# Patient Record
Sex: FEMALE | Race: WHITE | NOT HISPANIC OR LATINO | Employment: OTHER | URBAN - METROPOLITAN AREA
[De-identification: names, ages, dates, MRNs, and addresses within clinical notes are randomized per-mention and may not be internally consistent; named-entity substitution may affect disease eponyms.]

---

## 2017-02-22 ENCOUNTER — TRANSFERRED RECORDS (OUTPATIENT)
Dept: MULTI SPECIALTY CLINIC | Facility: CLINIC | Age: 57
End: 2017-02-22

## 2019-12-28 ENCOUNTER — OFFICE VISIT - HEALTHEAST (OUTPATIENT)
Dept: FAMILY MEDICINE | Facility: CLINIC | Age: 59
End: 2019-12-28

## 2019-12-28 DIAGNOSIS — J03.90 TONSILLITIS: ICD-10-CM

## 2019-12-28 LAB — DEPRECATED S PYO AG THROAT QL EIA: NORMAL

## 2019-12-28 RX ORDER — ALBUTEROL SULFATE 90 UG/1
2 AEROSOL, METERED RESPIRATORY (INHALATION)
Status: SHIPPED | COMMUNITY
Start: 2017-12-17 | End: 2022-10-06

## 2019-12-29 LAB — GROUP A STREP BY PCR: NORMAL

## 2021-05-25 ENCOUNTER — RECORDS - HEALTHEAST (OUTPATIENT)
Dept: ADMINISTRATIVE | Facility: CLINIC | Age: 61
End: 2021-05-25

## 2021-05-28 ENCOUNTER — RECORDS - HEALTHEAST (OUTPATIENT)
Dept: ADMINISTRATIVE | Facility: CLINIC | Age: 61
End: 2021-05-28

## 2021-06-04 VITALS
SYSTOLIC BLOOD PRESSURE: 146 MMHG | HEART RATE: 85 BPM | OXYGEN SATURATION: 96 % | WEIGHT: 293 LBS | RESPIRATION RATE: 16 BRPM | DIASTOLIC BLOOD PRESSURE: 82 MMHG | TEMPERATURE: 97.8 F

## 2021-06-04 NOTE — PATIENT INSTRUCTIONS - HE
Although your rapid strep test is negative, we will still treat you for strep throat based on your symptoms. We will treat with a course of antibiotics. Please complete the full course of antibiotics. You can take your medication with food and with a probiotic such as Culturelle to prevent stomach irritation. You will be contagious for 24 hours following initiation of the medication.    You may use Tylenol or Motrin for pain and fevers.    May drink warm tea, gargle saline solution, or use throat lozenges to sooth throat pain.    Change toothbrush after 48 hours of starting the antibiotic to prevent reinfection.    Watch for resolution of symptoms in the next few days. If you continue to have high fevers, begin to have difficulty swallowing or breathing, notice worsening neck pain, or difficulty moving neck, please return to clinic or present to the emergency room immediately. Otherwise, follow up with your primary care provider as needed.

## 2021-06-05 NOTE — PROGRESS NOTES
Assessment & Plan:       1. Tonsillitis  Rapid Strep A Screen-Throat swab    Group A Strep, RNA Direct Detection, Throat    penicillin VK (PEN VK) 500 MG tablet     Patient Instructions   Although your rapid strep test is negative, we will still treat you for strep throat based on your symptoms. We will treat with a course of antibiotics. Please complete the full course of antibiotics. You can take your medication with food and with a probiotic such as Culturelle to prevent stomach irritation. You will be contagious for 24 hours following initiation of the medication.    You may use Tylenol or Motrin for pain and fevers.    May drink warm tea, gargle saline solution, or use throat lozenges to sooth throat pain.    Change toothbrush after 48 hours of starting the antibiotic to prevent reinfection.    Watch for resolution of symptoms in the next few days. If you continue to have high fevers, begin to have difficulty swallowing or breathing, notice worsening neck pain, or difficulty moving neck, please return to clinic or present to the emergency room immediately. Otherwise, follow up with your primary care provider as needed.          Subjective:       Patricia Mckeon is a 59 y.o. female here for evaluation of throat pain. Onset of symptoms was 3 days ago with no improvement since then. Associated symptoms include painful swallowing and subjective fevers. She denies cough, rhinorrhea, emesis, and diarrhea.     The following portions of the patient's history were reviewed and updated as appropriate: allergies, current medications and problem list.    Review of Systems  Pertinent items are noted in HPI.     Allergies  Allergies   Allergen Reactions     Oxycodone-Acetaminophen Itching       No family history on file.    Social History     Socioeconomic History     Marital status:      Spouse name: None     Number of children: None     Years of education: None     Highest education level: None   Occupational  History     None   Social Needs     Financial resource strain: None     Food insecurity:     Worry: None     Inability: None     Transportation needs:     Medical: None     Non-medical: None   Tobacco Use     Smoking status: Never Smoker     Smokeless tobacco: Never Used   Substance and Sexual Activity     Alcohol use: None     Drug use: None     Sexual activity: None   Lifestyle     Physical activity:     Days per week: None     Minutes per session: None     Stress: None   Relationships     Social connections:     Talks on phone: None     Gets together: None     Attends Sabianism service: None     Active member of club or organization: None     Attends meetings of clubs or organizations: None     Relationship status: None     Intimate partner violence:     Fear of current or ex partner: None     Emotionally abused: None     Physically abused: None     Forced sexual activity: None   Other Topics Concern     None   Social History Narrative     None         Objective:       /82   Pulse 85   Temp 97.8  F (36.6  C) (Oral)   Resp 16   Wt (!) 296 lb 4.8 oz (134.4 kg)   SpO2 96%   Breastfeeding No   General appearance: alert, appears stated age, cooperative, no distress and non-toxic  Head: Normocephalic, without obvious abnormality, atraumatic  Ears: normal TM's and external ear canals both ears  Nose: no discharge  Throat: posterior oropharynx is erythematous, mild tonsil swelling bilaterally, no exudate; MMM, lips and tongue normal  Neck: mild anterior cervical adenopathy and supple, symmetrical, trachea midline  Lungs: clear to auscultation bilaterally  Heart: regular rate and rhythm, S1, S2 normal, no murmur, click, rub or gallop     Lab Results    Recent Results (from the past 672 hour(s))   Rapid Strep A Screen-Throat swab    Collection Time: 12/28/19  2:03 PM   Result Value Ref Range    Rapid Strep A Antigen No Group A Strep detected, presumptive negative No Group A Strep detected, presumptive negative    Group A Strep, RNA Direct Detection, Throat    Collection Time: 12/28/19  2:03 PM   Result Value Ref Range    Group A Strep by PCR No Group A Strep rRNA detected No Group A Strep rRNA detected     I personally reviewed these results and discussed findings with the patient.

## 2022-09-14 ENCOUNTER — TRANSFERRED RECORDS (OUTPATIENT)
Dept: HEALTH INFORMATION MANAGEMENT | Facility: CLINIC | Age: 62
End: 2022-09-14

## 2022-09-22 ENCOUNTER — TRANSFERRED RECORDS (OUTPATIENT)
Dept: HEALTH INFORMATION MANAGEMENT | Facility: CLINIC | Age: 62
End: 2022-09-22

## 2022-10-06 ENCOUNTER — OFFICE VISIT (OUTPATIENT)
Dept: SURGERY | Facility: CLINIC | Age: 62
End: 2022-10-06

## 2022-10-06 ENCOUNTER — TELEPHONE (OUTPATIENT)
Dept: SURGERY | Facility: CLINIC | Age: 62
End: 2022-10-06

## 2022-10-06 ENCOUNTER — LAB (OUTPATIENT)
Dept: LAB | Facility: CLINIC | Age: 62
End: 2022-10-06
Payer: COMMERCIAL

## 2022-10-06 VITALS
BODY MASS INDEX: 44.59 KG/M2 | DIASTOLIC BLOOD PRESSURE: 72 MMHG | SYSTOLIC BLOOD PRESSURE: 116 MMHG | HEIGHT: 64 IN | WEIGHT: 261.2 LBS

## 2022-10-06 DIAGNOSIS — E66.01 MORBID OBESITY (H): ICD-10-CM

## 2022-10-06 DIAGNOSIS — E78.5 DYSLIPIDEMIA: ICD-10-CM

## 2022-10-06 DIAGNOSIS — E66.01 MORBID OBESITY WITH BMI OF 40.0-44.9, ADULT (H): Primary | ICD-10-CM

## 2022-10-06 DIAGNOSIS — M16.11 PRIMARY OSTEOARTHRITIS OF RIGHT HIP: ICD-10-CM

## 2022-10-06 PROBLEM — K21.9 GASTROESOPHAGEAL REFLUX DISEASE WITHOUT ESOPHAGITIS: Status: ACTIVE | Noted: 2022-10-06

## 2022-10-06 LAB
ALBUMIN SERPL BCG-MCNC: 4.7 G/DL (ref 3.5–5.2)
ALP SERPL-CCNC: 171 U/L (ref 35–104)
ALT SERPL W P-5'-P-CCNC: 19 U/L (ref 10–35)
ANION GAP SERPL CALCULATED.3IONS-SCNC: 11 MMOL/L (ref 7–15)
AST SERPL W P-5'-P-CCNC: 25 U/L (ref 10–35)
BILIRUB SERPL-MCNC: 0.6 MG/DL
BUN SERPL-MCNC: 13.6 MG/DL (ref 8–23)
CALCIUM SERPL-MCNC: 9.7 MG/DL (ref 8.8–10.2)
CHLORIDE SERPL-SCNC: 100 MMOL/L (ref 98–107)
CREAT SERPL-MCNC: 0.69 MG/DL (ref 0.51–0.95)
DEPRECATED HCO3 PLAS-SCNC: 27 MMOL/L (ref 22–29)
ERYTHROCYTE [DISTWIDTH] IN BLOOD BY AUTOMATED COUNT: 12.3 % (ref 10–15)
FERRITIN SERPL-MCNC: 234 NG/ML (ref 11–328)
GFR SERPL CREATININE-BSD FRML MDRD: >90 ML/MIN/1.73M2
GLUCOSE SERPL-MCNC: 94 MG/DL (ref 70–99)
HBA1C MFR BLD: 5.1 % (ref 0–5.6)
HCT VFR BLD AUTO: 46.2 % (ref 35–47)
HGB BLD-MCNC: 15.3 G/DL (ref 11.7–15.7)
MCH RBC QN AUTO: 29.5 PG (ref 26.5–33)
MCHC RBC AUTO-ENTMCNC: 33.1 G/DL (ref 31.5–36.5)
MCV RBC AUTO: 89 FL (ref 78–100)
PLATELET # BLD AUTO: 320 10E3/UL (ref 150–450)
POTASSIUM SERPL-SCNC: 3.9 MMOL/L (ref 3.4–5.3)
PROT SERPL-MCNC: 8 G/DL (ref 6.4–8.3)
PTH-INTACT SERPL-MCNC: 42 PG/ML (ref 15–65)
RBC # BLD AUTO: 5.18 10E6/UL (ref 3.8–5.2)
SODIUM SERPL-SCNC: 138 MMOL/L (ref 136–145)
TSH SERPL DL<=0.005 MIU/L-ACNC: 2.16 UIU/ML (ref 0.3–4.2)
VIT B12 SERPL-MCNC: 264 PG/ML (ref 232–1245)
WBC # BLD AUTO: 6.5 10E3/UL (ref 4–11)

## 2022-10-06 PROCEDURE — 84443 ASSAY THYROID STIM HORMONE: CPT

## 2022-10-06 PROCEDURE — 82728 ASSAY OF FERRITIN: CPT

## 2022-10-06 PROCEDURE — 99204 OFFICE O/P NEW MOD 45 MIN: CPT | Performed by: FAMILY MEDICINE

## 2022-10-06 PROCEDURE — 83036 HEMOGLOBIN GLYCOSYLATED A1C: CPT

## 2022-10-06 PROCEDURE — 80053 COMPREHEN METABOLIC PANEL: CPT

## 2022-10-06 PROCEDURE — 36415 COLL VENOUS BLD VENIPUNCTURE: CPT

## 2022-10-06 PROCEDURE — 83970 ASSAY OF PARATHORMONE: CPT

## 2022-10-06 PROCEDURE — 85027 COMPLETE CBC AUTOMATED: CPT

## 2022-10-06 PROCEDURE — 82306 VITAMIN D 25 HYDROXY: CPT

## 2022-10-06 PROCEDURE — 82607 VITAMIN B-12: CPT

## 2022-10-06 RX ORDER — MULTIVIT WITH MINERALS/LUTEIN
1000 TABLET ORAL DAILY
COMMUNITY

## 2022-10-06 RX ORDER — PEN NEEDLE, DIABETIC 30 GX5/16"
1 NEEDLE, DISPOSABLE MISCELLANEOUS DAILY
Qty: 100 EACH | Refills: 3 | Status: SHIPPED | OUTPATIENT
Start: 2022-10-06 | End: 2023-01-06

## 2022-10-06 RX ORDER — PHENOL 1.4 %
600 AEROSOL, SPRAY (ML) MUCOUS MEMBRANE DAILY
Status: ON HOLD | COMMUNITY
End: 2024-08-28

## 2022-10-06 RX ORDER — PHENTERMINE HYDROCHLORIDE 37.5 MG/1
18.75-37.5 TABLET ORAL
Qty: 90 TABLET | Refills: 0 | Status: SHIPPED | OUTPATIENT
Start: 2022-10-06 | End: 2023-01-04

## 2022-10-06 NOTE — PROGRESS NOTES
"    New Medical Weight Management Consult    PATIENT:  Patricia Mckeon  MRN:         [id number]  :         1960  DARINEL:         10/6/2022    Dear [facility] Providers ,    I had the pleasure of seeing your patient, Patricia Mckeon. Full intake/assessment was done to determine barriers to weight loss success and develop a treatment plan. Patricia Mckeon is a 61 year old female interested in treatment of medical problems associated with excess weight. She has a height of 5' 4\", a weight of 261 lbs 3.2 oz, and the calculated Body mass index is 44.83 kg/m .  She was asked to lose to BMI 40 to have her right hip replaced. She has recently lost 60+ pounds with Keto and WW.  She will be traveling to [place] for her son's [employer] skilled nursing in February. It is unlikely that she will lose to her goal, have hip replacement surgery and be ready to travel by this time. Will try to lose as much weight as possible pre-travel then have right hip replaced after returning from [place].      ASSESSMENT/PLAN:  BMI goal 40 for right hip replacememt  Limited Activity d/t OA right kip, bilateral knees  Genetic predisposition  Success with Fen Phen in the past  Interested in GLP-1 RA She does not think her insurance covers them   Truliciity Suggest looking in to PAP and manufacture coupons    Phentermine  Dietitian  Consider 24 week program  GLP-1 RA          She has the following co-morbidities:       10/4/2022   I have the following health issues associated with obesity: Fatty Liver, Osteoarthritis (joint disease)   I have the following symptoms associated with obesity: Knee Pain, Hip Pain       Patient Goals 10/4/2022   I am interested in having a healthier weight to diminish current health problems: Yes   I am interested in having a healthier weight in order to prevent future health problems: Yes   I am interested in having a healthier weight in order to have a future surgery: Yes   If yes, please indicate which surgery? " "Hip and knee replacement       Referring Provider 10/4/2022   Please name the provider who referred you to Medical Weight Management.  If you do not know, please answer: \"I Don't Know\". Hetal Uribe MD       Weight History 10/4/2022   How concerned are you about your weight? Very Concerned   Would you describe your weight gain as gradual? Yes   I became overweight: As a Child   The following factors have contributed to my weight gain:  Eating Wrong Types of Food, Eating Too Much, Lack of Exercise, Genetic (Runs in the Family)   I have tried the following methods to lose weight: Weight Watchers, Atkins-type Diet (Low Carb/High Protein), Charla Fidencio, Slim Fast or Other Liquid Diets, Medications   Please list the medications you have tried.  GOLO   My lowest weight since age 16 was: 175   My highest weight since age 16 was: 335   The most weight I have ever lost was: (lbs) 100   I have the following family history of obesity/being overweight:  Many of my relatives are overweight   Has anyone in your family had weight loss surgery? No   How has your weight changed over the last year?  Lost       Diet Recall Review with Patient 10/4/2022   Do you typically eat breakfast? Yes   If you do eat breakfast, what types of food do you eat? On weekends we may go out and I have a waffle, hash browns, sausage. During the week it is ff cottage cheese and no sugar added fruit and yogurt   Do you typically eat lunch? Yes   If you do eat lunch, what types of food do you typically eat?  A weight watcher meal, healthy choice meal or a salad   Do you typically eat supper? Yes   If you do eat supper, what types of food do you typically eat? Some kind of meat, veggie, salad or ff cottage cheese   Do you typically eat snacks? Yes   If you do snack, what types of food do you typically eat? Try to eat fruit or  maybe a protein bar   Do you like vegetables?  Yes   Do you drink water? Yes   How many glasses of juice do you drink in a " typical day? 0   How many of glasses of milk do you drink in a typical day? 0   If you do drink milk, what type? N/A   How many 8oz glasses of sugar containing drinks such as Isma-Aid/sweet tea do you drink in a day? 0   How many cans/bottles of sugar pop/soda/tea/sports drinks do you drink in a day? 0   How many cans/bottles of diet pop/soda/tea or sports drink do you drink in a day? 2   How often do you have a drink of alcohol? 2-4 Times a Month   If you do drink, how many drinks might you have in a day? 3-4       Eating Habits 10/4/2022   Generally, my meals include foods like these: bread, pasta, rice, potatoes, corn, crackers, sweet dessert, pop, or juice. Less Than Weekly   Generally, my meals include foods like these: fried meats, brats, burgers, french fries, pizza, cheese, chips, or ice cream. Less Than Weekly   Eat fast food (like [employer], [employer], [employer]). Less Than Weekly   Eat at a buffet or sit-down restaurant. Less Than Weekly   Eat most of my meals in front of the TV or computer. Never   Often skip meals, eat at random times, have no regular eating times. Never   Rarely sit down for a meal but snack or graze throughout.  Never   Eat extra snacks between meals. Never   Eat most of my food at the end of the day. A Few Times a Week   Eat in the middle of the night or wake up at night to eat. Never   Eat extra snacks to prevent or correct low blood sugar. Never   Eat to prevent acid reflux or stomach pain. Never   Worry about not having enough food to eat. Never   Have you been to the food shelf at least a few times this year? No   I eat when I am depressed. Never   I eat when I am stressed. A Few Times a Week   I eat when I am bored. A Few Times a Week   I eat when I am anxious. A Few Times a Week   I eat when I am happy or as a reward. Once a Week   I feel hungry all the time even if I just have eaten. Never   Feeling full is important to me. Almost Everyday   I finish all the food on my plate  even if I am already full. Almost Everyday   I can't resist eating delicious food or walk past the good food/smell. Less Than Weekly   I eat/snack without noticing that I am eating. Almost Everyday   I eat when I am preparing the meal. A Few Times a Week   I eat more than usual when I see others eating. Never   I have trouble not eating sweets, ice cream, cookies, or chips if they are around the house. Everyday   I think about food all day. Everyday   What foods, if any, do you crave? Sweets/Candy/Chocolate   Please list any other foods you crave? Just sweets       Amount of Food 10/4/2022   I make myself vomit what I have eaten or use laxatives to get rid of food. Never   I eat a large amount of food, like a loaf of bread, a box of cookies, a pint/quart of ice cream, all at once. Never   I eat a large amount of food even when I am not hungry. Never   I eat rapidly. Everyday   I eat alone because I feel embarrassed and do not want others to see how much I have eaten. Never   I eat until I am uncomfortably full. Monthly   I feel bad, disgusted, or guilty after I overeat. Almost Everyday   I make myself vomit what I have eaten or use laxatives to get rid of food. Never       Activity/Exercise History 10/4/2022   How much of a typical 12 hour day do you spend sitting? Less Than Half the Day   How much of a typical 12 hour day do you spend lying down? Less Than Half the Day   How much of a typical day do you spend walking/standing? Less Than Half the Day   How many hours (not including work) do you spend on the TV/Video Games/Computer/Tablet/Phone? 6 Hours or More   How many times a week are you active for the purpose of exercise? Never   What keeps you from being more active? Pain   How many total minutes do you spend doing some activity for the purpose of exercising when you exercise? Less Than 15 Minutes       PAST MEDICAL HISTORY:  Past Medical History:   Diagnosis Date     Dyslipidemia      Gastroesophageal reflux  disease without esophagitis      Osteoarthritis     knees and hips needing replacement       Work/Social History Reviewed With Patient 10/4/2022   My employment status is: Full-Time   My job is:  provider   How much of your job is spent on the computer or phone? Less Than 50%   How many hours do you spend commuting to work daily?  0   What is your marital status? /In a Relationship   If in a relationship, is your significant other overweight? No   Do you have children? Yes   If you have children, are they overweight? Yes   Who do you live with?  My    Are they supportive of your health goals? Yes   Who does the food shopping?  Me       Mental Health History Reviewed With Patient 10/4/2022   Have you ever been physically or sexually abused? No   If yes, do you feel that the abuse is affecting your weight? N/A   If yes, would you like to talk to a counselor about the abuse? N/A   How often in the past 2 weeks have you felt little interest or pleasure in doing things? Not at all   Over the past 2 weeks how often have you felt down, depressed, or hopeless? Not at all       Sleep History Reviewed With Patient 10/4/2022   How many hours do you sleep at night? 7   Do you think that you snore loudly or has anybody ever heard you snore loudly (louder than talking or so loud it can be heard behind a shut door)? Yes   Has anyone seen or heard you stop breathing during your sleep? No   Do you often feel tired, fatigued, or sleepy during the day? No   Do you have a TV/Computer in your bedroom? Yes       MEDICATIONS:   Current Outpatient Medications   Medication Sig Dispense Refill     Aspirin 81 MG CAPS        calcium carbonate (OS-RAJANI) 600 MG tablet Take by mouth 2 times daily (with meals)       cane Liz [CANE LIZ] Wide Base Quad Cane for home use. For 12 weeks.       cholecalciferol (VITAMIN D3) 125 mcg (5000 units) capsule Take by mouth daily       dulaglutide (TRULICITY) 0.75 MG/0.5ML pen Inject 0.75  "mg Subcutaneous every 7 days for 28 days 2 mL 0     dulaglutide (TRULICITY) 1.5 MG/0.5ML pen Inject 1.5 mg Subcutaneous every 7 days 6 mL 3     Garlic 1000 MG CAPS        Omega-3 Fatty Acids (FISH OIL) 1360 MG CAPS        omeprazole (PRILOSEC) 20 MG capsule [OMEPRAZOLE (PRILOSEC) 20 MG CAPSULE] Take 20 mg by mouth.       phentermine (ADIPEX-P) 37.5 MG tablet Take 0.5-1 tablets (18.75-37.5 mg) by mouth every morning (before breakfast) for 90 days 90 tablet 0     UNABLE TO FIND MEDICATION NAME: Move Free Triple Action - joints       vitamin C (ASCORBIC ACID) 1000 MG TABS Take 1,000 mg by mouth daily         ALLERGIES:   Allergies   Allergen Reactions     Oxycodone-Acetaminophen Itching       PHYSICAL EXAM:  /72   Ht 1.626 m (5' 4\")   Wt 118.5 kg (261 lb 3.2 oz)   BMI 44.83 kg/m    Cane assisted ambulation  Pleasant and in no distress  Mallampati 1+ Neck 16\"  Heart regular  Lungs clear  Abdomen large pannus, 53\" circumference  Extremities tr BLE  Alert and oriented  Euthymic    FOLLOW-UP:   As above.    TIME: 45 min spent on evaluation, management, counseling, education, & motivational interviewing and documentation  Sincerely,    Hetal Uribe MD      "

## 2022-10-06 NOTE — PATIENT INSTRUCTIONS
[facility] Basics    Remember to: check out the 24 week program  [email or web address]/weight-loss-24-week     -Eat 3 meals a day (not 2, not 5) Chew your food well/SLOW down  -Eat your protein first  -Be a water drinker/Minize liquid calories (no regular pop, no juice) skim or 1% milk OK  -Sleep 7-8 hours each night. Address sleep if problematic  -Stress management is important. Address if problematic  -Move-8000 steps daily Muscle: maintain your muscle mass (strength training 2X/wk)  -Wheat, not white (bread, pasta, crackers, rickie, bagels, tortillas, rice)  -Limit restaurant, cafeteria, take out, drive through to 2 times per week or less  -Minimize caffeine, alcohol, and night-time snacking  -Consider keeping a food diary (i.e. My Fitness Pal, Lose It, or other food tracker)  -Follow up with the dietitian      **Some lean proteins: chicken, turkey, tuna, salmon, crab, fish, shrimp, scallops, lobster, lean cuts of beef and pork, luncheon meats, veggie burgers, beans (black, lima, garbanzo, griggs, kidney, refried), chile, cottage cheese, string cheese, other cheese, eggs, tofu, peanut butter, nuts, vegan crumbles, greek yogurt       MEDICATIONS FOR WEIGHT LOSS    PHENTERMINE (Adipex): approved in 1959 for appetite suppression.  It has stimulant effects and cannot be used with Ritalin, Concerta, or other stimulants.  It is not addictive although it's chemically related to amphetamines.  Amphetamines are addictive. The most common side effects are dry mouth, increased energy and concentration, increased pulse, and constipation.  You should not take phentermine if you have glaucoma, hyperthyroidism, or uncontrolled/untreated hypertension.  $24-$30 for 90 tablets    PHENDIMETRAZINE (Bontril): Appetite suppressant/sympathomimetic.  Controlled substance.  Side effects and contraindications similar to phentermine.  $45-$60 for 3 month supply    TOPIRAMATE (Topamax): Anti-seizure medication, also used  to prevent migraines.  Side effects include paresthesia, glaucoma, altered concentration, attention difficulties, memory and speech problems, metabolic acidosis, depression, increase in body temperature and decrease sweating, kidney stones, and weight loss.  Do not take Topamax while taking Depakote as this can cause high ammonia levels.  You must have reliable birth control as Topamax can cause birth defects.  Discontinue slowly to avoid seizure.  Insurance usually covers Topiramate.    QSYMIA (Phentermine + Topamax):  See above information about phentermine and Topamax.  Most common side effects are paresthesia, dizziness, distortion of taste, insomnia, constipation, and dry mouth.  $150-$220 per month    DIETHYLPROPION (Tenuate): Sympathomimetic amine.  Appetite suppressant.  Doses 25 mg before meals or 75 mg per day.  Most common side effects are hypertension, palpitations, EKG changes, and increased seizures in epileptics.  There can be a possible adverse reaction with alcohol.  $70-$90 per 3 months    XENICAL(Orlistat) (Silvio OTC): Approved in 1999.  A fat-blocker.  It reduces absorption of fat by approximately 30%.  It has beneficial effects on lipid levels.  Side effects include diarrhea, abdominal cramping, fecal incontinence, oily spotting, and flatus with discharge.  Side effects are minimized if the patient limits their dietary fat to no more than 30% of their diet.  Patients must take a multivitamin daily to avoid vitamin D, E, A, and K deficiency.  $120 per month    CONTRAVE (Naltrexone/Bupropion): Approved in 2014.  It is a combination pill including an opioid receptor blocker and a long-standing antidepressant.  Most common side effects include nausea, constipation, headache, vomiting, dizziness, trouble sleeping, dry mouth, and diarrhea.  With all antidepressants watch for mood changes and suicide ideation.  Bupropion has been known to lower the seizure threshold in those prone to seizures.  It  should not be used in a patient with a recent history of bulimia. It has been associated with liver damage from taking higher than recommended doses.  Do not use countrave if you have taken opioid medications or opioid street drugs in the past 7-10 days, if you are currently on opioids, methadone, or if you are pregnant.  Do not use contrave if you have recently stopped using alcohol or benzodiazepines.  Taper off contrave slowly.  Dosing: titrate up to 2 tablets twice daily of the Naltrexone 8 mg/ Bupropion 90 mg tablets.  $200 for 90 tablets    SAXENDA (Liraglutide): A daily injectable (3mg daily) medication used for type 2 diabetes (Victoza). Glucagon-like peptide-1 (GLP-1) agonist. Contraindications include personal or family history of medullary thyroid cancer or MEN type 2. Acute pancreatitis has been observed in patients taking liraglutide. Liraglutide causes C-cell tumors in rats and mice. It is unknown whether liraglutide causes tumors in humans. Start at 0.6mg, increasing the dose weekly up to 3mg.     TRULICITY (Dulaglutide): A weekly injectable (4.5mg weekly) medication used for type 2 diabetes. Glucagon-like peptide-1(GLP-1) agonist. Contraindications include personal or family history of medullary thyroid cancer or MEN type 2. Acute pancreatitis has been observed in patients taking liraglutide. Dulaglutide causes C-cell tumors in rats and mice. It is unknown whether liraglutide causes tumors in humans. Start at 0.75 mg, 1.5 mg, 3.0 mg, to 4.5 mg increasing the dose monthly.      VYVANSE (Lisdexamfetamine dimesylate): a CNS stimulant used to treat ADHD. Indicated for the treatment of moderate to severe Binge Eating Disorder in Adults. Contraindicated in patients with known heart disease, structural abnormalities of the heart, serious heart arrhythmias or unexplained syncope. CNS stimulants such as vyvanse may cause manic or psychotic symptoms in patients with BPAD or pre-existing psychosis. Use with  caution in patients with Raynaud's phenomenon. Most common side effects include dry mouth, insomnia, decreased appetite, increased heart rate, jittery feeling, constipation and anxiety.     WEGOVY (Semaglutide): A weekly injectable (2.4mg weekly) medication used for type 2 diabetes (Ozempic). Glucagon-like peptide-1 (GLP-1) agonist. Contraindications include personal or family history of medullary thyroid cancer or MEN type 2. Acute pancreatitis has been observed in patients taking Semaglutide. Semaglutide causes C-cell tumors in rats and mice. It is unknown whether Semaglutide causes tumors in humans. Start at 0.25mg, increasing to 0.5, 1.0, 1.7 then 2.4 monthly.     MOUNJARO (Tirzepatide): A weekly injectable medication indicated for type 2 diabetes. Glucagon-like-peptide-1 (GLP-1) agonist and Glucose-Dependent Insulinotropic Polypeptide (GIP) agonist. Contraindications include personal or family history of medullary thyroid cancer or MEN type 2. Acute pancreatitis has been observed in patients taking Tirzepatide. Tirzepatide causes C-cell tumors in rats and mice. It is unknown whether Tirzepatide causes tumors in humans. Watch for neck mass, difficulty swallowing, persistent hoarseness, and epigastric abdominal pain. Most common side effects include nausea, diarrhea, vomiting, constipation, dyspepsia, and abdominal pain. Start at 2.5mg, increasing to 5.0, 7.5, 10, 12.5 then 15mg monthly.        [email or web address]     Semaglutide (Wegovy) (Ozempic)-SHORTAGE  Liraglutide (Saxenda) (Victoza)--DAILY  Exenatide (Bydureon)  Dulaglutide (Trulicity)    Tirzepatide (Mounjaro)

## 2022-10-06 NOTE — LETTER
"    10/6/2022         RE: Patricia Mckeon  [address] [id number]        Dear Colleague,    Thank you for referring your patient, Patricia Mckeon, to the [facility]ATRICS [facility] [place]. Please see a copy of my visit note below.        New Medical Weight Management Consult    PATIENT:  Patricia Mckeon  MRN:         [id number]  :         1960  DARINEL:         10/6/2022    Dear [facility] Providers ,    I had the pleasure of seeing your patient, Patricia Mckeon. Full intake/assessment was done to determine barriers to weight loss success and develop a treatment plan. Patricia Mckeon is a 61 year old female interested in treatment of medical problems associated with excess weight. She has a height of 5' 4\", a weight of 261 lbs 3.2 oz, and the calculated Body mass index is 44.83 kg/m .  She was asked to lose to BMI 40 to have her right hip replaced. She has recently lost 60+ pounds with Keto and WW.  She will be traveling to [place] for her son's [employer] nursing [facility] in February. It is unlikely that she will lose to her goal, have hip replacement surgery and be ready to travel by this time. Will try to lose as much weight as possible pre-travel then have right hip replaced after returning from [place].      ASSESSMENT/PLAN:  BMI goal 40 for right hip replacememt  Limited Activity d/t OA right kip, bilateral knees  Genetic predisposition  Success with Fen Phen in the past  Interested in GLP-1 RA She does not think her insurance covers them   Tremilyiiashvin Suggest looking in to PAP and manufacture coupons    Phentermine  Dietitian  Consider 24 week program  GLP-1 RA          She has the following co-morbidities:       10/4/2022   I have the following health issues associated with obesity: Fatty Liver, Osteoarthritis (joint disease)   I have the following symptoms associated with obesity: Knee Pain, Hip Pain       Patient Goals 10/4/2022   I am interested in " "having a healthier weight to diminish current health problems: Yes   I am interested in having a healthier weight in order to prevent future health problems: Yes   I am interested in having a healthier weight in order to have a future surgery: Yes   If yes, please indicate which surgery? Hip and knee replacement       Referring Provider 10/4/2022   Please name the provider who referred you to Medical Weight Management.  If you do not know, please answer: \"I Don't Know\". Hetal Uribe MD       Weight History 10/4/2022   How concerned are you about your weight? Very Concerned   Would you describe your weight gain as gradual? Yes   I became overweight: As a Child   The following factors have contributed to my weight gain:  Eating Wrong Types of Food, Eating Too Much, Lack of Exercise, Genetic (Runs in the Family)   I have tried the following methods to lose weight: Weight Watchers, Atkins-type Diet (Low Carb/High Protein), Charla Fidencio, Slim Fast or Other Liquid Diets, Medications   Please list the medications you have tried.  GOLO   My lowest weight since age 16 was: 175   My highest weight since age 16 was: 335   The most weight I have ever lost was: (lbs) 100   I have the following family history of obesity/being overweight:  Many of my relatives are overweight   Has anyone in your family had weight loss surgery? No   How has your weight changed over the last year?  Lost       Diet Recall Review with Patient 10/4/2022   Do you typically eat breakfast? Yes   If you do eat breakfast, what types of food do you eat? On weekends we may go out and I have a waffle, hash browns, sausage. During the week it is ff cottage cheese and no sugar added fruit and yogurt   Do you typically eat lunch? Yes   If you do eat lunch, what types of food do you typically eat?  A weight watcher meal, healthy choice meal or a salad   Do you typically eat supper? Yes   If you do eat supper, what types of food do you typically eat? " Some kind of meat, veggie, salad or ff cottage cheese   Do you typically eat snacks? Yes   If you do snack, what types of food do you typically eat? Try to eat fruit or  maybe a protein bar   Do you like vegetables?  Yes   Do you drink water? Yes   How many glasses of juice do you drink in a typical day? 0   How many of glasses of milk do you drink in a typical day? 0   If you do drink milk, what type? N/A   How many 8oz glasses of sugar containing drinks such as Isma-Aid/sweet tea do you drink in a day? 0   How many cans/bottles of sugar pop/soda/tea/sports drinks do you drink in a day? 0   How many cans/bottles of diet pop/soda/tea or sports drink do you drink in a day? 2   How often do you have a drink of alcohol? 2-4 Times a Month   If you do drink, how many drinks might you have in a day? 3-4       Eating Habits 10/4/2022   Generally, my meals include foods like these: bread, pasta, rice, potatoes, corn, crackers, sweet dessert, pop, or juice. Less Than Weekly   Generally, my meals include foods like these: fried meats, brats, burgers, french fries, pizza, cheese, chips, or ice cream. Less Than Weekly   Eat fast food (like [employer], [employer], [employer]aco Bell). Less Than Weekly   Eat at a buffet or sit-down restaurant. Less Than Weekly   Eat most of my meals in front of the TV or computer. Never   Often skip meals, eat at random times, have no regular eating times. Never   Rarely sit down for a meal but snack or graze throughout.  Never   Eat extra snacks between meals. Never   Eat most of my food at the end of the day. A Few Times a Week   Eat in the middle of the night or wake up at night to eat. Never   Eat extra snacks to prevent or correct low blood sugar. Never   Eat to prevent acid reflux or stomach pain. Never   Worry about not having enough food to eat. Never   Have you been to the food shelf at least a few times this year? No   I eat when I am depressed. Never   I eat when I am stressed. A Few Times a  Week   I eat when I am bored. A Few Times a Week   I eat when I am anxious. A Few Times a Week   I eat when I am happy or as a reward. Once a Week   I feel hungry all the time even if I just have eaten. Never   Feeling full is important to me. Almost Everyday   I finish all the food on my plate even if I am already full. Almost Everyday   I can't resist eating delicious food or walk past the good food/smell. Less Than Weekly   I eat/snack without noticing that I am eating. Almost Everyday   I eat when I am preparing the meal. A Few Times a Week   I eat more than usual when I see others eating. Never   I have trouble not eating sweets, ice cream, cookies, or chips if they are around the house. Everyday   I think about food all day. Everyday   What foods, if any, do you crave? Sweets/Candy/Chocolate   Please list any other foods you crave? Just sweets       Amount of Food 10/4/2022   I make myself vomit what I have eaten or use laxatives to get rid of food. Never   I eat a large amount of food, like a loaf of bread, a box of cookies, a pint/quart of ice cream, all at once. Never   I eat a large amount of food even when I am not hungry. Never   I eat rapidly. Everyday   I eat alone because I feel embarrassed and do not want others to see how much I have eaten. Never   I eat until I am uncomfortably full. Monthly   I feel bad, disgusted, or guilty after I overeat. Almost Everyday   I make myself vomit what I have eaten or use laxatives to get rid of food. Never       Activity/Exercise History 10/4/2022   How much of a typical 12 hour day do you spend sitting? Less Than Half the Day   How much of a typical 12 hour day do you spend lying down? Less Than Half the Day   How much of a typical day do you spend walking/standing? Less Than Half the Day   How many hours (not including work) do you spend on the TV/Video Games/Computer/Tablet/Phone? 6 Hours or More   How many times a week are you active for the purpose of  exercise? Never   What keeps you from being more active? Pain   How many total minutes do you spend doing some activity for the purpose of exercising when you exercise? Less Than 15 Minutes       PAST MEDICAL HISTORY:  Past Medical History:   Diagnosis Date     Dyslipidemia      Gastroesophageal reflux disease without esophagitis      Osteoarthritis     knees and hips needing replacement       Work/Social History Reviewed With Patient 10/4/2022   My employment status is: Full-Time   My job is:  provider   How much of your job is spent on the computer or phone? Less Than 50%   How many hours do you spend commuting to work daily?  0   What is your marital status? /In a Relationship   If in a relationship, is your significant other overweight? No   Do you have children? Yes   If you have children, are they overweight? Yes   Who do you live with?  My    Are they supportive of your health goals? Yes   Who does the food shopping?  Me       Mental Health History Reviewed With Patient 10/4/2022   Have you ever been physically or sexually abused? No   If yes, do you feel that the abuse is affecting your weight? N/A   If yes, would you like to talk to a counselor about the abuse? N/A   How often in the past 2 weeks have you felt little interest or pleasure in doing things? Not at all   Over the past 2 weeks how often have you felt down, depressed, or hopeless? Not at all       Sleep History Reviewed With Patient 10/4/2022   How many hours do you sleep at night? 7   Do you think that you snore loudly or has anybody ever heard you snore loudly (louder than talking or so loud it can be heard behind a shut door)? Yes   Has anyone seen or heard you stop breathing during your sleep? No   Do you often feel tired, fatigued, or sleepy during the day? No   Do you have a TV/Computer in your bedroom? Yes       MEDICATIONS:   Current Outpatient Medications   Medication Sig Dispense Refill     Aspirin 81 MG CAPS         "calcium carbonate (OS-RAJANI) 600 MG tablet Take by mouth 2 times daily (with meals)       cane Liz [CANE LIZ] Wide Base Quad Cane for home use. For 12 weeks.       cholecalciferol (VITAMIN D3) 125 mcg (5000 units) capsule Take by mouth daily       dulaglutide (TRULICITY) 0.75 MG/0.5ML pen Inject 0.75 mg Subcutaneous every 7 days for 28 days 2 mL 0     dulaglutide (TRULICITY) 1.5 MG/0.5ML pen Inject 1.5 mg Subcutaneous every 7 days 6 mL 3     Garlic 1000 MG CAPS        Omega-3 Fatty Acids (FISH OIL) 1360 MG CAPS        omeprazole (PRILOSEC) 20 MG capsule [OMEPRAZOLE (PRILOSEC) 20 MG CAPSULE] Take 20 mg by mouth.       phentermine (ADIPEX-P) 37.5 MG tablet Take 0.5-1 tablets (18.75-37.5 mg) by mouth every morning (before breakfast) for 90 days 90 tablet 0     UNABLE TO FIND MEDICATION NAME: Move Free Triple Action - joints       vitamin C (ASCORBIC ACID) 1000 MG TABS Take 1,000 mg by mouth daily         ALLERGIES:   Allergies   Allergen Reactions     Oxycodone-Acetaminophen Itching       PHYSICAL EXAM:  /72   Ht 1.626 m (5' 4\")   Wt 118.5 kg (261 lb 3.2 oz)   BMI 44.83 kg/m    Cane assisted ambulation  Pleasant and in no distress  Mallampati 1+ Neck 16\"  Heart regular  Lungs clear  Abdomen large pannus, 53\" circumference  Extremities tr BLE  Alert and oriented  Euthymic    FOLLOW-UP:   As above.    TIME: 45 min spent on evaluation, management, counseling, education, & motivational interviewing and documentation  Sincerely,    Hetal Uribe MD          Again, thank you for allowing me to participate in the care of your patient.        Sincerely,        Hetal Uribe MD    "

## 2022-10-07 ENCOUNTER — TELEPHONE (OUTPATIENT)
Dept: SURGERY | Facility: CLINIC | Age: 62
End: 2022-10-07

## 2022-10-07 LAB — DEPRECATED CALCIDIOL+CALCIFEROL SERPL-MC: 48 UG/L (ref 20–75)

## 2022-10-13 DIAGNOSIS — E53.8 LOW SERUM VITAMIN B12: Primary | ICD-10-CM

## 2022-10-14 ENCOUNTER — TELEPHONE (OUTPATIENT)
Dept: SURGERY | Facility: CLINIC | Age: 62
End: 2022-10-14

## 2022-10-14 NOTE — TELEPHONE ENCOUNTER
Prior Authorization Retail Medication Request    Medication/Dose: Saxenda 18mg/3ml Inj    Key: [id number]    Pharmacy Information (if different than what is on RX)  Name:  [facility]  Phone:  [phone]

## 2022-10-14 NOTE — TELEPHONE ENCOUNTER
Central Prior Authorization Team   Phone: [phone]    PA Initiation    Medication: Saxenda 18mg/3ml Inj  Insurance Company: [employer] - Phone [phone] Fax [phone]  Pharmacy Filling the Rx: [id number] [[place]][address]  Filling Pharmacy Phone: [phone]  Filling Pharmacy Fax:    Start Date: 10/14/2022  Manually faxed request

## 2022-11-04 ENCOUNTER — VIRTUAL VISIT (OUTPATIENT)
Dept: SURGERY | Facility: CLINIC | Age: 62
End: 2022-11-04
Payer: COMMERCIAL

## 2022-11-04 DIAGNOSIS — E66.01 CLASS 3 SEVERE OBESITY DUE TO EXCESS CALORIES WITH SERIOUS COMORBIDITY AND BODY MASS INDEX (BMI) OF 40.0 TO 44.9 IN ADULT (H): ICD-10-CM

## 2022-11-04 DIAGNOSIS — Z71.3 NUTRITIONAL COUNSELING: ICD-10-CM

## 2022-11-04 DIAGNOSIS — E66.813 CLASS 3 SEVERE OBESITY DUE TO EXCESS CALORIES WITH SERIOUS COMORBIDITY AND BODY MASS INDEX (BMI) OF 40.0 TO 44.9 IN ADULT (H): ICD-10-CM

## 2022-11-04 DIAGNOSIS — E78.5 DYSLIPIDEMIA: Primary | ICD-10-CM

## 2022-11-04 PROCEDURE — 97802 MEDICAL NUTRITION INDIV IN: CPT | Mod: 95 | Performed by: DIETITIAN, REGISTERED

## 2022-11-04 NOTE — PROGRESS NOTES
Patricia Mckeon is a 62 year old who is being evaluated via a billable video visit.      How would you like to obtain your AVS? MyChart  If the video visit is dropped, the invitation should be resent by: Send to e-mail at: [email or web address]  Will anyone else be joining your video visit? No          Medical Weight Loss Initial Diet Evaluation  Assessment:  Patricia is presenting today for a new weight management nutrition consultation. Pt has had an initial appointment with Dr. Uribe.  Weight loss medication: Phentermine.       Anthropometrics:    Pt's weight is 256 lbs   Initial weight: 261.2 lbs  Weight change: 5.2 lbs (down)   BMI: There is no height or weight on file to calculate BMI.   Ideal body weight: 54.7 kg (120 lb 9.5 oz)  Adjusted ideal body weight: 80.2 kg (176 lb 13.4 oz)    Estimated RMR ([place]-St Jeor equation):  1701 kcals x 1.3 (light active) = 2211 kcals (for weight maintenance)    Recommended Protein Intake: 60-80 grams of protein/day    Medical History:  Patient Active Problem List   Diagnosis     Gastroesophageal reflux disease without esophagitis     Dyslipidemia     Morbid obesity (H)      Diabetes: No  HbA1c:  No results found for: HGBA1C    Nutrition History:   Food allergies/intolerances/cultural or religous food customs: No     Vitamins/Mineral Supplementation: Vitamin D3, Omega 3, Calcium, Vitamin C, Garlic, Vitamin B12    Dietary Recall:(using bigtincan corbin, 24 points/day)  Breakfast: cottage cheese, berries and peaches, fit and light yogurt   Lunch: 45 calorie bread, turkey, guacamole with fruit and salad   Dinner: taco salad with lean ground meat or ground turkey  Typical Snacks: cauliflower strips or WW chips     Eating out: 1 time/week or less    Beverages: water-average around 64 oz + (trying to be more mindful of, starting this week), Sparkling Water, coffee     Exercise: no set regimen-has a , so stays active with the kids    Nutrition Diagnosis (PES statement):    Overweight/Obesity (NC 3.3) related to overeating and poor lifestyle habits as evidenced by patient report of inability to lose weight and BMI of 44 kg/m2.     Nutrition Intervention  1. Food and/or Nutrient Delivery   a. Placed emphasis on importance of developing a healthy meal routine, aiming for 3 meals a day and no snacks.    2. Nutrition Education   a. Discussed with patient how to build a meal: the importance of including a lean/low fat protein at each meal, include a source of vegetables at a minimum of lunch and dinner and limiting carbohydrate intake to <25% per meal.  b. Educated on sources of lean protein, portion sizes, the amount of grams found in each source. Recommend patient to aim for 20-30g protein at each meal.  c. Educated on how to read a food label: keeping total fat <10g and sugar <10g per serving.  d. Discussed the importance of adequate hydration, with emphasis on drinking 64oz of water or zero calorie beverages per day.  3. Nutrition Counseling   a. Encouraged importance of developing routine exercise for health benefits and weight loss.  Goals established by patient:   1. Continues to focus on protein first at each meal, aiming for 60-80 grams of protein/day.   2. Continue to use WW system to help with portion control.   3. Work on establishing an exercise routine as tolerated.   Handouts provided:  Exercise Video Options    Assessment/Plan:    Pt will follow up in 2 month(s) with bariatrician and 1 month(s) with dietitian.       Video-Visit Details    Type of service:  Video Visit    Video Start Time (time video started): 9:14 am    Video End Time (time video stopped): 9:29 am    Originating Location (pt. Location): Home        Distant Location (provider location):  Off-site    Mode of Communication:  Video Conference via [facility]    Physician has received verbal consent for a Video Visit from the patient? Yes      Pati Amador, JUSTIN

## 2022-11-04 NOTE — LETTER
11/4/2022         RE: Patricia Mckeon  [address] [address]        Dear Colleague,    Thank you for referring your patient, Patricia Mckeon, to the [facility] BARIATRICS CARE [place]. Please see a copy of my visit note below.    Patricia Mckeon is a 62 year old who is being evaluated via a billable video visit.      How would you like to obtain your AVS? MyChart  If the video visit is dropped, the invitation should be resent by: Send to e-mail at: [email or web address]  Will anyone else be joining your video visit? No          Medical Weight Loss Initial Diet Evaluation  Assessment:  Patricia is presenting today for a new weight management nutrition consultation. Pt has had an initial appointment with Dr. Uribe.  Weight loss medication: Phentermine.       Anthropometrics:    Pt's weight is 256 lbs   Initial weight: 261.2 lbs  Weight change: 5.2 lbs (down)   BMI: There is no height or weight on file to calculate BMI.   Ideal body weight: 54.7 kg (120 lb 9.5 oz)  Adjusted ideal body weight: 80.2 kg (176 lb 13.4 oz)    Estimated RMR ([place]-St Jeor equation):  1701 kcals x 1.3 (light active) = 2211 kcals (for weight maintenance)    Recommended Protein Intake: 60-80 grams of protein/day    Medical History:  Patient Active Problem List   Diagnosis     Gastroesophageal reflux disease without esophagitis     Dyslipidemia     Morbid obesity (H)      Diabetes: No  HbA1c:  No results found for: HGBA1C    Nutrition History:   Food allergies/intolerances/cultural or religous food customs: No     Vitamins/Mineral Supplementation: Vitamin D3, Omega 3, Calcium, Vitamin C, Garlic, Vitamin B12    Dietary Recall:(using Zmanda corbin, 24 points/day)  Breakfast: cottage cheese, berries and peaches, fit and light yogurt   Lunch: 45 calorie bread, turkey, guacamole with fruit and salad   Dinner: taco salad with lean ground meat or ground turkey  Typical Snacks: cauliflower strips or WW chips      Eating out: 1 time/week or less    Beverages: water-average around 64 oz + (trying to be more mindful of, starting this week), Sparkling Water, coffee     Exercise: no set regimen-has a , so stays active with the kids    Nutrition Diagnosis (PES statement):   Overweight/Obesity (NC 3.3) related to overeating and poor lifestyle habits as evidenced by patient report of inability to lose weight and BMI of 44 kg/m2.     Nutrition Intervention  1. Food and/or Nutrient Delivery   a. Placed emphasis on importance of developing a healthy meal routine, aiming for 3 meals a day and no snacks.    2. Nutrition Education   a. Discussed with patient how to build a meal: the importance of including a lean/low fat protein at each meal, include a source of vegetables at a minimum of lunch and dinner and limiting carbohydrate intake to <25% per meal.  b. Educated on sources of lean protein, portion sizes, the amount of grams found in each source. Recommend patient to aim for 20-30g protein at each meal.  c. Educated on how to read a food label: keeping total fat <10g and sugar <10g per serving.  d. Discussed the importance of adequate hydration, with emphasis on drinking 64oz of water or zero calorie beverages per day.  3. Nutrition Counseling   a. Encouraged importance of developing routine exercise for health benefits and weight loss.  Goals established by patient:   1. Continues to focus on protein first at each meal, aiming for 60-80 grams of protein/day.   2. Continue to use WW system to help with portion control.   3. Work on establishing an exercise routine as tolerated.   Handouts provided:  Exercise Video Options    Assessment/Plan:    Pt will follow up in 2 month(s) with bariatrician and 1 month(s) with dietitian.       Video-Visit Details    Type of service:  Video Visit    Video Start Time (time video started): 9:14 am    Video End Time (time video stopped): 9:29 am    Originating Location (pt. Location):  Home        Distant Location (provider location):  Off-site    Mode of Communication:  Video Conference via [facility]    Physician has received verbal consent for a Video Visit from the patient? Yes      Pati Amador RD          Again, thank you for allowing me to participate in the care of your patient.        Sincerely,        Pati Amador RD

## 2022-12-09 ENCOUNTER — VIRTUAL VISIT (OUTPATIENT)
Dept: SURGERY | Facility: CLINIC | Age: 62
End: 2022-12-09
Payer: COMMERCIAL

## 2022-12-09 DIAGNOSIS — E66.813 CLASS 3 SEVERE OBESITY DUE TO EXCESS CALORIES WITH SERIOUS COMORBIDITY AND BODY MASS INDEX (BMI) OF 40.0 TO 44.9 IN ADULT (H): Primary | ICD-10-CM

## 2022-12-09 DIAGNOSIS — Z71.3 NUTRITIONAL COUNSELING: ICD-10-CM

## 2022-12-09 DIAGNOSIS — E78.5 DYSLIPIDEMIA: ICD-10-CM

## 2022-12-09 DIAGNOSIS — E66.01 CLASS 3 SEVERE OBESITY DUE TO EXCESS CALORIES WITH SERIOUS COMORBIDITY AND BODY MASS INDEX (BMI) OF 40.0 TO 44.9 IN ADULT (H): Primary | ICD-10-CM

## 2022-12-09 PROCEDURE — 97803 MED NUTRITION INDIV SUBSEQ: CPT | Mod: 95 | Performed by: DIETITIAN, REGISTERED

## 2022-12-09 NOTE — PROGRESS NOTES
Patricia Mckeon is a 62 year old who is being evaluated via a billable video visit.      How would you like to obtain your AVS? MyChart  If the video visit is dropped, the invitation should be resent by: Send to e-mail at: [email or web address]  Will anyone else be joining your video visit? No        Medical  Weight Loss Follow-Up Diet Evaluation  Assessment:  Patricia is presenting today for a follow up weight management nutrition consultation. Pt has had an initial appointment with Dr. Uribe.  Weight loss medication: Phentermine.   Pt's weight is 250 lbs   Initial weight: 261.2 lbs  Weight change: 11.2 lbs (down)    No flowsheet data found.  BMI: There is no height or weight on file to calculate BMI.  Ideal body weight: 54.7 kg (120 lb 9.5 oz)  Adjusted ideal body weight: 80.2 kg (176 lb 13.4 oz)    Estimated RMR ([place]-St Jeor equation):   1674 kcals x 1.3 (light active) = 2176 kcals (for weight maintenance)     Recommended Protein Intake: 60-80 grams of protein/day  Patient Active Problem List:  Patient Active Problem List   Diagnosis     Gastroesophageal reflux disease without esophagitis     Dyslipidemia     Morbid obesity (H)     Diabetes: No    Progress on goals from last visit: Has been working on protein first at meals, noting that it is challenging during the holidays, but is trying to work her way through it.  Continues to utilize the eBIZ.mobility point system for portion control.  Patient reports that she feels that things are going well at this point and does not have any specific questions at this time.     Dietary Recall:  Breakfast: cottage cheese and fruit (blueberries, raspberries), low fat yogurt   Lunch:salad with hard boiled eggs or ham, apple or orange or banana  Dinner:tacos with ground turkey, low carb tortilla, sour cream or pork chops or fish or shrimp, broccoli or green beans  Typical snacks: at night-fudge-sicle or WW bar   Beverages: water, Sparkling Ice  Exercise: runs a -stays busy with  the kids, small amount weights     Nutrition Diagnosis:    Overweight/Obesity (NC 3.3) related to overeating and poor lifestyle habits as evidenced by patient's subjective statements (inability to lose weight) and BMI of 42.98 kg/m2.     Intervention:  1. Food and/or nutrient delivery: balanced meals, adequate protein   2. Nutrition education: none  3. Nutrition counseling: provided support and encouragement     Monitoring/Evaluation:    Goals:  1. Focus on protein first at each meal, aiming for 60-80 grams of protein/day.   2. Work on establishing an exercise regimen as able/tolerated.     Patient to follow up in 1 month(s) with bariatrician and prn with JUSTIN      Video-Visit Details    Type of service:  Video Visit    Video Start Time (time video started): 12:51 pm    Video End Time (time video stopped): 1:02 pm    Originating Location (pt. Location): Home        Distant Location (provider location):  Off-site    Mode of Communication:  Video Conference via [facility]    Physician has received verbal consent for a Video Visit from the patient? Yes      Pati Amador, JUSTIN

## 2022-12-09 NOTE — LETTER
12/9/2022         RE: Patricia Mckeon  [address] [id number]        Dear Colleague,    Thank you for referring your patient, Patricia Mckeon, to the [facility] SURGERY [facility] AND BARIATRICS CARE [place]. Please see a copy of my visit note below.    Patricia Mckeon is a 62 year old who is being evaluated via a billable video visit.      How would you like to obtain your AVS? MyChart  If the video visit is dropped, the invitation should be resent by: Send to e-mail at: [email or web address]  Will anyone else be joining your video visit? No        Medical  Weight Loss Follow-Up Diet Evaluation  Assessment:  Patricia is presenting today for a follow up weight management nutrition consultation. Pt has had an initial appointment with Dr. Uribe.  Weight loss medication: Phentermine.   Pt's weight is 250 lbs   Initial weight: 261.2 lbs  Weight change: 11.2 lbs (down)    No flowsheet data found.  BMI: There is no height or weight on file to calculate BMI.  Ideal body weight: 54.7 kg (120 lb 9.5 oz)  Adjusted ideal body weight: 80.2 kg (176 lb 13.4 oz)    Estimated RMR ([place]-St Jeor equation):   1674 kcals x 1.3 (light active) = 2176 kcals (for weight maintenance)     Recommended Protein Intake: 60-80 grams of protein/day  Patient Active Problem List:  Patient Active Problem List   Diagnosis     Gastroesophageal reflux disease without esophagitis     Dyslipidemia     Morbid obesity (H)     Diabetes: No    Progress on goals from last visit: Has been working on protein first at meals, noting that it is challenging during the holidays, but is trying to work her way through it.  Continues to utilize the [employer] point system for portion control.  Patient reports that she feels that things are going well at this point and does not have any specific questions at this time.     Dietary Recall:  Breakfast: cottage cheese and fruit (blueberries, raspberries), low fat yogurt   Lunch:salad with hard boiled  eggs or ham, apple or orange or banana  Dinner:tacos with ground turkey, low carb tortilla, sour cream or pork chops or fish or shrimp, broccoli or green beans  Typical snacks: at night-fudge-sicle or WW bar   Beverages: water, Sparkling Ice  Exercise: runs a -stays busy with the kids, small amount weights     Nutrition Diagnosis:    Overweight/Obesity (NC 3.3) related to overeating and poor lifestyle habits as evidenced by patient's subjective statements (inability to lose weight) and BMI of 42.98 kg/m2.     Intervention:  1. Food and/or nutrient delivery: balanced meals, adequate protein   2. Nutrition education: none  3. Nutrition counseling: provided support and encouragement     Monitoring/Evaluation:    Goals:  1. Focus on protein first at each meal, aiming for 60-80 grams of protein/day.   2. Work on establishing an exercise regimen as able/tolerated.     Patient to follow up in 1 month(s) with bariatrician and prn with JUSTIN      Video-Visit Details    Type of service:  Video Visit    Video Start Time (time video started): 12:51 pm    Video End Time (time video stopped): 1:02 pm    Originating Location (pt. Location): Home        Distant Location (provider location):  Off-site    Mode of Communication:  Video Conference via [facility]    Physician has received verbal consent for a Video Visit from the patient? Yes      Pati Amador RD            Again, thank you for allowing me to participate in the care of your patient.        Sincerely,        Pati Amador RD

## 2022-12-26 ENCOUNTER — HEALTH MAINTENANCE LETTER (OUTPATIENT)
Age: 62
End: 2022-12-26

## 2023-01-06 ENCOUNTER — VIRTUAL VISIT (OUTPATIENT)
Dept: SURGERY | Facility: CLINIC | Age: 63
End: 2023-01-06
Payer: COMMERCIAL

## 2023-01-06 VITALS — WEIGHT: 250 LBS | BODY MASS INDEX: 42.68 KG/M2 | HEIGHT: 64 IN

## 2023-01-06 DIAGNOSIS — E66.01 MORBID OBESITY WITH BMI OF 40.0-44.9, ADULT (H): Primary | ICD-10-CM

## 2023-01-06 PROCEDURE — 99214 OFFICE O/P EST MOD 30 MIN: CPT | Mod: 95 | Performed by: FAMILY MEDICINE

## 2023-01-06 RX ORDER — PHENTERMINE HYDROCHLORIDE 37.5 MG/1
18.75-37.5 TABLET ORAL
Qty: 90 TABLET | Refills: 1 | Status: SHIPPED | OUTPATIENT
Start: 2023-01-06 | End: 2023-04-06

## 2023-01-06 RX ORDER — TOPIRAMATE 25 MG/1
25 TABLET, FILM COATED ORAL 2 TIMES DAILY
Qty: 180 TABLET | Refills: 3 | Status: SHIPPED | OUTPATIENT
Start: 2023-01-06 | End: 2024-01-29

## 2023-01-06 NOTE — LETTER
1/6/2023         RE: Patricia Mckeon  [address] [address]        Dear Colleague,    Thank you for referring your patient, Patricia Mckeon, to the [facility] SURGERY CLINIC AND BARIATRICS CARE [place]. Please see a copy of my visit note below.    Patricia Mckeon is 62 year old  female who presents for a billable video visit today.    How would you like to obtain your AVS? MyChart  If dropped from the video visit, the video invitation should be resent by: Text to cell phone: [phone]  Will anyone else be joining your video visit? No      Video Start Time: 1:00 PM    Are there any specific questions or needs that you would like addressed at your visit today? No concerns or complaints    Bariatric Follow-up    62 year old  female BMI:Body mass index is 42.91 kg/m .    Weight:   Wt Readings from Last 1 Encounters:   01/06/23 113.4 kg (250 lb)    pounds    Comorbidities:  Patient Active Problem List   Diagnosis     Gastroesophageal reflux disease without esophagitis     Dyslipidemia     Morbid obesity (H)       Interim: Highest weight was 335# per patient report. Here 261# was her initial weight 10/2022. She maintains an 11# weight loss since October and 85# since her high weight.  was diagnosed with Prostate Cancer. Hip is acting up. Waiting for hip injection to increase physical acvitity.  Looking forward to having her hip replaced but has to have a BMI below 40.    Plan:  DIET  Continue 3 meals each containing lean protein.    EXERCISE ADLs with  kids. Discussed non-exercise activities of thermogenesis.   PHARMACOTHERAPY refill phentermine. Topamax. If not tolerating d/t side effects could add naltrexone 25mg with dinner    Congratulated for excellent weight loss (4.2%) so far. GLP-1 RA-Saxenda and Trulicity prior auths were denied. F/U dietitian then in 3 mo.     -We reviewed her medications and whether associated with weight gain.    Current Outpatient  Medications:      Aspirin 81 MG CAPS, , Disp: , Rfl:      calcium carbonate (OS-RAJANI) 600 MG tablet, Take by mouth 2 times daily (with meals), Disp: , Rfl:      cane Liz, [CANE LIZ] Wide Base Quad Cane for home use. For 12 weeks., Disp: , Rfl:      cholecalciferol (VITAMIN D3) 125 mcg (5000 units) capsule, Take by mouth daily, Disp: , Rfl:      cyanocobalamin (VITAMIN B-12) 1000 MCG SUBL sublingual tablet, Place 1 tablet (1,000 mcg) under the tongue daily, Disp: 90 tablet, Rfl: 3     Garlic 1000 MG CAPS, , Disp: , Rfl:      Omega-3 Fatty Acids (FISH OIL) 1360 MG CAPS, , Disp: , Rfl:      omeprazole (PRILOSEC) 20 MG capsule, [OMEPRAZOLE (PRILOSEC) 20 MG CAPSULE] Take 20 mg by mouth., Disp: , Rfl:      phentermine (ADIPEX-P) 37.5 MG tablet, Take 0.5-1 tablets (18.75-37.5 mg) by mouth every morning (before breakfast) for 90 days, Disp: 90 tablet, Rfl: 1     topiramate (TOPAMAX) 25 MG tablet, Take 1 tablet (25 mg) by mouth 2 times daily, Disp: 180 tablet, Rfl: 3     UNABLE TO FIND, MEDICATION NAME: Move Free Triple Action - joints, Disp: , Rfl:      vitamin C (ASCORBIC ACID) 1000 MG TABS, Take 1,000 mg by mouth daily, Disp: , Rfl:      liraglutide - Weight Management (SAXENDA) 18 MG/3ML pen, Inject 0.6 mg Subcutaneous daily for 7 days, THEN 1.2 mg daily for 7 days, THEN 1.8 mg daily for 7 days, THEN 2.4 mg daily for 7 days, THEN 3 mg daily. (Patient not taking: Reported on 1/6/2023), Disp: 45 mL, Rfl: 3      We discussed [facility] Bariatric Basics including:  -eating 3 meals daily  -eating protein first  -eating slowly, chewing food well  -avoiding/limiting calorie containing beverages  -choosing wheat, not white with breads, crackers, pastas, rickie, bagels, tortillas, rice  -limiting restaurant or cafeteria eating to twice a week or less  -We discussed the importance of restorative sleep and stress management in maintaining a healthy weight.  -We discussed insulin resistance and glycemic index as it relates to  "appetite and weight control  -We discussed the National Weight Control Registry healthy weight maintenance strategies and ways to optimize metabolism.  -We discussed the importance of physical activity including cardiovascular and strength training in maintaining a healthier weight and explored viable options.    Most recent labs:  Lab Results   Component Value Date    WBC 6.5 10/06/2022    HGB 15.3 10/06/2022    HCT 46.2 10/06/2022    MCV 89 10/06/2022     10/06/2022       Lab Results   Component Value Date    ALT 19 10/06/2022    AST 25 10/06/2022    ALKPHOS 171 (H) 10/06/2022       Lab Results   Component Value Date    TSH 2.16 10/06/2022       DIETARY HISTORY  Meals Per Day: 3  Eating Protein First?: yes  Food Diary:   Snacks Per Day: 1  Typical Snack: Healthy Choice 100 calorie  Fluid Intake: \"I try\". Coffee and Water Ice drinks  Portion Control: improved  Calorie Containing Beverages: no  Alcohol per week: none on average-  Typical Protein Food Choices: lean  Choosing Whole Grains: yes-45 elvira Quintanilla  Grocery Shopping is done by: herself  Food Preparation is done by: herself  Meals at Restaurant/Cafeteria/Take Out Per Week: <2  Eating at the Table:   TV is Off During Meals:     Positive Changes Since Last Visit: 11# down   Struggling With: walks now with walker    Knowledgeable in Reading Food Labels:   Getting Adequate Protein: yes  Sleeping 7-8 hours/day 6 hours  Stress management puzzles    PHYSICAL ACTIVITY PATTERNS:  Cardiovascular: ADLs as  kids  Strength Training: none    REVIEW OF SYSTEMS  GENERAL/CONSTITUTIONAL:  Fatigue: OK  CARDIOVASCULAR:  Chest Pain with Exertion: no  PULMONARY:  Dyspnea on exertion: no  CPAP Use: NA  Tobacco Use: no  GASTROINTESTINAL:  GERD/Heartburn: no  UROLOGIC:  Urinary Symptoms:   NEUROLOGIC:  Headaches: no  Paresthesias: no  PSYCHIATRIC:  Moods: euthymic  MUSCULOSKELETAL/RHEUMATOLOGIC  Arthralgias: yes  Myalgias: yes  ENDOCRINE:  Monitoring Blood Sugars: " "no  Sugars Well Controlled: yes A1c 5.1  DERMATOLOGIC:  Rashes:     PHYSICAL EXAM: (most recent vitals and today's stated weight)  Vitals: Ht 1.626 m (5' 4\")   Wt 113.4 kg (250 lb)   BMI 42.91 kg/m        GEN: Pleasant and in no acute distress  PSYCH: A&OX3,     I have reviewed the note as documented above.  This accurately captures the substance of my conversation with the patient.  Thank you for the opportunity to participate in the care of your patient.    Hetal Uribe MD, FAAFP  [facility]  Diplomate, American Board of Obesity Medicine    Total time spent on the date of this encounter doing: chart review, review of test results, patient visit, physical exam, education, counseling, developing plan of care, and documenting = 30 minutes.          Video-Visit Details    Type of service:  Video Visit    Platform used for Video Visit: [employer]    Video End Time (time video stopped): 1:30    Originating Location (pt. Location): Home        Distant Location (provider location):  On-site    Distant Location (provider location):  [facility] SURGERY [facility] AND BARIATRICS CARE [place]         Again, thank you for allowing me to participate in the care of your patient.        Sincerely,        Hetal Uribe MD    "

## 2023-01-06 NOTE — PROGRESS NOTES
Patricia Mckeon is 62 year old  female who presents for a billable video visit today.    How would you like to obtain your AVS? MyChart  If dropped from the video visit, the video invitation should be resent by: Text to cell phone: [phone]  Will anyone else be joining your video visit? No      Video Start Time: 1:00 PM    Are there any specific questions or needs that you would like addressed at your visit today? No concerns or complaints    Bariatric Follow-up    62 year old  female BMI:Body mass index is 42.91 kg/m .    Weight:   Wt Readings from Last 1 Encounters:   01/06/23 113.4 kg (250 lb)    pounds    Comorbidities:  Patient Active Problem List   Diagnosis     Gastroesophageal reflux disease without esophagitis     Dyslipidemia     Morbid obesity (H)       Interim: Highest weight was 335# per patient report. Here 261# was her initial weight 10/2022. She maintains an 11# weight loss since October and 85# since her high weight.  was diagnosed with Prostate Cancer. Hip is acting up. Waiting for hip injection to increase physical acvitity.  Looking forward to having her hip replaced but has to have a BMI below 40.    Plan:  DIET  Continue 3 meals each containing lean protein.    EXERCISE ADLs with  kids. Discussed non-exercise activities of thermogenesis.   PHARMACOTHERAPY refill phentermine. Topamax. If not tolerating d/t side effects could add naltrexone 25mg with dinner    Congratulated for excellent weight loss (4.2%) so far. GLP-1 RA-Saxenda and Trulicity prior auths were denied. F/U dietitian then in 3 mo.     -We reviewed her medications and whether associated with weight gain.    Current Outpatient Medications:      Aspirin 81 MG CAPS, , Disp: , Rfl:      calcium carbonate (OS-RAJANI) 600 MG tablet, Take by mouth 2 times daily (with meals), Disp: , Rfl:      cane Liz, [CANE LIZ] Wide Base Quad Cane for home use. For 12 weeks., Disp: , Rfl:      cholecalciferol (VITAMIN D3) 125 mcg  (5000 units) capsule, Take by mouth daily, Disp: , Rfl:      cyanocobalamin (VITAMIN B-12) 1000 MCG SUBL sublingual tablet, Place 1 tablet (1,000 mcg) under the tongue daily, Disp: 90 tablet, Rfl: 3     Garlic 1000 MG CAPS, , Disp: , Rfl:      Omega-3 Fatty Acids (FISH OIL) 1360 MG CAPS, , Disp: , Rfl:      omeprazole (PRILOSEC) 20 MG capsule, [OMEPRAZOLE (PRILOSEC) 20 MG CAPSULE] Take 20 mg by mouth., Disp: , Rfl:      phentermine (ADIPEX-P) 37.5 MG tablet, Take 0.5-1 tablets (18.75-37.5 mg) by mouth every morning (before breakfast) for 90 days, Disp: 90 tablet, Rfl: 1     topiramate (TOPAMAX) 25 MG tablet, Take 1 tablet (25 mg) by mouth 2 times daily, Disp: 180 tablet, Rfl: 3     UNABLE TO FIND, MEDICATION NAME: Move Free Triple Action - joints, Disp: , Rfl:      vitamin C (ASCORBIC ACID) 1000 MG TABS, Take 1,000 mg by mouth daily, Disp: , Rfl:      liraglutide - Weight Management (SAXENDA) 18 MG/3ML pen, Inject 0.6 mg Subcutaneous daily for 7 days, THEN 1.2 mg daily for 7 days, THEN 1.8 mg daily for 7 days, THEN 2.4 mg daily for 7 days, THEN 3 mg daily. (Patient not taking: Reported on 1/6/2023), Disp: 45 mL, Rfl: 3      We discussed [facility] Bariatric Basics including:  -eating 3 meals daily  -eating protein first  -eating slowly, chewing food well  -avoiding/limiting calorie containing beverages  -choosing wheat, not white with breads, crackers, pastas, rickie, bagels, tortillas, rice  -limiting restaurant or cafeteria eating to twice a week or less  -We discussed the importance of restorative sleep and stress management in maintaining a healthy weight.  -We discussed insulin resistance and glycemic index as it relates to appetite and weight control  -We discussed the National Weight Control Registry healthy weight maintenance strategies and ways to optimize metabolism.  -We discussed the importance of physical activity including cardiovascular and strength training in maintaining a healthier weight and  "explored viable options.    Most recent labs:  Lab Results   Component Value Date    WBC 6.5 10/06/2022    HGB 15.3 10/06/2022    HCT 46.2 10/06/2022    MCV 89 10/06/2022     10/06/2022       Lab Results   Component Value Date    ALT 19 10/06/2022    AST 25 10/06/2022    ALKPHOS 171 (H) 10/06/2022       Lab Results   Component Value Date    TSH 2.16 10/06/2022       DIETARY HISTORY  Meals Per Day: 3  Eating Protein First?: yes  Food Diary:   Snacks Per Day: 1  Typical Snack: Healthy Choice 100 calorie  Fluid Intake: \"I try\". Coffee and Water Ice drinks  Portion Control: improved  Calorie Containing Beverages: no  Alcohol per week: none on average-  Typical Protein Food Choices: lean  Choosing Whole Grains: yes-45 elvira Quintanilla  Grocery Shopping is done by: herself  Food Preparation is done by: herself  Meals at Restaurant/Cafeteria/Take Out Per Week: <2  Eating at the Table:   TV is Off During Meals:     Positive Changes Since Last Visit: 11# down   Struggling With: walks now with walker    Knowledgeable in Reading Food Labels:   Getting Adequate Protein: yes  Sleeping 7-8 hours/day 6 hours  Stress management puzzles    PHYSICAL ACTIVITY PATTERNS:  Cardiovascular: ADLs as  kids  Strength Training: none    REVIEW OF SYSTEMS  GENERAL/CONSTITUTIONAL:  Fatigue: OK  CARDIOVASCULAR:  Chest Pain with Exertion: no  PULMONARY:  Dyspnea on exertion: no  CPAP Use: NA  Tobacco Use: no  GASTROINTESTINAL:  GERD/Heartburn: no  UROLOGIC:  Urinary Symptoms:   NEUROLOGIC:  Headaches: no  Paresthesias: no  PSYCHIATRIC:  Moods: euthymic  MUSCULOSKELETAL/RHEUMATOLOGIC  Arthralgias: yes  Myalgias: yes  ENDOCRINE:  Monitoring Blood Sugars: no  Sugars Well Controlled: yes A1c 5.1  DERMATOLOGIC:  Rashes:     PHYSICAL EXAM: (most recent vitals and today's stated weight)  Vitals: Ht 1.626 m (5' 4\")   Wt 113.4 kg (250 lb)   BMI 42.91 kg/m        GEN: Pleasant and in no acute distress  PSYCH: A&OX3,     I have reviewed the note " as documented above.  This accurately captures the substance of my conversation with the patient.  Thank you for the opportunity to participate in the care of your patient.    Hetal Uribe MD, FAAFP  [facility]  Diplomate, American Board of Obesity Medicine    Total time spent on the date of this encounter doing: chart review, review of test results, patient visit, physical exam, education, counseling, developing plan of care, and documenting = 30 minutes.          Video-Visit Details    Type of service:  Video Visit    Platform used for Video Visit: [employer]    Video End Time (time video stopped): 1:30    Originating Location (pt. Location): Home        Distant Location (provider location):  On-site    Distant Location (provider location):  [facility] SURGERY [facility] AND BARIATRICS [facility]

## 2023-02-14 ENCOUNTER — TRANSFERRED RECORDS (OUTPATIENT)
Dept: HEALTH INFORMATION MANAGEMENT | Facility: CLINIC | Age: 63
End: 2023-02-14

## 2023-04-07 ENCOUNTER — VIRTUAL VISIT (OUTPATIENT)
Dept: SURGERY | Facility: CLINIC | Age: 63
End: 2023-04-07
Payer: COMMERCIAL

## 2023-04-07 VITALS — BODY MASS INDEX: 40.8 KG/M2 | HEIGHT: 64 IN | WEIGHT: 239 LBS

## 2023-04-07 DIAGNOSIS — E66.01 MORBID OBESITY (H): Primary | ICD-10-CM

## 2023-04-07 PROCEDURE — 99213 OFFICE O/P EST LOW 20 MIN: CPT | Mod: VID | Performed by: FAMILY MEDICINE

## 2023-04-07 NOTE — PROGRESS NOTES
aPtricia Mckeon is 62 year old  female who presents for a billable video visit today.    How would you like to obtain your AVS? MyChart  If dropped from the video visit, the video invitation should be resent by: Send to e-mail at: [email or web address]  Will anyone else be joining your video visit? No      Video Start Time: 1:05    Are there any specific questions or needs that you would like addressed at your visit today?     3 mo follow up, weight management. No reported concerns.    3 mo follow up scheduled    Bariatric Follow-up    62 year old  female BMI:Body mass index is 41.02 kg/m .    Weight:   Wt Readings from Last 1 Encounters:   04/07/23 108.4 kg (239 lb)    pounds    Comorbidities:  Patient Active Problem List   Diagnosis     Gastroesophageal reflux disease without esophagitis     Dyslipidemia     Morbid obesity (H)     Interim: Phentermine 1 in the am and 1 topamax BID. Maintaining a 22# weight loss. Has 9# to lose before her hip replacement. Had done pool therapy then a lot of walking in [place]. Shot wore off now pain is back and pain is limited. Can't drink diet coke anymore.    Plan:  DIET  Continue 3 meals, lean proteins RD guidance   EXERCISE as able and as pain allows   PHARMACOTHERAPY continue phentermine and Topamax    Congratulated on her 22# weight loss. Encouraged to stay the course. 6# more will put her at 39.99 BMI and needed a BMI <40 for hip replacement. She looks forward to being able to increase her mobility after he hip replacement.      -We reviewed her medications and whether associated with weight gain.    Current Outpatient Medications:      Aspirin 81 MG CAPS, , Disp: , Rfl:      calcium carbonate (OS-RAJANI) 600 MG tablet, Take by mouth 2 times daily (with meals), Disp: , Rfl:      cane Dorothy, [CANE DOROTHY] Wide Base Quad Cane for home use. For 12 weeks., Disp: , Rfl:      cholecalciferol (VITAMIN D3) 125 mcg (5000 units) capsule, Take by mouth daily, Disp: , Rfl:       cyanocobalamin (VITAMIN B-12) 1000 MCG SUBL sublingual tablet, Place 1 tablet (1,000 mcg) under the tongue daily, Disp: 90 tablet, Rfl: 3     Garlic 1000 MG CAPS, , Disp: , Rfl:      Omega-3 Fatty Acids (FISH OIL) 1360 MG CAPS, , Disp: , Rfl:      omeprazole (PRILOSEC) 20 MG capsule, [OMEPRAZOLE (PRILOSEC) 20 MG CAPSULE] Take 20 mg by mouth., Disp: , Rfl:      topiramate (TOPAMAX) 25 MG tablet, Take 1 tablet (25 mg) by mouth 2 times daily, Disp: 180 tablet, Rfl: 3     vitamin C (ASCORBIC ACID) 1000 MG TABS, Take 1,000 mg by mouth daily, Disp: , Rfl:      liraglutide - Weight Management (SAXENDA) 18 MG/3ML pen, Inject 0.6 mg Subcutaneous daily for 7 days, THEN 1.2 mg daily for 7 days, THEN 1.8 mg daily for 7 days, THEN 2.4 mg daily for 7 days, THEN 3 mg daily. (Patient not taking: Reported on 1/6/2023), Disp: 45 mL, Rfl: 3     UNABLE TO FIND, MEDICATION NAME: Move Free Triple Action - joints, Disp: , Rfl:       We discussed [facility] Bariatric Basics including:  -eating 3 meals daily  -eating protein first  -eating slowly, chewing food well  -avoiding/limiting calorie containing beverages  -choosing wheat, not white with breads, crackers, pastas, rickie, bagels, tortillas, rice  -limiting restaurant or cafeteria eating to twice a week or less  -We discussed the importance of restorative sleep and stress management in maintaining a healthy weight.  -We discussed insulin resistance and glycemic index as it relates to appetite and weight control  -We discussed the National Weight Control Registry healthy weight maintenance strategies and ways to optimize metabolism.  -We discussed the importance of physical activity including cardiovascular and strength training in maintaining a healthier weight and explored viable options.    Most recent labs:  Lab Results   Component Value Date    WBC 6.5 10/06/2022    HGB 15.3 10/06/2022    HCT 46.2 10/06/2022    MCV 89 10/06/2022     10/06/2022       Lab Results   Component  "Value Date    ALT 19 10/06/2022    AST 25 10/06/2022    ALKPHOS 171 (H) 10/06/2022       Lab Results   Component Value Date    TSH 2.16 10/06/2022         DIETARY HISTORY  Meals Per Day: 3  Eating Protein First?: sometimes  Food Diary: B:banana then cottage cheese and blueberries, raspberries and greek yogurt L:turkey, carrots, apple, tomatoes, eggs D:kale salad from [employer] Filet and 6 nuggets grilled  Snacks Per Day: the banana may be considered a snack.   Typical Snack: at night Healthy Choice Fudge bar  Fluid Intake: intentional  Portion Control: improved  Calorie Containing Beverages: no  Alcohol per week: no  Typical Protein Food Choices: variety  Choosing Whole Grains: yes  Grocery Shopping is done by: herself  Food Preparation is done by: herself  Meals at Restaurant/Cafeteria/Take Out Per Week: few  Eating at the Table: typically  TV is Off During Meals: thpically    Positive Changes Since Last Visit: 22# weight loss overall  Struggling With:     Knowledgeable in Reading Food Labels:   Getting Adequate Protein: likely  Sleeping 7-8 hours/day short but tries   Stress management not much.     PHYSICAL ACTIVITY PATTERNS:  Cardiovascular: trying to walk  Strength Training: no    REVIEW OF SYSTEMS  GENERAL/CONSTITUTIONAL:  Fatigue: better  CARDIOVASCULAR:  Chest Pain with Exertion: no  PULMONARY:  Dyspnea on exertion: no  CPAP Use: no  Tobacco Use: no  GASTROINTESTINAL:  GERD/Heartburn:   UROLOGIC:  Urinary Symptoms:   NEUROLOGIC:  Headaches:   Paresthesias:   PSYCHIATRIC:  Moods: euthymic  MUSCULOSKELETAL/RHEUMATOLOGIC  Arthralgias: yes  Myalgias: yes  ENDOCRINE:  Monitoring Blood Sugars: no  Sugars Well Controlled: yes  DERMATOLOGIC:  Rashes:     PHYSICAL EXAM: (most recent vitals and today's stated weight)  Vitals: Ht 1.626 m (5' 4\")   Wt 108.4 kg (239 lb)   BMI 41.02 kg/m        GEN: Pleasant and in no acute distress  PSYCH: A&OX3,     I have reviewed the note as documented above.  This accurately captures " the substance of my conversation with the patient.  Thank you for the opportunity to participate in the care of your patient.    Hetal Uribe MD, FAAFP  [facility]  Diplomate, American Board of Obesity Medicine    Total time spent on the date of this encounter doing: chart review, review of test results, patient visit, physical exam, education, counseling, developing plan of care, and documenting = 20 minutes.          Video-Visit Details    Type of service:  Video Visit    Platform used for Video Visit: [employer]    Video End Time (time video stopped): 1:25    Originating Location (pt. Location): Home        Distant Location (provider location):  On-site    Distant Location (provider location):  [facility] SURGERY [facility] AND BARIATRICS [facility]

## 2023-04-07 NOTE — LETTER
4/7/2023         RE: Patricia Mckeon  [address] [address]        Dear Colleague,    Thank you for referring your patient, Patricia Mckeon, to the [facility] AND BARIATRICS CARE [place]. Please see a copy of my visit note below.    Patricia Mckeon is 62 year old  female who presents for a billable video visit today.    How would you like to obtain your AVS? MyChart  If dropped from the video visit, the video invitation should be resent by: Send to e-mail at: [email or web address]  Will anyone else be joining your video visit? No      Video Start Time: 1:05    Are there any specific questions or needs that you would like addressed at your visit today?     3 mo follow up, weight management. No reported concerns.    3 mo follow up scheduled    Bariatric Follow-up    62 year old  female BMI:Body mass index is 41.02 kg/m .    Weight:   Wt Readings from Last 1 Encounters:   04/07/23 108.4 kg (239 lb)    pounds    Comorbidities:  Patient Active Problem List   Diagnosis     Gastroesophageal reflux disease without esophagitis     Dyslipidemia     Morbid obesity (H)     Interim: Phentermine 1 in the am and 1 topamax BID. Maintaining a 22# weight loss. Has 9# to lose before her hip replacement. Had done pool therapy then a lot of walking in [place]. Shot wore off now pain is back and pain is limited. Can't drink diet coke anymore.    Plan:  DIET  Continue 3 meals, lean proteins RD guidance   EXERCISE as able and as pain allows   PHARMACOTHERAPY continue phentermine and Topamax    Congratulated on her 22# weight loss. Encouraged to stay the course. 6# more will put her at 39.99 BMI and needed a BMI <40 for hip replacement. She looks forward to being able to increase her mobility after he hip replacement.      -We reviewed her medications and whether associated with weight gain.    Current Outpatient Medications:      Aspirin 81 MG CAPS, , Disp: , Rfl:      calcium  carbonate (OS-RAJANI) 600 MG tablet, Take by mouth 2 times daily (with meals), Disp: , Rfl:      cane Liz, [CANE LIZ] Wide Base Quad Cane for home use. For 12 weeks., Disp: , Rfl:      cholecalciferol (VITAMIN D3) 125 mcg (5000 units) capsule, Take by mouth daily, Disp: , Rfl:      cyanocobalamin (VITAMIN B-12) 1000 MCG SUBL sublingual tablet, Place 1 tablet (1,000 mcg) under the tongue daily, Disp: 90 tablet, Rfl: 3     Garlic 1000 MG CAPS, , Disp: , Rfl:      Omega-3 Fatty Acids (FISH OIL) 1360 MG CAPS, , Disp: , Rfl:      omeprazole (PRILOSEC) 20 MG capsule, [OMEPRAZOLE (PRILOSEC) 20 MG CAPSULE] Take 20 mg by mouth., Disp: , Rfl:      topiramate (TOPAMAX) 25 MG tablet, Take 1 tablet (25 mg) by mouth 2 times daily, Disp: 180 tablet, Rfl: 3     vitamin C (ASCORBIC ACID) 1000 MG TABS, Take 1,000 mg by mouth daily, Disp: , Rfl:      liraglutide - Weight Management (SAXENDA) 18 MG/3ML pen, Inject 0.6 mg Subcutaneous daily for 7 days, THEN 1.2 mg daily for 7 days, THEN 1.8 mg daily for 7 days, THEN 2.4 mg daily for 7 days, THEN 3 mg daily. (Patient not taking: Reported on 1/6/2023), Disp: 45 mL, Rfl: 3     UNABLE TO FIND, MEDICATION NAME: Move Free Triple Action - joints, Disp: , Rfl:       We discussed [facility] Bariatric Basics including:  -eating 3 meals daily  -eating protein first  -eating slowly, chewing food well  -avoiding/limiting calorie containing beverages  -choosing wheat, not white with breads, crackers, pastas, rickie, bagels, tortillas, rice  -limiting restaurant or cafeteria eating to twice a week or less  -We discussed the importance of restorative sleep and stress management in maintaining a healthy weight.  -We discussed insulin resistance and glycemic index as it relates to appetite and weight control  -We discussed the National Weight Control Registry healthy weight maintenance strategies and ways to optimize metabolism.  -We discussed the importance of physical activity including cardiovascular and  strength training in maintaining a healthier weight and explored viable options.    Most recent labs:  Lab Results   Component Value Date    WBC 6.5 10/06/2022    HGB 15.3 10/06/2022    HCT 46.2 10/06/2022    MCV 89 10/06/2022     10/06/2022       Lab Results   Component Value Date    ALT 19 10/06/2022    AST 25 10/06/2022    ALKPHOS 171 (H) 10/06/2022       Lab Results   Component Value Date    TSH 2.16 10/06/2022         DIETARY HISTORY  Meals Per Day: 3  Eating Protein First?: sometimes  Food Diary: B:banana then cottage cheese and blueberries, raspberries and greek yogurt L:turkey, carrots, apple, tomatoes, eggs D:kale salad from [employer] Filet and 6 nuggets grilled  Snacks Per Day: the banana may be considered a snack.   Typical Snack: at night Healthy Choice Fudge bar  Fluid Intake: intentional  Portion Control: improved  Calorie Containing Beverages: no  Alcohol per week: no  Typical Protein Food Choices: variety  Choosing Whole Grains: yes  Grocery Shopping is done by: herself  Food Preparation is done by: herself  Meals at Restaurant/Cafeteria/Take Out Per Week: few  Eating at the Table: typically  TV is Off During Meals: thpically    Positive Changes Since Last Visit: 22# weight loss overall  Struggling With:     Knowledgeable in Reading Food Labels:   Getting Adequate Protein: likely  Sleeping 7-8 hours/day short but tries   Stress management not much.     PHYSICAL ACTIVITY PATTERNS:  Cardiovascular: trying to walk  Strength Training: no    REVIEW OF SYSTEMS  GENERAL/CONSTITUTIONAL:  Fatigue: better  CARDIOVASCULAR:  Chest Pain with Exertion: no  PULMONARY:  Dyspnea on exertion: no  CPAP Use: no  Tobacco Use: no  GASTROINTESTINAL:  GERD/Heartburn:   UROLOGIC:  Urinary Symptoms:   NEUROLOGIC:  Headaches:   Paresthesias:   PSYCHIATRIC:  Moods: euthymic  MUSCULOSKELETAL/RHEUMATOLOGIC  Arthralgias: yes  Myalgias: yes  ENDOCRINE:  Monitoring Blood Sugars: no  Sugars Well Controlled:  "yes  DERMATOLOGIC:  Rashes:     PHYSICAL EXAM: (most recent vitals and today's stated weight)  Vitals: Ht 1.626 m (5' 4\")   Wt 108.4 kg (239 lb)   BMI 41.02 kg/m        GEN: Pleasant and in no acute distress  PSYCH: A&OX3,     I have reviewed the note as documented above.  This accurately captures the substance of my conversation with the patient.  Thank you for the opportunity to participate in the care of your patient.    Hetal Uribe MD, FAAFP  [facility]  Diplomate, American Board of Obesity Medicine    Total time spent on the date of this encounter doing: chart review, review of test results, patient visit, physical exam, education, counseling, developing plan of care, and documenting = 20 minutes.          Video-Visit Details    Type of service:  Video Visit    Platform used for Video Visit: [employer]    Video End Time (time video stopped): 1:25    Originating Location (pt. Location): Home        Distant Location (provider location):  On-site    Distant Location (provider location):  [facility] SURGERY [facility] AND BARIATRICS [facility]       Again, thank you for allowing me to participate in the care of your patient.        Sincerely,        Hetal Uribe MD    "

## 2023-04-17 ENCOUNTER — VIRTUAL VISIT (OUTPATIENT)
Dept: SURGERY | Facility: CLINIC | Age: 63
End: 2023-04-17
Payer: COMMERCIAL

## 2023-04-17 DIAGNOSIS — Z71.3 NUTRITIONAL COUNSELING: ICD-10-CM

## 2023-04-17 DIAGNOSIS — E78.5 DYSLIPIDEMIA: ICD-10-CM

## 2023-04-17 DIAGNOSIS — E66.01 CLASS 3 SEVERE OBESITY DUE TO EXCESS CALORIES WITH SERIOUS COMORBIDITY AND BODY MASS INDEX (BMI) OF 40.0 TO 44.9 IN ADULT (H): Primary | ICD-10-CM

## 2023-04-17 DIAGNOSIS — E66.813 CLASS 3 SEVERE OBESITY DUE TO EXCESS CALORIES WITH SERIOUS COMORBIDITY AND BODY MASS INDEX (BMI) OF 40.0 TO 44.9 IN ADULT (H): Primary | ICD-10-CM

## 2023-04-17 PROCEDURE — 97803 MED NUTRITION INDIV SUBSEQ: CPT | Mod: VID | Performed by: DIETITIAN, REGISTERED

## 2023-04-17 NOTE — PROGRESS NOTES
Patricia Mckeon is a 62 year old who is being evaluated via a billable video visit.    How would you like to obtain your AVS? MyChart  If the video visit is dropped, the invitation should be resent by: Send to e-mail at: [email or web address]  Will anyone else be joining your video visit? No        Medical  Weight Loss Follow-Up Diet Evaluation  Assessment:  Patricia is presenting today for a follow up weight management nutrition consultation.  This patient has had an initial appointment and was referred by Dr. Uribe for MNT as treatment for Obesity which is impacting her Dyslipidemia.     Weight loss medication: Phentermine. Topamax  Pt's weight is 238 lbs  Initial weight: 261.2 lbs  Weight change: 23.2 lbs (down)         View : No data to display.              BMI: There is no height or weight on file to calculate BMI.  Ideal body weight: 54.7 kg (120 lb 9.5 oz)  Adjusted ideal body weight: 76.2 kg (167 lb 15.3 oz)    Estimated RMR ([place]-St Jeor equation):   1617 kcals x 1.3 (light active) = 2102 kcals (for weight maintenance)     Recommended Protein Intake: 60-80 grams of protein/day  Patient Active Problem List:  Patient Active Problem List   Diagnosis     Gastroesophageal reflux disease without esophagitis     Dyslipidemia     Morbid obesity (H)     Diabetes: No    Progress on goals from last visit: Continues to track intake via WW points. Continues to work on eating her protein first at each meals.      Dietary Recall:  Breakfast: Cottage Cheese, Blueberries/Raspberries, Peaches   Lunch: meat, apple, carrot or salad with peas, 2 oz turkey, light Italian Dressing   Dinner:pork or chicken or beef, veggies, occasional boiled potato with FF sour cream and spray butter  Typical snacks: Banana or Fig Bars or or Fiber One Bar or Z Bars  Beverages: Water-feels that she is doing well with adequate intake, Coffee in the AM, Sparkling Ice  Exercise: some small weights, having some hip issues lately    Nutrition  Diagnosis:    Overweight/Obesity (NC 3.3) related to overeating and poor lifestyle habits as evidenced by patient's subjective statements and BMI of 40.9 kg/m2.       Intervention:  1. Food and/or nutrient delivery: balanced meals, adequate protein   2. Nutrition education: eating out tips  3. Nutrition counseling: provided support and encouragement    Monitoring/Evaluation:    Goals:  1. Continue to focus on protein first, aiming for 60-80 grams of protein/day.   2. Establish an exercise regimen as able/tolerated pending hip issues.     Patient to follow up in 3 month(s) with bariatrician and 4 month(s) with RD      Video-Visit Details    Type of service:  Video Visit    Video Start Time (time video started): 12:48 pm    Video End Time (time video stopped): 1:07 pm    Originating Location (pt. Location): Home        Distant Location (provider location):  Off-site    Mode of Communication:  Video Conference via [facility]    Physician has received verbal consent for a Video Visit from the patient? Yes      Pati Amador, JUSTIN

## 2023-04-17 NOTE — LETTER
4/17/2023         RE: Patricia Mckeon  [address] [address]        Dear Colleague,    Thank you for referring your patient, Patricia Mckeon, to the [facility] SURGERY CLINIC AND BARIATRICS CARE [place]. Please see a copy of my visit note below.    Patricia Mckeon is a 62 year old who is being evaluated via a billable video visit.    How would you like to obtain your AVS? MyChart  If the video visit is dropped, the invitation should be resent by: Send to e-mail at: [email or web address]  Will anyone else be joining your video visit? No        Medical  Weight Loss Follow-Up Diet Evaluation  Assessment:  Patricia is presenting today for a follow up weight management nutrition consultation.  This patient has had an initial appointment and was referred by Dr. Uribe for MNT as treatment for Obesity which is impacting her Dyslipidemia.     Weight loss medication: Phentermine. Topamax  Pt's weight is 238 lbs  Initial weight: 261.2 lbs  Weight change: 23.2 lbs (down)         View : No data to display.              BMI: There is no height or weight on file to calculate BMI.  Ideal body weight: 54.7 kg (120 lb 9.5 oz)  Adjusted ideal body weight: 76.2 kg (167 lb 15.3 oz)    Estimated RMR ([place]-St Jeor equation):   1617 kcals x 1.3 (light active) = 2102 kcals (for weight maintenance)     Recommended Protein Intake: 60-80 grams of protein/day  Patient Active Problem List:  Patient Active Problem List   Diagnosis     Gastroesophageal reflux disease without esophagitis     Dyslipidemia     Morbid obesity (H)     Diabetes: No    Progress on goals from last visit: Continues to track intake via WW points. Continues to work on eating her protein first at each meals.      Dietary Recall:  Breakfast: Cottage Cheese, Blueberries/Raspberries, Peaches   Lunch: meat, apple, carrot or salad with peas, 2 oz turkey, light Italian Dressing   Dinner:pork or chicken or beef, veggies, occasional boiled potato  with FF sour cream and spray butter  Typical snacks: Banana or Fig Bars or or Fiber One Bar or Z Bars  Beverages: Water-feels that she is doing well with adequate intake, Coffee in the AM, Sparkling Ice  Exercise: some small weights, having some hip issues lately    Nutrition Diagnosis:    Overweight/Obesity (NC 3.3) related to overeating and poor lifestyle habits as evidenced by patient's subjective statements and BMI of 40.9 kg/m2.       Intervention:  1. Food and/or nutrient delivery: balanced meals, adequate protein   2. Nutrition education: eating out tips  3. Nutrition counseling: provided support and encouragement    Monitoring/Evaluation:    Goals:  1. Continue to focus on protein first, aiming for 60-80 grams of protein/day.   2. Establish an exercise regimen as able/tolerated pending hip issues.     Patient to follow up in 3 month(s) with bariatrician and 4 month(s) with RD      Video-Visit Details    Type of service:  Video Visit    Video Start Time (time video started): 12:48 pm    Video End Time (time video stopped): 1:07 pm    Originating Location (pt. Location): Home        Distant Location (provider location):  Off-site    Mode of Communication:  Video Conference via [facility]    Physician has received verbal consent for a Video Visit from the patient? Yes      Pati Amador RD            Again, thank you for allowing me to participate in the care of your patient.        Sincerely,        Pati Amador RD

## 2023-05-02 ENCOUNTER — TRANSFERRED RECORDS (OUTPATIENT)
Dept: HEALTH INFORMATION MANAGEMENT | Facility: CLINIC | Age: 63
End: 2023-05-02
Payer: COMMERCIAL

## 2023-06-08 ENCOUNTER — OFFICE VISIT (OUTPATIENT)
Dept: FAMILY MEDICINE | Facility: CLINIC | Age: 63
End: 2023-06-08
Payer: COMMERCIAL

## 2023-06-08 VITALS
BODY MASS INDEX: 39.71 KG/M2 | WEIGHT: 232.6 LBS | HEART RATE: 88 BPM | DIASTOLIC BLOOD PRESSURE: 70 MMHG | SYSTOLIC BLOOD PRESSURE: 128 MMHG | TEMPERATURE: 98.4 F | RESPIRATION RATE: 18 BRPM | HEIGHT: 64 IN

## 2023-06-08 DIAGNOSIS — R01.1 HEART MURMUR: ICD-10-CM

## 2023-06-08 DIAGNOSIS — Z01.818 PREOP GENERAL PHYSICAL EXAM: Primary | ICD-10-CM

## 2023-06-08 DIAGNOSIS — L91.8 SKIN TAG: ICD-10-CM

## 2023-06-08 DIAGNOSIS — Z13.220 SCREENING FOR HYPERLIPIDEMIA: ICD-10-CM

## 2023-06-08 LAB
ALBUMIN SERPL BCG-MCNC: 4.5 G/DL (ref 3.5–5.2)
ALP SERPL-CCNC: 128 U/L (ref 35–104)
ALT SERPL W P-5'-P-CCNC: 17 U/L (ref 10–35)
ANION GAP SERPL CALCULATED.3IONS-SCNC: 12 MMOL/L (ref 7–15)
AST SERPL W P-5'-P-CCNC: 20 U/L (ref 10–35)
ATRIAL RATE - MUSE: 82 BPM
BILIRUB SERPL-MCNC: 0.4 MG/DL
BUN SERPL-MCNC: 19.4 MG/DL (ref 8–23)
CALCIUM SERPL-MCNC: 9.3 MG/DL (ref 8.8–10.2)
CHLORIDE SERPL-SCNC: 103 MMOL/L (ref 98–107)
CHOLEST SERPL-MCNC: 206 MG/DL
CREAT SERPL-MCNC: 0.77 MG/DL (ref 0.51–0.95)
DEPRECATED HCO3 PLAS-SCNC: 24 MMOL/L (ref 22–29)
DIASTOLIC BLOOD PRESSURE - MUSE: NORMAL MMHG
ERYTHROCYTE [DISTWIDTH] IN BLOOD BY AUTOMATED COUNT: 12.2 % (ref 10–15)
GFR SERPL CREATININE-BSD FRML MDRD: 86 ML/MIN/1.73M2
GLUCOSE SERPL-MCNC: 101 MG/DL (ref 70–99)
HCT VFR BLD AUTO: 43.3 % (ref 35–47)
HDLC SERPL-MCNC: 45 MG/DL
HGB BLD-MCNC: 14.7 G/DL (ref 11.7–15.7)
INTERPRETATION ECG - MUSE: NORMAL
LDLC SERPL CALC-MCNC: 133 MG/DL
MCH RBC QN AUTO: 30.6 PG (ref 26.5–33)
MCHC RBC AUTO-ENTMCNC: 33.9 G/DL (ref 31.5–36.5)
MCV RBC AUTO: 90 FL (ref 78–100)
NONHDLC SERPL-MCNC: 161 MG/DL
P AXIS - MUSE: 58 DEGREES
PLATELET # BLD AUTO: 312 10E3/UL (ref 150–450)
POTASSIUM SERPL-SCNC: 4.4 MMOL/L (ref 3.4–5.3)
PR INTERVAL - MUSE: 180 MS
PROT SERPL-MCNC: 7.5 G/DL (ref 6.4–8.3)
QRS DURATION - MUSE: 86 MS
QT - MUSE: 362 MS
QTC - MUSE: 422 MS
R AXIS - MUSE: 10 DEGREES
RBC # BLD AUTO: 4.8 10E6/UL (ref 3.8–5.2)
SODIUM SERPL-SCNC: 139 MMOL/L (ref 136–145)
SYSTOLIC BLOOD PRESSURE - MUSE: NORMAL MMHG
T AXIS - MUSE: 45 DEGREES
TRIGL SERPL-MCNC: 139 MG/DL
VENTRICULAR RATE- MUSE: 82 BPM
WBC # BLD AUTO: 8 10E3/UL (ref 4–11)

## 2023-06-08 PROCEDURE — 93010 ELECTROCARDIOGRAM REPORT: CPT | Performed by: INTERNAL MEDICINE

## 2023-06-08 PROCEDURE — 80061 LIPID PANEL: CPT

## 2023-06-08 PROCEDURE — 36415 COLL VENOUS BLD VENIPUNCTURE: CPT

## 2023-06-08 PROCEDURE — 99214 OFFICE O/P EST MOD 30 MIN: CPT | Mod: 25

## 2023-06-08 PROCEDURE — 80053 COMPREHEN METABOLIC PANEL: CPT

## 2023-06-08 PROCEDURE — 85027 COMPLETE CBC AUTOMATED: CPT

## 2023-06-08 PROCEDURE — 93005 ELECTROCARDIOGRAM TRACING: CPT | Mod: 59

## 2023-06-08 PROCEDURE — 11200 RMVL SKIN TAGS UP TO&INC 15: CPT

## 2023-06-08 RX ORDER — PHENTERMINE HYDROCHLORIDE 37.5 MG/1
.5-1 TABLET ORAL
COMMUNITY
Start: 2023-04-23 | End: 2023-07-07

## 2023-06-08 RX ORDER — LIDOCAINE HYDROCHLORIDE AND EPINEPHRINE 10; 10 MG/ML; UG/ML
1 INJECTION, SOLUTION INFILTRATION; PERINEURAL ONCE
Status: COMPLETED | OUTPATIENT
Start: 2023-06-08 | End: 2023-06-08

## 2023-06-08 RX ADMIN — LIDOCAINE HYDROCHLORIDE AND EPINEPHRINE 1 ML: 10; 10 INJECTION, SOLUTION INFILTRATION; PERINEURAL at 14:37

## 2023-06-08 ASSESSMENT — PAIN SCALES - GENERAL: PAINLEVEL: MODERATE PAIN (4)

## 2023-06-08 NOTE — PATIENT INSTRUCTIONS
For informational purposes only. Not to replace the advice of your health care provider. Copyright   2003,  [facility]. All rights reserved. Clinically reviewed by Clover Humphrey MD. [employer] [id number] - REV .  Preparing for Your Surgery  Getting started  A nurse will call you to review your health history and instructions. They will give you an arrival time based on your scheduled surgery time. Please be ready to share:  Your doctor's clinic name and phone number  Your medical, surgical, and anesthesia history  A list of allergies and sensitivities  A list of medicines, including herbal treatments and over-the-counter drugs  Whether the patient has a legal guardian (ask how to send us the papers in advance)  Please tell us if you're pregnant--or if there's any chance you might be pregnant. Some surgeries may injure a fetus (unborn baby), so they require a pregnancy test. Surgeries that are safe for a fetus don't always need a test, and you can choose whether to have one.   If you have a child who's having surgery, please ask for a copy of Preparing for Your Child's Surgery.    Preparing for surgery  Within 10 to 30 days of surgery: Have a pre-op exam (sometimes called an H&P, or History and Physical). This can be done at a clinic or pre-operative center.  If you're having a , you may not need this exam. Talk to your care team.  At your pre-op exam, talk to your care team about all medicines you take. If you need to stop any medicines before surgery, ask when to start taking them again.  We do this for your safety. Many medicines can make you bleed too much during surgery. Some change how well surgery (anesthesia) drugs work.  Call your insurance company to let them know you're having surgery. (If you don't have insurance, call [phone].)  Call your clinic if there's any change in your health. This includes signs of a cold or flu (sore throat, runny nose, cough, rash, fever). It  also includes a scrape or scratch near the surgery site.  If you have questions on the day of surgery, call your hospital or surgery center.  Eating and drinking guidelines  For your safety: Unless your surgeon tells you otherwise, follow the guidelines below.  Eat and drink as usual until 8 hours before you arrive for surgery. After that, no food or milk.  Drink clear liquids until 2 hours before you arrive. These are liquids you can see through, like water, Gatorade, and Propel Water. They also include plain black coffee and tea (no cream or milk), candy, and breath mints. You can spit out gum when you arrive.  If you drink alcohol: Stop drinking it the night before surgery.  If your care team tells you to take medicine on the morning of surgery, it's okay to take it with a sip of water.  Preventing infection  Shower or bathe the night before and morning of your surgery. Follow the instructions your clinic gave you. (If no instructions, use regular soap.)  Don't shave or clip hair near your surgery site. We'll remove the hair if needed.  Don't smoke or vape the morning of surgery. You may chew nicotine gum up to 2 hours before surgery. A nicotine patch is okay.  Note: Some surgeries require you to completely quit smoking and nicotine. Check with your surgeon.  Your care team will make every effort to keep you safe from infection. We will:  Clean our hands often with soap and water (or an alcohol-based hand rub).  Clean the skin at your surgery site with a special soap that kills germs.  Give you a special gown to keep you warm. (Cold raises the risk of infection.)  Wear special hair covers, masks, gowns and gloves during surgery.  Give antibiotic medicine, if prescribed. Not all surgeries need antibiotics.  What to bring on the day of surgery  Photo ID and insurance card  Copy of your health care directive, if you have one  Glasses and hearing aids (bring cases)  You can't wear contacts during surgery  Inhaler and  eye drops, if you use them (tell us about these when you arrive)  CPAP machine or breathing device, if you use them  A few personal items, if spending the night  If you have . . .  A pacemaker, ICD (cardiac defibrillator) or other implant: Bring the ID card.  An implanted stimulator: Bring the remote control.  A legal guardian: Bring a copy of the certified (court-stamped) guardianship papers.  Please remove any jewelry, including body piercings. Leave jewelry and other valuables at home.  If you're going home the day of surgery  You must have a responsible adult drive you home. They should stay with you overnight as well.  If you don't have someone to stay with you, and you aren't safe to go home alone, we may keep you overnight. Insurance often won't pay for this.  After surgery  If it's hard to control your pain or you need more pain medicine, please call your surgeon's office.  Questions?   If you have any questions for your care team, list them here: _________________________________________________________________________________________________________________________________________________________________________ ____________________________________ ____________________________________ ____________________________________    How to Take Your Medication Before Surgery  Hold your aspirin 10 days prior to surgery  Hold your phentermine 7 days prior to surgery   You can take your omeprazole and Topamax the morning of surgery with a small sip of water  Hold vitamins and supplements 14 days prior to surgery

## 2023-06-08 NOTE — PROGRESS NOTES
[address] [address]  Phone: [phone]  Fax: [phone]  Primary Provider: Nikolas Tejada  Pre-op Performing Provider: PERRI SEBASTIAN      PREOPERATIVE EVALUATION:  Today's date: 6/8/2023    Patricia Mckeon is a 62 year old female who presents for a preoperative evaluation.      6/8/2023     1:44 PM   Additional Questions   Roomed by Dany RAMIREZ MA     Surgical Information:  Surgery/Procedure:Hip replacement   Surgery Location: [facility]  Surgeon: Dr. Mcgee   Surgery Date: 06/28/2023  Time of Surgery: unknown  Where patient plans to recover: At home with family  Fax number for surgical facility: [phone]    Assessment & Plan     The proposed surgical procedure is considered INTERMEDIATE risk.    Preop general physical exam  Patient presents for per-opt clearance for a total hip surgery. She has had joint procedures in the past. She has no questions or concerns. Lab work today is WNL. Discussed medication hold times and my recommendations. She was told to continue phentermine by her weight management provider, but based on the recommendations today, which is to hold, she will reach out and clarify how to take per the weight management team and surgeon. No findings of concern on exam outside those discussed below (see below on heart murmur on ascultation).   - EKG 12-lead, tracing only  - CBC with platelets  - Comprehensive metabolic panel (BMP + Alb, Alk Phos, ALT, AST, Total. Bili, TP)    Heart murmur  Grade 2/6 heart murmur appreciated on exam. Spencer best at the left sternal boarder. Patient reports no SOB, chest pain, peripheral edema, orthopnea, or heart palpitations. No dizziness or syncope. Asymptomatic. Offered echocardiogram prior to OR, but patient declines at this time. Since it is asymptomatic no further work-up needed at this time. Discussed s/s of cardiac disease with patient. She verbalized understanding and if any  symptoms develop she will reach out.     Screening for hyperlipidemia  Patient due for lipid screening. Discussed. She agrees. Ordered.   - Lipid Profile (Chol, Trig, HDL, LDL calc)    Recent Labs   Lab Test 06/08/23  1501   CHOL 206*   HDL 45*   *   TRIG 139     The 10-year ASCVD risk score (Tiffany SANTILLAN, et al., 2019) is: 5.6%    Values used to calculate the score:      Age: 62 years      Sex: Female      Is Non- : No      Diabetic: No      Tobacco smoker: No      Systolic Blood Pressure: 128 mmHg      Is BP treated: No      HDL Cholesterol: 45 mg/dL      Total Cholesterol: 206 mg/dL    Skin tag  Patient has one skin tag to her right buttock and another to her left side of her neck she would like removed. On exam, surrounding redness is observed and she reports irritation with clothing/jewlery. See procedure note below. Consent obtained prior.  Removed without complication. Did discuss that they skin tag could return and grow back. Wound care instructions given. Follow up as needed.  - lidocaine 1% with EPINEPHrine 1:100,000 injection 1 mL    Procedure Note:     Discussed with Patricia regarding technique, risk of infection, and scarring. Betadine is used for cleansing. Lidocaine with epinephrine is used for anesthesia to the skin tag to the right buttock. No anesthesia was used for the small skin tag on her neck. Skin tags removed by sterile scissors. Bandage applied. Patricia tolerated procedure well. No complications.     - No identified additional risk factors other than previously addressed    Antiplatelet or Anticoagulation Medication Instructions:   - aspirin: Discontinue aspirin 7-10 days prior to procedure to reduce bleeding risk. It should be resumed postoperatively.     Additional Medication Instructions:   - Antiepileptics: Continue without modification.   - phentermine: HOLD 7 days prior to surgery.   - Herbal medications and vitamins: HOLD 14 days prior to  surgery.    RECOMMENDATION:  APPROVAL GIVEN to proceed with proposed procedure, without further diagnostic evaluation.    Subjective       HPI related to upcoming procedure: Having  Hip pain, has seen the orthopedic surgeon, they are going to do a total hip replacement.     She also would like two skin tags removed today. She has 2 skin tag(s) that she would like removed.  They have become irritated by rubbing from clothing/jewlery. There is a large skin tag on her right buttock that is catching and pulling on clothing. There is a smaller skin tag on her neck that she catches if she wears a necklace and it is painful.         6/5/2023     1:07 PM   Preop Questions   1. Have you ever had a heart attack or stroke? No   2. Have you ever had surgery on your heart or blood vessels, such as a stent placement, a coronary artery bypass, or surgery on an artery in your head, neck, heart, or legs? No   3. Do you have chest pain with activity? No   4. Do you have a history of  heart failure? No   5. Do you currently have a cold, bronchitis or symptoms of other infection? No   6. Do you have a cough, shortness of breath, or wheezing? No   7. Do you or anyone in your family have previous history of blood clots? No   8. Do you or does anyone in your family have a serious bleeding problem such as prolonged bleeding following surgeries or cuts? No   9. Have you ever had problems with anemia or been told to take iron pills? No   10. Have you had any abnormal blood loss such as black, tarry or bloody stools, or abnormal vaginal bleeding? No   11. Have you ever had a blood transfusion? No   12. Are you willing to have a blood transfusion if it is medically needed before, during, or after your surgery? Yes   13. Have you or any of your relatives ever had problems with anesthesia? No   14. Do you have sleep apnea, excessive snoring or daytime drowsiness? No   15. Do you have any artifical heart valves or other implanted medical devices  like a pacemaker, defibrillator, or continuous glucose monitor? No   16. Do you have artificial joints? YES - Left hip is done   17. Are you allergic to latex? No       Health Care Directive:  Patient does not have a Health Care Directive or Living Will: Discussed advance care planning with patient; however, patient declined at this time.    Preoperative Review of :   reviewed - controlled substances reflected in medication list.      Review of Systems  CONSTITUTIONAL: NEGATIVE for fever, chills, change in weight  INTEGUMENTARY/SKIN: NEGATIVE for worrisome rashes, moles or lesions  EYES: NEGATIVE for vision changes or irritation  ENT/MOUTH: NEGATIVE for ear, mouth and throat problems  RESP: NEGATIVE for significant cough or SOB  CV: NEGATIVE for chest pain, palpitations or peripheral edema  GI: NEGATIVE for nausea, abdominal pain, heartburn, or change in bowel habits  : NEGATIVE for frequency, dysuria, or hematuria  MUSCULOSKELETAL: NEGATIVE for significant arthralgias or myalgia outside of hip pain that is being corrected with surgery  NEURO: NEGATIVE for weakness, dizziness or paresthesias  ENDOCRINE: NEGATIVE for temperature intolerance, skin/hair changes  SKIN: No rashes or lesions of concern. She does have a skin tag on her right buttock that has been catching and bothersome. She has another on her neck. Both are erythemic.   HEME: NEGATIVE for bleeding problems  PSYCHIATRIC: NEGATIVE for changes in mood or affect    Patient Active Problem List    Diagnosis Date Noted     Gastroesophageal reflux disease without esophagitis 10/06/2022     Priority: Medium     Dyslipidemia 10/06/2022     Priority: Medium     Morbid obesity (H) 10/06/2022     Priority: Medium      Past Medical History:   Diagnosis Date     Dyslipidemia      Gastroesophageal reflux disease without esophagitis      Osteoarthritis     knees and hips needing replacement     Past Surgical History:   Procedure Laterality Date     ARTHROPLASTY  HIP Left      CHOLECYSTECTOMY       HEEL SPUR SURGERY Right      LASIK       WISDOM TOOTH EXTRACTION       Current Outpatient Medications   Medication Sig Dispense Refill     Aspirin 81 MG CAPS        calcium carbonate (OS-RAJANI) 600 MG tablet Take by mouth 2 times daily (with meals)       cane Liz [CANE LIZ] Wide Base Quad Cane for home use. For 12 weeks.       cholecalciferol (VITAMIN D3) 125 mcg (5000 units) capsule Take by mouth daily       cyanocobalamin (VITAMIN B-12) 1000 MCG SUBL sublingual tablet Place 1 tablet (1,000 mcg) under the tongue daily 90 tablet 3     Garlic 1000 MG CAPS        liraglutide - Weight Management (SAXENDA) 18 MG/3ML pen Inject 0.6 mg Subcutaneous daily for 7 days, THEN 1.2 mg daily for 7 days, THEN 1.8 mg daily for 7 days, THEN 2.4 mg daily for 7 days, THEN 3 mg daily. (Patient not taking: Reported on 1/6/2023) 45 mL 3     Omega-3 Fatty Acids (FISH OIL) 1360 MG CAPS        omeprazole (PRILOSEC) 20 MG capsule [OMEPRAZOLE (PRILOSEC) 20 MG CAPSULE] Take 20 mg by mouth.       topiramate (TOPAMAX) 25 MG tablet Take 1 tablet (25 mg) by mouth 2 times daily 180 tablet 3     UNABLE TO FIND MEDICATION NAME: Move Free Triple Action - joints       vitamin C (ASCORBIC ACID) 1000 MG TABS Take 1,000 mg by mouth daily         Allergies   Allergen Reactions     Oxycodone-Acetaminophen Itching        Social History     Tobacco Use     Smoking status: Never     Smokeless tobacco: Never   Vaping Use     Vaping status: Not on file   Substance Use Topics     Alcohol use: Yes     Comment: special occasions only     Family History   Problem Relation Age of Onset     Cancer Mother      Multiple myeloma Mother      Cancer Father      Obesity Father      Lung Cancer Father      Osteoarthritis Sister      Osteoarthritis Sister      Osteoarthritis Brother      History   Drug Use Unknown         Objective     /70 (BP Location: Right arm, Patient Position: Sitting, Cuff Size: Adult Regular)   Pulse 88    "Temp 98.4  F (36.9  C) (Oral)   Resp 18   Ht 1.626 m (5' 4\")   Wt 105.5 kg (232 lb 9.6 oz)   LMP  (LMP Unknown)   BMI 39.93 kg/m      Physical Exam    GENERAL APPEARANCE: healthy, alert and no distress     EYES: EOMI, PERRL     HENT: ear canals and TM's normal and nose and mouth without ulcers or lesions     NECK: no adenopathy, no asymmetry, masses, or scars and thyroid normal to palpation     RESP: lungs clear to auscultation - no rales, rhonchi or wheezes     CV: regular rates and rhythm, normal S1 S2, no S3 or S4. 2/6 murmur auscultated. No click or rub. No peripheral edema.      ABDOMEN:  soft, nontender, no HSM or masses and bowel sounds normal     MS: extremities normal- no gross deformities noted, no evidence of inflammation in joints, FROM in all extremities.     SKIN: no suspicious lesions or rashes. Skin tag present to right buttock, base is about 0.5 cm in length and tag is about the size of a dime. Tiny skin tag present on left side of neck.      NEURO: Normal strength and tone, sensory exam grossly normal, mentation intact and speech normal     PSYCH: mentation appears normal. and affect normal/bright     LYMPHATICS: No cervical adenopathy      Diagnostics:  Recent Results (from the past 720 hour(s))   EKG 12-lead, tracing only    Collection Time: 06/08/23  2:27 PM   Result Value Ref Range    Systolic Blood Pressure  mmHg    Diastolic Blood Pressure  mmHg    Ventricular Rate 82 BPM    Atrial Rate 82 BPM    SD Interval 180 ms    QRS Duration 86 ms     ms    QTc 422 ms    P Axis 58 degrees    R AXIS 10 degrees    T Axis 45 degrees    Interpretation ECG       Sinus rhythm  Normal ECG  No previous ECGs available  Confirmed by GAGAN CRUZ MD LOC:[facility] ([id number]) on 6/8/2023 3:48:44 PM     CBC with platelets    Collection Time: 06/08/23  3:01 PM   Result Value Ref Range    WBC Count 8.0 4.0 - 11.0 10e3/uL    RBC Count 4.80 3.80 - 5.20 10e6/uL    Hemoglobin 14.7 11.7 - 15.7 g/dL    Hematocrit 43.3 35.0 - " 47.0 %    MCV 90 78 - 100 fL    MCH 30.6 26.5 - 33.0 pg    MCHC 33.9 31.5 - 36.5 g/dL    RDW 12.2 10.0 - 15.0 %    Platelet Count 312 150 - 450 10e3/uL   Comprehensive metabolic panel (BMP + Alb, Alk Phos, ALT, AST, Total. Bili, TP)    Collection Time: 06/08/23  3:01 PM   Result Value Ref Range    Sodium 139 136 - 145 mmol/L    Potassium 4.4 3.4 - 5.3 mmol/L    Chloride 103 98 - 107 mmol/L    Carbon Dioxide (CO2) 24 22 - 29 mmol/L    Anion Gap 12 7 - 15 mmol/L    Urea Nitrogen 19.4 8.0 - 23.0 mg/dL    Creatinine 0.77 0.51 - 0.95 mg/dL    Calcium 9.3 8.8 - 10.2 mg/dL    Glucose 101 (H) 70 - 99 mg/dL    Alkaline Phosphatase 128 (H) 35 - 104 U/L    AST 20 10 - 35 U/L    ALT 17 10 - 35 U/L    Protein Total 7.5 6.4 - 8.3 g/dL    Albumin 4.5 3.5 - 5.2 g/dL    Bilirubin Total 0.4 <=1.2 mg/dL    GFR Estimate 86 >60 mL/min/1.73m2   Lipid Profile (Chol, Trig, HDL, LDL calc)    Collection Time: 06/08/23  3:01 PM   Result Value Ref Range    Cholesterol 206 (H) <200 mg/dL    Triglycerides 139 <150 mg/dL    Direct Measure HDL 45 (L) >=50 mg/dL    LDL Cholesterol Calculated 133 (H) <=100 mg/dL    Non HDL Cholesterol 161 (H) <130 mg/dL      EKG: appears normal, NSR, normal axis, normal intervals, no acute ST/T changes c/w ischemia, no LVH by voltage criteria, unchanged from previous tracings    Revised Cardiac Risk Index (RCRI):  The patient has the following serious cardiovascular risks for perioperative complications:   - No serious cardiac risks = 0 points     RCRI Interpretation: 0 points: Class I (very low risk - 0.4% complication rate)    Signed Electronically by: SHEFALI Barragan CNP  Copy of this evaluation report is provided to requesting physician.

## 2023-06-21 NOTE — PROGRESS NOTES
Planning to discharge home on POD 1 in the morning with her  helping her.       06/21/23 1612   Discharge Planning   Patient/Family Anticipates Transition to home with family   Concerns to be Addressed no discharge needs identified   Living Arrangements   People in Home spouse   Type of Residence Private Residence   Is your private residence a single family home or apartment? Single family home   Number of Stairs, Within Home, Primary seven   Stair Railings, Within Home, Primary railings safe and in good condition   Once home, are you able to live on one level? No   Which rooms are not on the main level? Bathroom   Bathroom Shower/Tub Tub/Shower unit   Equipment Currently Used at Home grab bar, tub/shower;raised toilet seat;shower chair;cane, straight;walker, standard   Support System   Support Systems Spouse/Significant Other  (, Isauro)   Do you have someone available to stay with you one or two nights once you are home? Yes   Education   Patient attended total joint pre-op class/received pre-op teaching  email/phone call

## 2023-06-28 ENCOUNTER — APPOINTMENT (OUTPATIENT)
Dept: RADIOLOGY | Facility: CLINIC | Age: 63
End: 2023-06-28
Attending: ORTHOPAEDIC SURGERY
Payer: COMMERCIAL

## 2023-06-28 ENCOUNTER — APPOINTMENT (OUTPATIENT)
Dept: PHYSICAL THERAPY | Facility: CLINIC | Age: 63
End: 2023-06-28
Attending: ORTHOPAEDIC SURGERY
Payer: COMMERCIAL

## 2023-06-28 ENCOUNTER — ANESTHESIA EVENT (OUTPATIENT)
Dept: SURGERY | Facility: CLINIC | Age: 63
End: 2023-06-28
Payer: COMMERCIAL

## 2023-06-28 ENCOUNTER — HOSPITAL ENCOUNTER (OUTPATIENT)
Facility: CLINIC | Age: 63
Discharge: HOME | End: 2023-06-29
Attending: ORTHOPAEDIC SURGERY | Admitting: ORTHOPAEDIC SURGERY
Payer: COMMERCIAL

## 2023-06-28 ENCOUNTER — ANESTHESIA (OUTPATIENT)
Dept: SURGERY | Facility: CLINIC | Age: 63
End: 2023-06-28
Payer: COMMERCIAL

## 2023-06-28 DIAGNOSIS — M16.11 PRIMARY OSTEOARTHRITIS OF RIGHT HIP: Primary | ICD-10-CM

## 2023-06-28 PROBLEM — Z96.649 S/P TOTAL HIP ARTHROPLASTY: Status: ACTIVE | Noted: 2023-06-28

## 2023-06-28 PROCEDURE — 250N000011 HC RX IP 250 OP 636: Mod: JZ | Performed by: ANESTHESIOLOGY

## 2023-06-28 PROCEDURE — 250N000009 HC RX 250: Performed by: PHYSICIAN ASSISTANT

## 2023-06-28 PROCEDURE — 999N000182 XR SURGERY CARM FLUORO GREATER THAN 5 MIN: Mod: TC

## 2023-06-28 PROCEDURE — 258N000003 HC RX IP 258 OP 636: Performed by: ORTHOPAEDIC SURGERY

## 2023-06-28 PROCEDURE — C1713 ANCHOR/SCREW BN/BN,TIS/BN: HCPCS | Performed by: ORTHOPAEDIC SURGERY

## 2023-06-28 PROCEDURE — 370N000017 HC ANESTHESIA TECHNICAL FEE, PER MIN: Performed by: ORTHOPAEDIC SURGERY

## 2023-06-28 PROCEDURE — 250N000011 HC RX IP 250 OP 636: Performed by: ANESTHESIOLOGY

## 2023-06-28 PROCEDURE — 360N000084 HC SURGERY LEVEL 4 W/ FLUORO, PER MIN: Performed by: ORTHOPAEDIC SURGERY

## 2023-06-28 PROCEDURE — 250N000013 HC RX MED GY IP 250 OP 250 PS 637: Performed by: PHYSICIAN ASSISTANT

## 2023-06-28 PROCEDURE — 272N000001 HC OR GENERAL SUPPLY STERILE: Performed by: ORTHOPAEDIC SURGERY

## 2023-06-28 PROCEDURE — 250N000013 HC RX MED GY IP 250 OP 250 PS 637: Performed by: ORTHOPAEDIC SURGERY

## 2023-06-28 PROCEDURE — 250N000011 HC RX IP 250 OP 636: Mod: JZ | Performed by: ORTHOPAEDIC SURGERY

## 2023-06-28 PROCEDURE — 97162 PT EVAL MOD COMPLEX 30 MIN: CPT | Mod: GP

## 2023-06-28 PROCEDURE — 97110 THERAPEUTIC EXERCISES: CPT | Mod: GP

## 2023-06-28 PROCEDURE — 710N000010 HC RECOVERY PHASE 1, LEVEL 2, PER MIN: Performed by: ORTHOPAEDIC SURGERY

## 2023-06-28 PROCEDURE — 258N000003 HC RX IP 258 OP 636: Performed by: ANESTHESIOLOGY

## 2023-06-28 PROCEDURE — 250N000011 HC RX IP 250 OP 636: Performed by: PHYSICIAN ASSISTANT

## 2023-06-28 PROCEDURE — C1776 JOINT DEVICE (IMPLANTABLE): HCPCS | Performed by: ORTHOPAEDIC SURGERY

## 2023-06-28 PROCEDURE — 999N000141 HC STATISTIC PRE-PROCEDURE NURSING ASSESSMENT: Performed by: ORTHOPAEDIC SURGERY

## 2023-06-28 PROCEDURE — 250N000011 HC RX IP 250 OP 636: Mod: JZ | Performed by: NURSE ANESTHETIST, CERTIFIED REGISTERED

## 2023-06-28 PROCEDURE — 250N000009 HC RX 250: Performed by: NURSE ANESTHETIST, CERTIFIED REGISTERED

## 2023-06-28 DEVICE — PINNACLE HIP SOLUTIONS ALTRX POLYETHYLENE ACETABULAR LINER +4 NEUTRAL 36MM ID 52MM OD
Type: IMPLANTABLE DEVICE | Site: HIP | Status: FUNCTIONAL
Brand: PINNACLE ALTRX

## 2023-06-28 DEVICE — ACTIS DUOFIX HIP PROSTHESIS (FEMORAL STEM 12/14 TAPER CEMENTLESS SIZE 10 STD COLLAR)  CE
Type: IMPLANTABLE DEVICE | Site: HIP | Status: FUNCTIONAL
Brand: ACTIS

## 2023-06-28 DEVICE — PINNACLE GRIPTION ACETABULAR SHELL SECTOR 52MM OD
Type: IMPLANTABLE DEVICE | Site: HIP | Status: FUNCTIONAL
Brand: PINNACLE GRIPTION

## 2023-06-28 DEVICE — PINNACLE CANCELLOUS BONE SCREW 6.5MM X 30MM
Type: IMPLANTABLE DEVICE | Site: HIP | Status: FUNCTIONAL
Brand: PINNACLE

## 2023-06-28 DEVICE — BIOLOX DELTA CERAMIC FEMORAL HEAD +1.5 36MM DIA 12/14 TAPER
Type: IMPLANTABLE DEVICE | Site: HIP | Status: FUNCTIONAL
Brand: BIOLOX DELTA

## 2023-06-28 RX ORDER — CEFAZOLIN SODIUM/WATER 2 G/20 ML
2 SYRINGE (ML) INTRAVENOUS
Status: COMPLETED | OUTPATIENT
Start: 2023-06-28 | End: 2023-06-28

## 2023-06-28 RX ORDER — HYDROMORPHONE HCL IN WATER/PF 6 MG/30 ML
0.4 PATIENT CONTROLLED ANALGESIA SYRINGE INTRAVENOUS EVERY 5 MIN PRN
Status: DISCONTINUED | OUTPATIENT
Start: 2023-06-28 | End: 2023-06-28 | Stop reason: HOSPADM

## 2023-06-28 RX ORDER — ACETAMINOPHEN 325 MG/1
975 TABLET ORAL EVERY 8 HOURS
Status: DISCONTINUED | OUTPATIENT
Start: 2023-06-28 | End: 2023-06-29 | Stop reason: HOSPADM

## 2023-06-28 RX ORDER — HYDROMORPHONE HCL IN WATER/PF 6 MG/30 ML
0.4 PATIENT CONTROLLED ANALGESIA SYRINGE INTRAVENOUS
Status: DISCONTINUED | OUTPATIENT
Start: 2023-06-28 | End: 2023-06-29 | Stop reason: HOSPADM

## 2023-06-28 RX ORDER — DEXAMETHASONE SODIUM PHOSPHATE 10 MG/ML
INJECTION, SOLUTION INTRAMUSCULAR; INTRAVENOUS PRN
Status: DISCONTINUED | OUTPATIENT
Start: 2023-06-28 | End: 2023-06-28

## 2023-06-28 RX ORDER — BISACODYL 10 MG
10 SUPPOSITORY, RECTAL RECTAL DAILY PRN
Status: DISCONTINUED | OUTPATIENT
Start: 2023-06-28 | End: 2023-06-29 | Stop reason: HOSPADM

## 2023-06-28 RX ORDER — BUPIVACAINE HYDROCHLORIDE 7.5 MG/ML
INJECTION, SOLUTION INTRASPINAL
Status: COMPLETED | OUTPATIENT
Start: 2023-06-28 | End: 2023-06-28

## 2023-06-28 RX ORDER — KETOROLAC TROMETHAMINE 15 MG/ML
15 INJECTION, SOLUTION INTRAMUSCULAR; INTRAVENOUS EVERY 6 HOURS
Status: DISCONTINUED | OUTPATIENT
Start: 2023-06-28 | End: 2023-06-29 | Stop reason: HOSPADM

## 2023-06-28 RX ORDER — LIDOCAINE 40 MG/G
CREAM TOPICAL
Status: DISCONTINUED | OUTPATIENT
Start: 2023-06-28 | End: 2023-06-29 | Stop reason: HOSPADM

## 2023-06-28 RX ORDER — ONDANSETRON 4 MG/1
4 TABLET, ORALLY DISINTEGRATING ORAL EVERY 30 MIN PRN
Status: DISCONTINUED | OUTPATIENT
Start: 2023-06-28 | End: 2023-06-28 | Stop reason: HOSPADM

## 2023-06-28 RX ORDER — LIDOCAINE HYDROCHLORIDE 10 MG/ML
INJECTION, SOLUTION INFILTRATION; PERINEURAL PRN
Status: DISCONTINUED | OUTPATIENT
Start: 2023-06-28 | End: 2023-06-28

## 2023-06-28 RX ORDER — CEFAZOLIN SODIUM 2 G/100ML
2 INJECTION, SOLUTION INTRAVENOUS EVERY 8 HOURS
Status: COMPLETED | OUTPATIENT
Start: 2023-06-28 | End: 2023-06-29

## 2023-06-28 RX ORDER — TOPIRAMATE 25 MG/1
25 TABLET, FILM COATED ORAL 2 TIMES DAILY
Status: DISCONTINUED | OUTPATIENT
Start: 2023-06-28 | End: 2023-06-29 | Stop reason: HOSPADM

## 2023-06-28 RX ORDER — LIDOCAINE 40 MG/G
CREAM TOPICAL
Status: DISCONTINUED | OUTPATIENT
Start: 2023-06-28 | End: 2023-06-28

## 2023-06-28 RX ORDER — HYDROMORPHONE HYDROCHLORIDE 4 MG/1
4 TABLET ORAL EVERY 4 HOURS PRN
Status: DISCONTINUED | OUTPATIENT
Start: 2023-06-28 | End: 2023-06-29 | Stop reason: HOSPADM

## 2023-06-28 RX ORDER — PHENTERMINE HYDROCHLORIDE 37.5 MG/1
37.5 TABLET ORAL
Status: DISCONTINUED | OUTPATIENT
Start: 2023-06-29 | End: 2023-06-29 | Stop reason: HOSPADM

## 2023-06-28 RX ORDER — PROCHLORPERAZINE MALEATE 10 MG
10 TABLET ORAL EVERY 6 HOURS PRN
Status: DISCONTINUED | OUTPATIENT
Start: 2023-06-28 | End: 2023-06-29 | Stop reason: HOSPADM

## 2023-06-28 RX ORDER — ASPIRIN 81 MG/1
81 TABLET ORAL 2 TIMES DAILY
Status: DISCONTINUED | OUTPATIENT
Start: 2023-06-28 | End: 2023-06-29 | Stop reason: HOSPADM

## 2023-06-28 RX ORDER — NALOXONE HYDROCHLORIDE 0.4 MG/ML
0.4 INJECTION, SOLUTION INTRAMUSCULAR; INTRAVENOUS; SUBCUTANEOUS
Status: DISCONTINUED | OUTPATIENT
Start: 2023-06-28 | End: 2023-06-29 | Stop reason: HOSPADM

## 2023-06-28 RX ORDER — PROPOFOL 10 MG/ML
INJECTION, EMULSION INTRAVENOUS CONTINUOUS PRN
Status: DISCONTINUED | OUTPATIENT
Start: 2023-06-28 | End: 2023-06-28

## 2023-06-28 RX ORDER — FENTANYL CITRATE 50 UG/ML
50 INJECTION, SOLUTION INTRAMUSCULAR; INTRAVENOUS EVERY 5 MIN PRN
Status: DISCONTINUED | OUTPATIENT
Start: 2023-06-28 | End: 2023-06-28 | Stop reason: HOSPADM

## 2023-06-28 RX ORDER — ACETAMINOPHEN 325 MG/1
650 TABLET ORAL EVERY 4 HOURS PRN
Status: DISCONTINUED | OUTPATIENT
Start: 2023-07-01 | End: 2023-06-29 | Stop reason: HOSPADM

## 2023-06-28 RX ORDER — PANTOPRAZOLE SODIUM 40 MG/1
40 TABLET, DELAYED RELEASE ORAL
Status: DISCONTINUED | OUTPATIENT
Start: 2023-06-29 | End: 2023-06-29 | Stop reason: HOSPADM

## 2023-06-28 RX ORDER — SODIUM CHLORIDE, SODIUM LACTATE, POTASSIUM CHLORIDE, CALCIUM CHLORIDE 600; 310; 30; 20 MG/100ML; MG/100ML; MG/100ML; MG/100ML
INJECTION, SOLUTION INTRAVENOUS CONTINUOUS
Status: DISCONTINUED | OUTPATIENT
Start: 2023-06-28 | End: 2023-06-28 | Stop reason: HOSPADM

## 2023-06-28 RX ORDER — CEFAZOLIN SODIUM/WATER 2 G/20 ML
2 SYRINGE (ML) INTRAVENOUS SEE ADMIN INSTRUCTIONS
Status: DISCONTINUED | OUTPATIENT
Start: 2023-06-28 | End: 2023-06-28

## 2023-06-28 RX ORDER — NALOXONE HYDROCHLORIDE 0.4 MG/ML
0.2 INJECTION, SOLUTION INTRAMUSCULAR; INTRAVENOUS; SUBCUTANEOUS
Status: DISCONTINUED | OUTPATIENT
Start: 2023-06-28 | End: 2023-06-29 | Stop reason: HOSPADM

## 2023-06-28 RX ORDER — ONDANSETRON 2 MG/ML
4 INJECTION INTRAMUSCULAR; INTRAVENOUS EVERY 6 HOURS PRN
Status: DISCONTINUED | OUTPATIENT
Start: 2023-06-28 | End: 2023-06-29 | Stop reason: HOSPADM

## 2023-06-28 RX ORDER — FENTANYL CITRATE 50 UG/ML
25-100 INJECTION, SOLUTION INTRAMUSCULAR; INTRAVENOUS
Status: DISCONTINUED | OUTPATIENT
Start: 2023-06-28 | End: 2023-06-28

## 2023-06-28 RX ORDER — ONDANSETRON 2 MG/ML
INJECTION INTRAMUSCULAR; INTRAVENOUS PRN
Status: DISCONTINUED | OUTPATIENT
Start: 2023-06-28 | End: 2023-06-28

## 2023-06-28 RX ORDER — SCOLOPAMINE TRANSDERMAL SYSTEM 1 MG/1
1 PATCH, EXTENDED RELEASE TRANSDERMAL ONCE
Status: DISCONTINUED | OUTPATIENT
Start: 2023-06-28 | End: 2023-06-29 | Stop reason: HOSPADM

## 2023-06-28 RX ORDER — MAGNESIUM SULFATE 4 G/50ML
4 INJECTION INTRAVENOUS ONCE
Status: COMPLETED | OUTPATIENT
Start: 2023-06-28 | End: 2023-06-28

## 2023-06-28 RX ORDER — POLYETHYLENE GLYCOL 3350 17 G/17G
17 POWDER, FOR SOLUTION ORAL DAILY
Status: DISCONTINUED | OUTPATIENT
Start: 2023-06-29 | End: 2023-06-29 | Stop reason: HOSPADM

## 2023-06-28 RX ORDER — ONDANSETRON 2 MG/ML
4 INJECTION INTRAMUSCULAR; INTRAVENOUS EVERY 30 MIN PRN
Status: DISCONTINUED | OUTPATIENT
Start: 2023-06-28 | End: 2023-06-28 | Stop reason: HOSPADM

## 2023-06-28 RX ORDER — AMOXICILLIN 250 MG
1 CAPSULE ORAL 2 TIMES DAILY
Status: DISCONTINUED | OUTPATIENT
Start: 2023-06-28 | End: 2023-06-29 | Stop reason: HOSPADM

## 2023-06-28 RX ORDER — HYDROMORPHONE HYDROCHLORIDE 2 MG/1
2 TABLET ORAL EVERY 4 HOURS PRN
Status: DISCONTINUED | OUTPATIENT
Start: 2023-06-28 | End: 2023-06-29 | Stop reason: HOSPADM

## 2023-06-28 RX ORDER — HYDROMORPHONE HCL IN WATER/PF 6 MG/30 ML
0.2 PATIENT CONTROLLED ANALGESIA SYRINGE INTRAVENOUS EVERY 5 MIN PRN
Status: DISCONTINUED | OUTPATIENT
Start: 2023-06-28 | End: 2023-06-28 | Stop reason: HOSPADM

## 2023-06-28 RX ORDER — SODIUM CHLORIDE, SODIUM LACTATE, POTASSIUM CHLORIDE, CALCIUM CHLORIDE 600; 310; 30; 20 MG/100ML; MG/100ML; MG/100ML; MG/100ML
INJECTION, SOLUTION INTRAVENOUS CONTINUOUS
Status: DISCONTINUED | OUTPATIENT
Start: 2023-06-28 | End: 2023-06-28

## 2023-06-28 RX ORDER — FENTANYL CITRATE 50 UG/ML
25 INJECTION, SOLUTION INTRAMUSCULAR; INTRAVENOUS EVERY 5 MIN PRN
Status: DISCONTINUED | OUTPATIENT
Start: 2023-06-28 | End: 2023-06-28 | Stop reason: HOSPADM

## 2023-06-28 RX ORDER — ONDANSETRON 4 MG/1
4 TABLET, ORALLY DISINTEGRATING ORAL EVERY 6 HOURS PRN
Status: DISCONTINUED | OUTPATIENT
Start: 2023-06-28 | End: 2023-06-29 | Stop reason: HOSPADM

## 2023-06-28 RX ORDER — HYDROMORPHONE HCL IN WATER/PF 6 MG/30 ML
0.2 PATIENT CONTROLLED ANALGESIA SYRINGE INTRAVENOUS
Status: DISCONTINUED | OUTPATIENT
Start: 2023-06-28 | End: 2023-06-29 | Stop reason: HOSPADM

## 2023-06-28 RX ORDER — TRANEXAMIC ACID 650 MG/1
1950 TABLET ORAL ONCE
Status: COMPLETED | OUTPATIENT
Start: 2023-06-28 | End: 2023-06-28

## 2023-06-28 RX ORDER — SODIUM CHLORIDE, SODIUM LACTATE, POTASSIUM CHLORIDE, CALCIUM CHLORIDE 600; 310; 30; 20 MG/100ML; MG/100ML; MG/100ML; MG/100ML
INJECTION, SOLUTION INTRAVENOUS CONTINUOUS
Status: DISCONTINUED | OUTPATIENT
Start: 2023-06-28 | End: 2023-06-29 | Stop reason: HOSPADM

## 2023-06-28 RX ADMIN — SODIUM CHLORIDE, POTASSIUM CHLORIDE, SODIUM LACTATE AND CALCIUM CHLORIDE: 600; 310; 30; 20 INJECTION, SOLUTION INTRAVENOUS at 16:54

## 2023-06-28 RX ADMIN — TOPIRAMATE 25 MG: 50 TABLET ORAL at 20:33

## 2023-06-28 RX ADMIN — HYDROMORPHONE HYDROCHLORIDE 4 MG: 4 TABLET ORAL at 15:05

## 2023-06-28 RX ADMIN — BUPIVACAINE HYDROCHLORIDE IN DEXTROSE 1.6 ML: 7.5 INJECTION, SOLUTION SUBARACHNOID at 11:17

## 2023-06-28 RX ADMIN — Medication 2 G: at 11:15

## 2023-06-28 RX ADMIN — TRANEXAMIC ACID 1950 MG: 650 TABLET ORAL at 08:34

## 2023-06-28 RX ADMIN — SODIUM CHLORIDE, POTASSIUM CHLORIDE, SODIUM LACTATE AND CALCIUM CHLORIDE: 600; 310; 30; 20 INJECTION, SOLUTION INTRAVENOUS at 08:54

## 2023-06-28 RX ADMIN — ACETAMINOPHEN 975 MG: 325 TABLET ORAL at 18:35

## 2023-06-28 RX ADMIN — ASPIRIN 81 MG: 81 TABLET, COATED ORAL at 20:33

## 2023-06-28 RX ADMIN — DEXAMETHASONE SODIUM PHOSPHATE 10 MG: 10 INJECTION, SOLUTION INTRAMUSCULAR; INTRAVENOUS at 11:23

## 2023-06-28 RX ADMIN — MAGNESIUM SULFATE HEPTAHYDRATE 4 G: 4 INJECTION, SOLUTION INTRAVENOUS at 08:54

## 2023-06-28 RX ADMIN — FENTANYL CITRATE 50 MCG: 50 INJECTION, SOLUTION INTRAMUSCULAR; INTRAVENOUS at 11:16

## 2023-06-28 RX ADMIN — PROPOFOL 100 MCG/KG/MIN: 10 INJECTION, EMULSION INTRAVENOUS at 11:23

## 2023-06-28 RX ADMIN — ONDANSETRON 4 MG: 2 INJECTION INTRAMUSCULAR; INTRAVENOUS at 11:23

## 2023-06-28 RX ADMIN — MIDAZOLAM 2 MG: 1 INJECTION INTRAMUSCULAR; INTRAVENOUS at 11:13

## 2023-06-28 RX ADMIN — CEFAZOLIN SODIUM 2 G: 2 INJECTION, SOLUTION INTRAVENOUS at 20:32

## 2023-06-28 RX ADMIN — SODIUM CHLORIDE, POTASSIUM CHLORIDE, SODIUM LACTATE AND CALCIUM CHLORIDE: 600; 310; 30; 20 INJECTION, SOLUTION INTRAVENOUS at 11:57

## 2023-06-28 RX ADMIN — LIDOCAINE HYDROCHLORIDE 1 ML: 10 INJECTION, SOLUTION INFILTRATION; PERINEURAL at 11:23

## 2023-06-28 RX ADMIN — KETOROLAC TROMETHAMINE 15 MG: 15 INJECTION, SOLUTION INTRAMUSCULAR; INTRAVENOUS at 20:34

## 2023-06-28 RX ADMIN — SENNOSIDES AND DOCUSATE SODIUM 1 TABLET: 50; 8.6 TABLET ORAL at 20:33

## 2023-06-28 ASSESSMENT — ACTIVITIES OF DAILY LIVING (ADL)
ADLS_ACUITY_SCORE: 20

## 2023-06-28 NOTE — ANESTHESIA PREPROCEDURE EVALUATION
Anesthesia Pre-Procedure Evaluation    Patient: Patricia Mckeon   MRN: [id number] : 1960        Procedure : Procedure(s):  RIGHT TOTAL HIP ARTHROPLASTY DIRECT ANTERIOR APPROACH          Past Medical History:   Diagnosis Date     Dyslipidemia      Gastroesophageal reflux disease without esophagitis      Heart murmur      Motion sickness      Obese      Osteoarthritis     knees and hips needing replacement     PONV (postoperative nausea and vomiting)       Past Surgical History:   Procedure Laterality Date     ARTHROPLASTY HIP Left      CHOLECYSTECTOMY       HEEL SPUR SURGERY Right      LASIK       WISDOM TOOTH EXTRACTION        Allergies   Allergen Reactions     Oxycodone-Acetaminophen Itching      Social History     Tobacco Use     Smoking status: Never     Passive exposure: Never     Smokeless tobacco: Never   Substance Use Topics     Alcohol use: Yes     Comment: special occasions only      Wt Readings from Last 1 Encounters:   23 105.2 kg (232 lb)        Anesthesia Evaluation            ROS/MED HX  ENT/Pulmonary:  - neg pulmonary ROS     Neurologic:  - neg neurologic ROS     Cardiovascular:  - neg cardiovascular ROS   (+) Dyslipidemia -----valvular problems/murmurs     METS/Exercise Tolerance: >4 METS    Hematologic:  - neg hematologic  ROS     Musculoskeletal:  - neg musculoskeletal ROS     GI/Hepatic:  - neg GI/hepatic ROS   (+) GERD,     Renal/Genitourinary:  - neg Renal ROS     Endo:  - neg endo ROS   (+) Obesity,     Psychiatric/Substance Use:  - neg psychiatric ROS     Infectious Disease:  - neg infectious disease ROS     Malignancy:  - neg malignancy ROS     Other:  - neg other ROS          Physical Exam    Airway  airway exam normal      Mallampati: II   TM distance: > 3 FB   Neck ROM: full   Mouth opening: > 3 cm    Respiratory Devices and Support         Dental           Cardiovascular   cardiovascular exam normal       Rhythm and rate: regular     Pulmonary   pulmonary exam normal         breath sounds clear to auscultation           OUTSIDE LABS:  CBC:   Lab Results   Component Value Date    WBC 8.0 06/08/2023    WBC 6.5 10/06/2022    HGB 14.7 06/08/2023    HGB 15.3 10/06/2022    HCT 43.3 06/08/2023    HCT 46.2 10/06/2022     06/08/2023     10/06/2022     BMP:   Lab Results   Component Value Date     06/08/2023     10/06/2022    POTASSIUM 4.4 06/08/2023    POTASSIUM 3.9 10/06/2022    CHLORIDE 103 06/08/2023    CHLORIDE 100 10/06/2022    CO2 24 06/08/2023    CO2 27 10/06/2022    BUN 19.4 06/08/2023    BUN 13.6 10/06/2022    CR 0.77 06/08/2023    CR 0.69 10/06/2022     (H) 06/08/2023    GLC 94 10/06/2022     COAGS: No results found for: PTT, INR, FIBR  POC: No results found for: BGM, HCG, HCGS  HEPATIC:   Lab Results   Component Value Date    ALBUMIN 4.5 06/08/2023    PROTTOTAL 7.5 06/08/2023    ALT 17 06/08/2023    AST 20 06/08/2023    ALKPHOS 128 (H) 06/08/2023    BILITOTAL 0.4 06/08/2023     OTHER:   Lab Results   Component Value Date    A1C 5.1 10/06/2022    RAJANI 9.3 06/08/2023    TSH 2.16 10/06/2022       Anesthesia Plan    ASA Status:  2   NPO Status:  NPO Appropriate    Anesthesia Type: Spinal.      Maintenance: TIVA.        Consents    Anesthesia Plan(s) and associated risks, benefits, and realistic alternatives discussed. Questions answered and patient/representative(s) expressed understanding.     - Discussed: Risks, Benefits and Alternatives for BOTH SEDATION and the PROCEDURE were discussed     - Discussed with:  Patient      - Extended Intubation/Ventilatory Support Discussed: No.         Postoperative Care    Pain management: IV analgesics, Oral pain medications, Neuraxial analgesia, Multi-modal analgesia.   PONV prophylaxis: Ondansetron (or other 5HT-3), Dexamethasone or Solumedrol, Scopolamine patch     Comments:                Andry Cuello MD

## 2023-06-28 NOTE — ANESTHESIA PROCEDURE NOTES
"Intrathecal injection Procedure Note    Pre-Procedure   Staff -        Anesthesiologist:  Andry Cuello MD       Performed By: anesthesiologist       Location: OR       Procedure Start/Stop Times: 6/28/2023 11:17 AM and 6/28/2023 11:20 AM       Pre-Anesthestic Checklist: patient identified, IV checked, risks and benefits discussed, informed consent, monitors and equipment checked, pre-op evaluation, at physician/surgeon's request and post-op pain management  Timeout:       Correct Patient: Yes        Correct Procedure: Yes        Correct Site: Yes        Correct Position: Yes   Procedure Documentation  Procedure: intrathecal injection       Patient Position: sitting       Patient Prep/Sterile Barriers: sterile gloves, mask, patient draped       Skin prep: Chloraprep       Insertion Site: L3-4. (midline approach).       Needle Gauge: 24.        Needle Length (Inches): 3.5        Spinal Needle Type: Pencan       Introducer used       Introducer: 20 G       # of attempts: 1 and  # of redirects:  0    Assessment/Narrative         Paresthesias: No.       CSF fluid: clear.       Opening pressure was cmH2O while  Sitting.      Medication(s) Administered   0.75% Hyperbaric Bupivacaine (Intrathecal) - Intrathecal   1.6 mL - 6/28/2023 11:17:00 AM  Medication Administration Time: 6/28/2023 11:17 AM      FOR [facility] ([facility] [place] ONLY:   Pain Team Contact information: please page the Pain Team [address] [place]. Search \"Pain\". During daytime hours, please page the attending first. At night please page the resident first.      "

## 2023-06-28 NOTE — PROGRESS NOTES
06/28/23 1600   Appointment Info   Signing Clinician's Name / Credentials (PT) Mago Kerr PT   Quick Adds   Quick Adds Certification   Living Environment   People in Home spouse   Current Living Arrangements house   Home Accessibility stairs to enter home;stairs within home   Number of Stairs, Main Entrance 2   Stair Railings, Main Entrance none;other (see comments)  (can hold onto walls)   Number of Stairs, Within Home, Primary seven   Stair Railings, Within Home, Primary railings safe and in good condition   Transportation Anticipated family or friend will provide   Self-Care   Equipment Currently Used at Home walker, rolling;cane, straight   Activity/Exercise/Self-Care Comment independent ADL's/IADL's   General Information   Onset of Illness/Injury or Date of Surgery 06/28/23   Referring Physician Dr. Mcgee   Patient/Family Therapy Goals Statement (PT) move/walk without pain   Pertinent History of Current Problem (include personal factors and/or comorbidities that impact the POC) s/p R YAIMA ant. approach 6/26/23; PMH of L YAIMA   Existing Precautions/Restrictions no hip hyperextension;no hip ER   Weight-Bearing Status - RLE weight-bearing as tolerated   Cognition   Affect/Mental Status (Cognition) WFL   Orientation Status (Cognition) oriented x 4   Follows Commands (Cognition) WFL   Range of Motion (ROM)   ROM Comment decreased R hip rom/strength s/p YAIMA   Strength (Manual Muscle Testing)   Strength Comments decreased R hip rom/strength s/p YAIMA   Bed Mobility   Bed Mobility sit-supine   Sit-Supine [place] (Bed Mobility) minimum assist (75% patient effort)   Transfers   Transfers sit-stand transfer   Sit-Stand Transfer   Sit-Stand [place] (Transfers) verbal cues;supervision   Assistive Device (Sit-Stand Transfers) walker, front-wheeled   Gait/Stairs (Locomotion)   [place] Level (Gait) supervision;verbal cues   Assistive Device (Gait) walker, front-wheeled   Distance in Feet 10, 6  (limited by  nausea/vomitting/dizziness)   Pattern (Gait) step-to   Deviations/Abnormal Patterns (Gait) antalgic;gait speed decreased;weight shifting decreased;stride length decreased;florin decreased   Clinical Impression   Criteria for Skilled Therapeutic Intervention Yes, treatment indicated   PT Diagnosis (PT) impaired functional mobility   Influenced by the following impairments decreased rom, strength, balance, activity tolerance   Functional limitations due to impairments bed mobility, transfers, gait, stairs   Clinical Presentation (PT Evaluation Complexity) Stable/Uncomplicated   Clinical Presentation Rationale pt presents as medically diagnosed   Clinical Decision Making (Complexity) moderate complexity   Planned Therapy Interventions (PT) balance training;bed mobility training;gait training;home exercise program;patient/family education;ROM (range of motion);stair training;strengthening;stretching;transfer training   Anticipated Equipment Needs at Discharge (PT) walker, rolling   Risk & Benefits of therapy have been explained evaluation/treatment results reviewed;patient;spouse/significant other   PT Total Evaluation Time   PT Eval, Moderate Complexity Minutes (37169) 20   Plan of Care Review   Plan of Care Reviewed With patient;spouse   Therapy Certification   Start of care date 06/28/23   Certification date from 06/28/23   Certification date to 07/05/23   Medical Diagnosis s/p R YAIMA   Physical Therapy Goals   PT Frequency 2x/day   PT Predicted Duration/Target Date for Goal Attainment 06/29/23   PT Goals Transfers;Gait;Stairs;PT Goal 1   PT: Transfers Modified independent;Sit to/from stand;Assistive device   PT: Gait Modified independent;Rolling walker;50 feet   PT: Stairs Supervision/stand-by assist;7 stairs;Rail on left;Rail on right   PT: Goal 1 Pt will demonstrate YAIMA HEP with handout and supervision   Interventions   Interventions Quick Adds Therapeutic Procedure   Therapeutic Procedure/Exercise   Ther.  Procedure: strength, endurance, ROM, flexibillity Minutes (41441) 15   Symptoms Noted During/After Treatment fatigue;increased pain   Treatment Detail/Skilled Intervention recliner RLE YAIMA ex x5 reps with handout and min assist for SLR and heel slides, sba others; cuing and demonstration for technique and use of belt to assist as needed   PT Discharge Planning   PT Plan gait with rw, 7 steps with rail, review R YAIMA HEP   PT Discharge Recommendation (DC Rec)   (defer to ortho)   PT Rationale for DC Rec limited ambulation due to vomitting/nausea; pt moving well and anticipate will be able to return home with spouse assist and use of walker pending progress with ambulation and stairs   PT Brief overview of current status sba amb. with walker 10 feet   Total Session Time   Timed Code Treatment Minutes 15   Total Session Time (sum of timed and untimed services) 35   [facility]  OUTPATIENT PHYSICAL THERAPY EVALUATION  PLAN OF TREATMENT FOR OUTPATIENT REHABILITATION  (COMPLETE FOR INITIAL CLAIMS ONLY)  Patient's Last Name, First Name, M.I.  YOB: 1960  Patricia Mckeon                        Provider's Name  [facility] Medical Record No.  [id number]                             Onset Date:  06/28/23   Start of Care Date:  06/28/23   Type:     _X_PT   ___OT   ___SLP Medical Diagnosis:  s/p R YAIMA              PT Diagnosis:  impaired functional mobility Visits from SOC:  1     See note for plan of treatment, functional goals and certification details    I CERTIFY THE NEED FOR THESE SERVICES FURNISHED UNDER        THIS PLAN OF TREATMENT AND WHILE UNDER MY CARE     (Physician co-signature of this document indicates review and certification of the therapy plan).

## 2023-06-28 NOTE — INTERVAL H&P NOTE
"I have reviewed the surgical (or preoperative) H&P that is linked to this encounter, and examined the patient. There are no significant changes    Clinical Conditions Present on Arrival:  Clinically Significant Risk Factors Present on Admission                 # Drug Induced Platelet Defect: home medication list includes an antiplatelet medication  # Severe Obesity: Estimated body mass index is 41.1 kg/m  as calculated from the following:    Height as of this encounter: 1.6 m (5' 3\").    Weight as of this encounter: 105.2 kg (232 lb).       "

## 2023-06-28 NOTE — PLAN OF CARE
Goal Outcome Evaluation:      Plan of Care Reviewed With: patient    Overall Patient Progress: improving    Patient vital signs are at baseline: Yes  Patient able to ambulate as they were prior to admission or with assist devices provided by therapies during their stay:  Yes  Patient MUST void prior to discharge:  Yes  Patient able to tolerate oral intake:  Yes  Pain has adequate pain control using Oral analgesics:  Yes  Does patient have an identified :  Yes  Has goal D/C date and time been discussed with patient:  Yes

## 2023-06-28 NOTE — ANESTHESIA POSTPROCEDURE EVALUATION
Patient: Patricia Mckeon    Procedure: Procedure(s):  RIGHT TOTAL HIP ARTHROPLASTY DIRECT ANTERIOR APPROACH       Anesthesia Type:  Spinal    Note:  Disposition: Admission   Postop Pain Control: Uneventful            Sign Out: Well controlled pain   PONV: No   Neuro/Psych: Uneventful            Sign Out: Acceptable/Baseline neuro status   Airway/Respiratory: Uneventful            Sign Out: Acceptable/Baseline resp. status   CV/Hemodynamics: Uneventful            Sign Out: Acceptable CV status; No obvious hypovolemia; No obvious fluid overload   Other NRE: NONE   DID A NON-ROUTINE EVENT OCCUR? No           Last vitals:  Vitals Value Taken Time   /77 06/28/23 1330   Temp 36.2  C (97.2  F) 06/28/23 1320   Pulse 75 06/28/23 1332   Resp 23 06/28/23 1332   SpO2 99 % 06/28/23 1332   Vitals shown include unvalidated device data.    Electronically Signed By: Andry Cuello MD  June 28, 2023  3:02 PM

## 2023-06-28 NOTE — ANESTHESIA CARE TRANSFER NOTE
Patient: Patricia Mckeon    Procedure: Procedure(s):  RIGHT TOTAL HIP ARTHROPLASTY DIRECT ANTERIOR APPROACH       Diagnosis: Osteoarthritis of right hip [M16.11]  Diagnosis Additional Information: No value filed.    Anesthesia Type:   Spinal     Note:    Oropharynx: oropharynx clear of all foreign objects and spontaneously breathing  Level of Consciousness: awake  Oxygen Supplementation: face mask  Level of Supplemental Oxygen (L/min / FiO2): 8  Independent Airway: airway patency satisfactory and stable  Dentition: dentition unchanged  Vital Signs Stable: post-procedure vital signs reviewed and stable  Report to RN Given: handoff report given  Patient transferred to: PACU    Handoff Report: Identifed the Patient, Identified the Reponsible Provider, Reviewed the pertinent medical history, Discussed the surgical course, Reviewed Intra-OP anesthesia mangement and issues during anesthesia, Set expectations for post-procedure period and Allowed opportunity for questions and acknowledgement of understanding      Vitals:  Vitals Value Taken Time   /51 06/28/23 1253   Temp 36.8  C (98.2  F) 06/28/23 1253   Pulse 78 06/28/23 1255   Resp 21 06/28/23 1255   SpO2 100 % 06/28/23 1255   Vitals shown include unvalidated device data.    Electronically Signed By: SHEFALI SHAY CRNA  June 28, 2023  12:56 PM

## 2023-06-28 NOTE — PHARMACY-ADMISSION MEDICATION HISTORY
Pharmacist Admission Medication History    Admission medication history is complete. The information provided in this note is only as accurate as the sources available at the time of the update.    Medication reconciliation/reorder completed by provider prior to medication history? No    Information Source(s): Patient via in-person    Pertinent Information:     Changes made to PTA medication list:    Added: None    Deleted: None    Changed: None    Medication Affordability:       Allergies reviewed with patient and updates made in EHR: yes    Medication History Completed By: Constance Pinedo [facility] 6/28/2023 9:12 AM    Prior to Admission medications    Medication Sig Last Dose Taking? Auth Provider Long Term End Date   Aspirin 81 MG CAPS Take 81 mg by mouth daily 6/21/2023 Yes Reported, Patient     calcium carbonate (OS-RAJANI) 600 MG tablet Take by mouth 2 times daily (with meals) 6/27/2023 Yes Reported, Patient     cholecalciferol (VITAMIN D3) 125 mcg (5000 units) capsule Take 1 capsule by mouth daily 6/27/2023 Yes Reported, Patient     cyanocobalamin (VITAMIN B-12) 1000 MCG SUBL sublingual tablet Place 1 tablet (1,000 mcg) under the tongue daily 6/27/2023 Yes Hetal Cortez MD  10/13/23   Garlic 1000 MG CAPS Take 1 capsule by mouth daily 6/27/2023 Yes Reported, Patient     Omega-3 Fatty Acids (FISH OIL) 1360 MG CAPS Take 1 capsule by mouth daily Past Week Yes Reported, Patient     phentermine (ADIPEX-P) 37.5 MG tablet Take 0.5-1 tablets by mouth every morning (before breakfast) Last dose 6/21/23 before surgery 6/21/2023 Yes Reported, Patient     topiramate (TOPAMAX) 25 MG tablet Take 1 tablet (25 mg) by mouth 2 times daily 6/28/2023 at x1 Yes Hetal Cortez MD Yes 1/6/24   vitamin C (ASCORBIC ACID) 1000 MG TABS Take 1,000 mg by mouth daily 6/27/2023 Yes Reported, Patient     omeprazole (PRILOSEC) 20 MG capsule [OMEPRAZOLE (PRILOSEC) 20 MG CAPSULE] Take 20 mg by mouth.   Provider, Historical

## 2023-06-29 ENCOUNTER — APPOINTMENT (OUTPATIENT)
Dept: PHYSICAL THERAPY | Facility: CLINIC | Age: 63
End: 2023-06-29
Attending: ORTHOPAEDIC SURGERY
Payer: COMMERCIAL

## 2023-06-29 VITALS
DIASTOLIC BLOOD PRESSURE: 60 MMHG | WEIGHT: 232 LBS | RESPIRATION RATE: 20 BRPM | OXYGEN SATURATION: 99 % | BODY MASS INDEX: 41.11 KG/M2 | HEIGHT: 63 IN | SYSTOLIC BLOOD PRESSURE: 136 MMHG | TEMPERATURE: 97.5 F | HEART RATE: 73 BPM

## 2023-06-29 LAB
GLUCOSE BLDC GLUCOMTR-MCNC: 119 MG/DL (ref 70–99)
HGB BLD-MCNC: 12 G/DL (ref 11.7–15.7)

## 2023-06-29 PROCEDURE — 97110 THERAPEUTIC EXERCISES: CPT | Mod: GP

## 2023-06-29 PROCEDURE — 82962 GLUCOSE BLOOD TEST: CPT

## 2023-06-29 PROCEDURE — 250N000011 HC RX IP 250 OP 636: Mod: JZ | Performed by: ORTHOPAEDIC SURGERY

## 2023-06-29 PROCEDURE — 250N000013 HC RX MED GY IP 250 OP 250 PS 637: Performed by: ORTHOPAEDIC SURGERY

## 2023-06-29 PROCEDURE — 85018 HEMOGLOBIN: CPT | Performed by: ORTHOPAEDIC SURGERY

## 2023-06-29 PROCEDURE — 36415 COLL VENOUS BLD VENIPUNCTURE: CPT | Performed by: ORTHOPAEDIC SURGERY

## 2023-06-29 PROCEDURE — 97116 GAIT TRAINING THERAPY: CPT | Mod: GP

## 2023-06-29 RX ORDER — POLYETHYLENE GLYCOL 3350 17 G/17G
17 POWDER, FOR SOLUTION ORAL DAILY
Qty: 510 G | Refills: 0 | Status: SHIPPED | OUTPATIENT
Start: 2023-06-29 | End: 2024-08-15

## 2023-06-29 RX ORDER — ASPIRIN 81 MG/1
81 TABLET ORAL 2 TIMES DAILY
Qty: 84 TABLET | Refills: 0 | Status: SHIPPED | OUTPATIENT
Start: 2023-06-29 | End: 2023-08-10

## 2023-06-29 RX ORDER — HYDROMORPHONE HYDROCHLORIDE 2 MG/1
2-4 TABLET ORAL EVERY 4 HOURS PRN
Qty: 24 TABLET | Refills: 0 | Status: SHIPPED | OUTPATIENT
Start: 2023-06-29 | End: 2023-08-10

## 2023-06-29 RX ADMIN — PANTOPRAZOLE SODIUM 40 MG: 40 TABLET, DELAYED RELEASE ORAL at 07:52

## 2023-06-29 RX ADMIN — HYDROMORPHONE HYDROCHLORIDE 4 MG: 4 TABLET ORAL at 11:08

## 2023-06-29 RX ADMIN — ACETAMINOPHEN 975 MG: 325 TABLET ORAL at 07:53

## 2023-06-29 RX ADMIN — POLYETHYLENE GLYCOL 3350 17 G: 17 POWDER, FOR SOLUTION ORAL at 07:54

## 2023-06-29 RX ADMIN — TOPIRAMATE 25 MG: 50 TABLET ORAL at 07:55

## 2023-06-29 RX ADMIN — KETOROLAC TROMETHAMINE 15 MG: 15 INJECTION, SOLUTION INTRAMUSCULAR; INTRAVENOUS at 05:23

## 2023-06-29 RX ADMIN — HYDROMORPHONE HYDROCHLORIDE 0.2 MG: 0.2 INJECTION, SOLUTION INTRAMUSCULAR; INTRAVENOUS; SUBCUTANEOUS at 00:24

## 2023-06-29 RX ADMIN — ACETAMINOPHEN 975 MG: 325 TABLET ORAL at 00:24

## 2023-06-29 RX ADMIN — ASPIRIN 81 MG: 81 TABLET, COATED ORAL at 07:52

## 2023-06-29 RX ADMIN — SENNOSIDES AND DOCUSATE SODIUM 1 TABLET: 50; 8.6 TABLET ORAL at 07:56

## 2023-06-29 RX ADMIN — CEFAZOLIN SODIUM 2 G: 2 INJECTION, SOLUTION INTRAVENOUS at 05:23

## 2023-06-29 RX ADMIN — KETOROLAC TROMETHAMINE 15 MG: 15 INJECTION, SOLUTION INTRAMUSCULAR; INTRAVENOUS at 00:24

## 2023-06-29 ASSESSMENT — ACTIVITIES OF DAILY LIVING (ADL)
ADLS_ACUITY_SCORE: 20

## 2023-06-29 NOTE — PLAN OF CARE
Patient vital signs are at baseline: Yes  Patient able to ambulate as they were prior to admission or with assist devices provided by therapies during their stay:  Yes  Patient MUST void prior to discharge:  Yes  Patient able to tolerate oral intake:  Yes  Pain has adequate pain control using Oral analgesics:  Yes  Does patient have an identified :  Yes  Has goal D/C date and time been discussed with patient:  Yes Goal Outcome Evaluation:      Plan of Care Reviewed With: patient    Overall Patient Progress: improvingOverall Patient Progress: improving   pt alert and oriented, vss on room air, able to make needs known, ambulated and voided. Pain under control. Potential discharge for today.

## 2023-06-29 NOTE — PLAN OF CARE
Physical Therapy Discharge Summary    Reason for therapy discharge:    All goals and outcomes met, no further needs identified.    Progress towards therapy goal(s). See goals on Care Plan in [facility] electronic health record for goal details.  Goals met    Therapy recommendation(s):    Continue home exercise program.

## 2023-06-29 NOTE — PROGRESS NOTES
Pt is not appropriate for skilled OT services at this time due to Pt not needing OT as she has had OT in the past for her previous YAIMA.  Will defer to PT and Ortho Team for discharge recommendations.  Plan was discussed with PT, Pt. and RN.  Will D/C current OT orders. Thank you.    6/29/2023 by Nadya Serna, OTR, OTR/L

## 2023-06-29 NOTE — PROGRESS NOTES
"[facility]opa[facility] Progress Note      Post-operative Day: 1 Day Post-Op    Procedure(s):  RIGHT TOTAL HIP ARTHROPLASTY DIRECT ANTERIOR APPROACH        Plan: Anticoagulation protocol: Aspirin 81 mg BID  x 42  days            Pain medications:  scopainmedication: dilaudid, toradol and tylenol            Weight bearing status:  WBAT            Disposition:  Home today             Continue cares and rehabilitation     Subjective:  Patricia reports mild headache and backache today described as aching.   Pain: minimal and moderate  Chest pain, SOB:  No  Denies lightheadedness/dizziness  Denies nausea/ vomiting  Passing flatus  voiding well    Objective:  Blood pressure 136/60, pulse 73, temperature 97.5  F (36.4  C), temperature source Oral, resp. rate 20, height 1.6 m (5' 3\"), weight 105.2 kg (232 lb), SpO2 99 %, not currently breastfeeding.    Patient Vitals for the past 24 hrs:   BP Temp Temp src Pulse Resp SpO2   06/29/23 0751 136/60 97.5  F (36.4  C) Oral 73 20 99 %   06/28/23 2350 127/60 97.3  F (36.3  C) Oral 77 16 96 %   06/28/23 2100 (!) 148/70 97.2  F (36.2  C) Oral 81 18 97 %   06/28/23 1700 118/59 97.2  F (36.2  C) Oral 62 20 90 %   06/28/23 1600 (!) 150/76 97.4  F (36.3  C) Oral 85 20 98 %   06/28/23 1500 138/88 97.5  F (36.4  C) Oral 85 20 97 %   06/28/23 1430 (!) 150/70 97.3  F (36.3  C) Oral 66 20 97 %   06/28/23 1400 139/63 97.2  F (36.2  C) Axillary 75 20 96 %   06/28/23 1330 (!) 145/77 -- -- 75 25 99 %   06/28/23 1320 139/70 97.2  F (36.2  C) Temporal 71 22 99 %   06/28/23 1310 (!) 145/67 -- -- 77 24 99 %   06/28/23 1300 116/57 -- -- 72 18 100 %   06/28/23 1253 103/51 98.2  F (36.8  C) Temporal 76 16 100 %       Wt Readings from Last 4 Encounters:   06/28/23 105.2 kg (232 lb)   06/08/23 105.5 kg (232 lb 9.6 oz)   04/07/23 108.4 kg (239 lb)   01/06/23 113.4 kg (250 lb)         Motor function, sensation, and circulation intact   Yes  Wound status: Aquacel is clean, without shadowing, dry, and intact. " Yes  Calf tenderness: Bilateral  No    Pertinent Labs   Lab Results: personally reviewed.     Recent Labs   Lab Test 06/29/23  0605 06/08/23  1501 10/06/22  1113   HGB 12.0 14.7 15.3   HCT  --  43.3 46.2   MCV  --  90 89   PLT  --  312 320   NA  --  139 138       Report completed by:  Quintin Tapia NP  Date: 6/29/2023

## 2023-06-29 NOTE — DISCHARGE SUMMARY
[facility] Discharge Summary                                  [facility]     TIFF STEELE [id number]   Age: 62 year old  PCP: Nikolas Tejada, [phone] 1960     Date of Admission:  6/28/2023  Date of Discharge::  6/29/2023  Discharge Provider:  Quintin Tapia NP    Code status:  Full Code    Admission Information:  Admission Diagnosis:  Osteoarthritis of right hip [M16.11]  S/P total hip arthroplasty [Z96.649]    Post-Operative Day: 1 Day Post-Op     Reason for admission:  The patient was admitted for the following:Procedure(s) (LRB):  RIGHT TOTAL HIP ARTHROPLASTY DIRECT ANTERIOR APPROACH (Right)    Principal Problem:    S/P total hip arthroplasty      Allergies:  Oxycodone-acetaminophen    Following the procedure noted above the patient was transferred to the post-op floor and started on:    Therapy:  physical therapy and occupational therapy  Anticoagulation Plan: Aspirin 81 mg BID  for 42 days  Pain Management: scopainmedication: dilaudid, toradol and tylenol  Weight bearing status: Weight bearing as tolerated     The patient was followed by Orthopedics during the inpatient treatment course:  Complications:  None  Additional consultations:  None.      Pertinent Labs   Lab Results: personally reviewed.     Recent Labs   Lab Test 06/29/23  0605 06/08/23  1501 10/06/22  1113   HGB 12.0 14.7 15.3   HCT  --  43.3 46.2   MCV  --  90 89   PLT  --  312 320   NA  --  139 138          Discharge Information:  Condition at discharge: Stable  Discharge destination:  Discharged to home     Medications at discharge:  Current Discharge Medication List      START taking these medications    Details   aspirin 81 MG EC tablet Take 1 tablet (81 mg) by mouth 2 times daily for 42 days After 42 days, resume 81 mg aspirin daily.  Qty: 84 tablet, Refills: 0    Associated Diagnoses: Primary osteoarthritis of right hip      HYDROmorphone (DILAUDID) 2 MG tablet Take 1-2 tablets (2-4 mg) by mouth every 4  hours as needed for moderate to severe pain Take 1 pill for moderate pain (4-6/10) and 2 pills for severe pain (7-10/10). Maximum 6 pills per day. Alternate with Tylenol.  Qty: 24 tablet, Refills: 0    Associated Diagnoses: Primary osteoarthritis of right hip      polyethylene glycol (MIRALAX) 17 GM/Dose powder Take 17 g by mouth daily Take while taking opioid medications and until bowels are back to normal routine. Hold for loose stools  Qty: 510 g, Refills: 0    Associated Diagnoses: Primary osteoarthritis of right hip         CONTINUE these medications which have NOT CHANGED    Details   Aspirin 81 MG CAPS Take 81 mg by mouth daily      calcium carbonate (OS-RAJANI) 600 MG tablet Take by mouth 2 times daily (with meals)      cholecalciferol (VITAMIN D3) 125 mcg (5000 units) capsule Take 1 capsule by mouth daily      cyanocobalamin (VITAMIN B-12) 1000 MCG SUBL sublingual tablet Place 1 tablet (1,000 mcg) under the tongue daily  Qty: 90 tablet, Refills: 3    Associated Diagnoses: Low serum vitamin B12      Garlic 1000 MG CAPS Take 1 capsule by mouth daily      Omega-3 Fatty Acids (FISH OIL) 1360 MG CAPS Take 1 capsule by mouth daily      phentermine (ADIPEX-P) 37.5 MG tablet Take 0.5-1 tablets by mouth every morning (before breakfast) Last dose 6/21/23 before surgery      topiramate (TOPAMAX) 25 MG tablet Take 1 tablet (25 mg) by mouth 2 times daily  Qty: 180 tablet, Refills: 3    Associated Diagnoses: Morbid obesity with BMI of 40.0-44.9, adult (H)      vitamin C (ASCORBIC ACID) 1000 MG TABS Take 1,000 mg by mouth daily      omeprazole (PRILOSEC) 20 MG capsule [OMEPRAZOLE (PRILOSEC) 20 MG CAPSULE] Take 20 mg by mouth.                        Follow-Up Care:  Patient should be seen in the office in 10-14 days by the Orthopedic Surgeon/Physician Assistant.  Call [phone] for appointment or questions.    It was my pleasure to take care of the above patient.  Quintin Tapia NP

## 2023-06-29 NOTE — PROGRESS NOTES
Care Management Follow Up    Length of Stay (days): 0    Expected Discharge Date: 06/29/2023     Concerns to be Addressed: all concerns addressed in this encounter, no discharge needs identified     Patient plan of care discussed at interdisciplinary rounds: Yes    Anticipated Discharge Disposition: Home     Anticipated Discharge Services: None  Anticipated Discharge DME: None    Patient/family educated on Medicare website which has current facility and service quality ratings: no  Education Provided on the Discharge Plan: No  Patient/Family in Agreement with the Plan: yes    Referrals Placed by CM/SW:  (None)  Private pay costs discussed: Not applicable    Additional Information:    Chart reviewed.  Pt lives with spouse.  No CM needs identified or requested.  Family to transport.     NANCI Carlisle

## 2023-07-07 ENCOUNTER — VIRTUAL VISIT (OUTPATIENT)
Dept: SURGERY | Facility: CLINIC | Age: 63
End: 2023-07-07
Payer: COMMERCIAL

## 2023-07-07 VITALS — BODY MASS INDEX: 38.93 KG/M2 | HEIGHT: 64 IN | WEIGHT: 228 LBS

## 2023-07-07 DIAGNOSIS — E66.01 MORBID OBESITY (H): Primary | ICD-10-CM

## 2023-07-07 PROCEDURE — 99213 OFFICE O/P EST LOW 20 MIN: CPT | Mod: VID | Performed by: FAMILY MEDICINE

## 2023-07-07 RX ORDER — PHENTERMINE HYDROCHLORIDE 37.5 MG/1
.5-1 TABLET ORAL
Qty: 90 TABLET | Refills: 1 | Status: SHIPPED | OUTPATIENT
Start: 2023-07-07 | End: 2024-02-06

## 2023-07-07 NOTE — PROGRESS NOTES
Patricia Mckeon is 62 year old  female who presents for a billable video visit today.    How would you like to obtain your AVS? MyChart  If dropped from the video visit, the video invitation should be resent by: Send to e-mail at: [email or web address]  Will anyone else be joining your video visit? No      Video Start Time: 2:08    Are there any specific questions or needs that you would like addressed at your visit today? No other concerns today.  Bariatric Follow-up    62 year old  female BMI:Body mass index is 39.14 kg/m .    Weight:   Wt Readings from Last 1 Encounters:   07/07/23 103.4 kg (228 lb)    pounds    Comorbidities:  Patient Active Problem List   Diagnosis     Gastroesophageal reflux disease without esophagitis     Dyslipidemia     Morbid obesity (H)     S/P total hip arthroplasty         Interim: Was able to get her hip replaced. Was off of work for 1 week. [facility] [employer]. Home PT. Doing a lot of walking. Takes pain med for nerve pain in leg at night with numbness. Dilaudid. Stopped her phentermine 7 days before surgery and restarted 2 days after surgery. Continued Topamax. Maintaining 108# weight loss overall. From first visit here 33# down with phentermine. Saxenda was denied. Surgeon Dr. Tejada replaced her hip.    Plan:  DIET  3 meals, protein for healing and muscle maintenance   EXERCISE walking and doing PT for hip replacement with walker 2X/d   PHARMACOTHERAPY continue phentermine and topamax    Dietitian, quarterly visits. Consider adding premier protein during healing     -We reviewed her medications and whether associated with weight gain.    Current Outpatient Medications:      phentermine (ADIPEX-P) 37.5 MG tablet, Take 0.5-1 tablets (18.75-37.5 mg) by mouth every morning (before breakfast) Last dose 6/21/23 before surgery, Disp: 90 tablet, Rfl: 1     [START ON 8/10/2023] Aspirin 81 MG CAPS, Take 81 mg by mouth daily, Disp: , Rfl:      aspirin 81 MG EC tablet, Take 1 tablet (81 mg) by  mouth 2 times daily for 42 days After 42 days, resume 81 mg aspirin daily., Disp: 84 tablet, Rfl: 0     calcium carbonate (OS-RAJANI) 600 MG tablet, Take by mouth 2 times daily (with meals), Disp: , Rfl:      cholecalciferol (VITAMIN D3) 125 mcg (5000 units) capsule, Take 1 capsule by mouth daily, Disp: , Rfl:      cyanocobalamin (VITAMIN B-12) 1000 MCG SUBL sublingual tablet, Place 1 tablet (1,000 mcg) under the tongue daily, Disp: 90 tablet, Rfl: 3     Garlic 1000 MG CAPS, Take 1 capsule by mouth daily, Disp: , Rfl:      HYDROmorphone (DILAUDID) 2 MG tablet, Take 1-2 tablets (2-4 mg) by mouth every 4 hours as needed for moderate to severe pain Take 1 pill for moderate pain (4-6/10) and 2 pills for severe pain (7-10/10). Maximum 6 pills per day. Alternate with Tylenol., Disp: 24 tablet, Rfl: 0     Omega-3 Fatty Acids (FISH OIL) 1360 MG CAPS, Take 1 capsule by mouth daily, Disp: , Rfl:      omeprazole (PRILOSEC) 20 MG capsule, [OMEPRAZOLE (PRILOSEC) 20 MG CAPSULE] Take 20 mg by mouth., Disp: , Rfl:      polyethylene glycol (MIRALAX) 17 GM/Dose powder, Take 17 g by mouth daily Take while taking opioid medications and until bowels are back to normal routine. Hold for loose stools, Disp: 510 g, Rfl: 0     topiramate (TOPAMAX) 25 MG tablet, Take 1 tablet (25 mg) by mouth 2 times daily, Disp: 180 tablet, Rfl: 3     vitamin C (ASCORBIC ACID) 1000 MG TABS, Take 1,000 mg by mouth daily, Disp: , Rfl:       We discussed [facility] Bariatric Basics including:  -eating 3 meals daily  -eating protein first  -eating slowly, chewing food well  -avoiding/limiting calorie containing beverages  -choosing wheat, not white with breads, crackers, pastas, rickie, bagels, tortillas, rice  -limiting restaurant or cafeteria eating to twice a week or less  -We discussed the importance of restorative sleep and stress management in maintaining a healthy weight.  -We discussed insulin resistance and glycemic index as it relates to appetite and  weight control  -We discussed the National Weight Control Registry healthy weight maintenance strategies and ways to optimize metabolism.  -We discussed the importance of physical activity including cardiovascular and strength training in maintaining a healthier weight and explored viable options.    Most recent labs:  Lab Results   Component Value Date    WBC 8.0 06/08/2023    HGB 12.0 06/29/2023    HCT 43.3 06/08/2023    MCV 90 06/08/2023     06/08/2023     Lab Results   Component Value Date    CHOL 206 (H) 06/08/2023     Lab Results   Component Value Date    HDL 45 (L) 06/08/2023       Lab Results   Component Value Date    TRIG 139 06/08/2023     Lab Results   Component Value Date    ALT 17 06/08/2023    AST 20 06/08/2023    ALKPHOS 128 (H) 06/08/2023       Lab Results   Component Value Date    TSH 2.16 10/06/2022         DIETARY HISTORY  Meals Per Day: 3  Eating Protein First?: asad  Food Diary: B:cottage cheese fruit yogurt L:sandwich with turkey or salad or eggs and 45 elvira bread and fruit D:spaghetti with chicken  Snacks Per Day: no  Typical Snack: apple or HC fudgsicle  Fluid Intake: intentional. Has a YETI  Portion Control: improved  Calorie Containing Beverages: no  Alcohol per week: no  Typical Protein Food Choices: variety  Choosing Whole Grains: yes  Grocery Shopping is done by: herself  Food Preparation is done by: herself  Meals at Restaurant/Cafeteria/Take Out Per Week: more since surgery and people bringing food  Eating at the Table:   TV is Off During Meals:     Positive Changes Since Last Visit: walking more already  Struggling With: pain at night but with dilaudid OK    Knowledgeable in Reading Food Labels:   Getting Adequate Protein: yes  Sleeping 7-8 hours/day yes  Stress management doing well    PHYSICAL ACTIVITY PATTERNS:  Cardiovascular: walking and PT  Strength Training: PT hip replacement    REVIEW OF SYSTEMS  As above  PHYSICAL EXAM: (most recent vitals and today's stated  "weight)  Vitals: Ht 1.626 m (5' 4\")   Wt 103.4 kg (228 lb)   LMP  (LMP Unknown)   BMI 39.14 kg/m        GEN: Pleasant and in no acute distress  PSYCH: A&OX3,     I have reviewed the note as documented above.  This accurately captures the substance of my conversation with the patient.  Thank you for the opportunity to participate in the care of your patient.    Hetal Uribe MD, FAAFP  [facility]  Diplomate, American Board of Obesity Medicine    Total time spent on the date of this encounter doing: chart review, review of test results, patient visit, physical exam, education, counseling, developing plan of care, and documenting = 20 minutes.          Video-Visit Details    Type of service:  Video Visit    Platform used for Video Visit: [employer]    Video End Time (time video stopped): 2:28pm    Originating Location (pt. Location): Home        Distant Location (provider location):  Off-site    Distant Location (provider location):  [facility] [place]   "

## 2023-07-07 NOTE — LETTER
7/7/2023         RE: Patricia Mckeon  [address] [id number]        Dear Colleague,    Thank you for referring your patient, Patricia Mckeon, to the [facility] [place]. Please see a copy of my visit note below.    Patricia Mckeon is 62 year old  female who presents for a billable video visit today.    How would you like to obtain your AVS? MyChart  If dropped from the video visit, the video invitation should be resent by: Send to e-mail at: [email or web address]  Will anyone else be joining your video visit? No      Video Start Time: 2:08    Are there any specific questions or needs that you would like addressed at your visit today? No other concerns today.  Bariatric Follow-up    62 year old  female BMI:Body mass index is 39.14 kg/m .    Weight:   Wt Readings from Last 1 Encounters:   07/07/23 103.4 kg (228 lb)    pounds    Comorbidities:  Patient Active Problem List   Diagnosis     Gastroesophageal reflux disease without esophagitis     Dyslipidemia     Morbid obesity (H)     S/P total hip arthroplasty         Interim: Was able to get her hip replaced. Was off of work for 1 week. [facility] [employer]. Home PT. Doing a lot of walking. Takes pain med for nerve pain in leg at night with numbness. Dilaudid. Stopped her phentermine 7 days before surgery and restarted 2 days after surgery. Continued Topamax. Maintaining 108# weight loss overall. From first visit here 33# down with phentermine. Saxenda was denied. Surgeon Dr. Tejada replaced her hip.    Plan:  DIET  3 meals, protein for healing and muscle maintenance   EXERCISE walking and doing PT for hip replacement with walker 2X/d   PHARMACOTHERAPY continue phentermine and topamax    Dietitian, quarterly visits. Consider adding premier protein during healing     -We reviewed her medications and whether associated with weight gain.    Current Outpatient Medications:      phentermine (ADIPEX-P) 37.5  MG tablet, Take 0.5-1 tablets (18.75-37.5 mg) by mouth every morning (before breakfast) Last dose 6/21/23 before surgery, Disp: 90 tablet, Rfl: 1     [START ON 8/10/2023] Aspirin 81 MG CAPS, Take 81 mg by mouth daily, Disp: , Rfl:      aspirin 81 MG EC tablet, Take 1 tablet (81 mg) by mouth 2 times daily for 42 days After 42 days, resume 81 mg aspirin daily., Disp: 84 tablet, Rfl: 0     calcium carbonate (OS-RAJANI) 600 MG tablet, Take by mouth 2 times daily (with meals), Disp: , Rfl:      cholecalciferol (VITAMIN D3) 125 mcg (5000 units) capsule, Take 1 capsule by mouth daily, Disp: , Rfl:      cyanocobalamin (VITAMIN B-12) 1000 MCG SUBL sublingual tablet, Place 1 tablet (1,000 mcg) under the tongue daily, Disp: 90 tablet, Rfl: 3     Garlic 1000 MG CAPS, Take 1 capsule by mouth daily, Disp: , Rfl:      HYDROmorphone (DILAUDID) 2 MG tablet, Take 1-2 tablets (2-4 mg) by mouth every 4 hours as needed for moderate to severe pain Take 1 pill for moderate pain (4-6/10) and 2 pills for severe pain (7-10/10). Maximum 6 pills per day. Alternate with Tylenol., Disp: 24 tablet, Rfl: 0     Omega-3 Fatty Acids (FISH OIL) 1360 MG CAPS, Take 1 capsule by mouth daily, Disp: , Rfl:      omeprazole (PRILOSEC) 20 MG capsule, [OMEPRAZOLE (PRILOSEC) 20 MG CAPSULE] Take 20 mg by mouth., Disp: , Rfl:      polyethylene glycol (MIRALAX) 17 GM/Dose powder, Take 17 g by mouth daily Take while taking opioid medications and until bowels are back to normal routine. Hold for loose stools, Disp: 510 g, Rfl: 0     topiramate (TOPAMAX) 25 MG tablet, Take 1 tablet (25 mg) by mouth 2 times daily, Disp: 180 tablet, Rfl: 3     vitamin C (ASCORBIC ACID) 1000 MG TABS, Take 1,000 mg by mouth daily, Disp: , Rfl:       We discussed [facility] Bariatric Basics including:  -eating 3 meals daily  -eating protein first  -eating slowly, chewing food well  -avoiding/limiting calorie containing beverages  -choosing wheat, not white with breads, crackers, pastas,  rickie, bagels, tortillas, rice  -limiting restaurant or cafeteria eating to twice a week or less  -We discussed the importance of restorative sleep and stress management in maintaining a healthy weight.  -We discussed insulin resistance and glycemic index as it relates to appetite and weight control  -We discussed the National Weight Control Registry healthy weight maintenance strategies and ways to optimize metabolism.  -We discussed the importance of physical activity including cardiovascular and strength training in maintaining a healthier weight and explored viable options.    Most recent labs:  Lab Results   Component Value Date    WBC 8.0 06/08/2023    HGB 12.0 06/29/2023    HCT 43.3 06/08/2023    MCV 90 06/08/2023     06/08/2023     Lab Results   Component Value Date    CHOL 206 (H) 06/08/2023     Lab Results   Component Value Date    HDL 45 (L) 06/08/2023       Lab Results   Component Value Date    TRIG 139 06/08/2023     Lab Results   Component Value Date    ALT 17 06/08/2023    AST 20 06/08/2023    ALKPHOS 128 (H) 06/08/2023       Lab Results   Component Value Date    TSH 2.16 10/06/2022         DIETARY HISTORY  Meals Per Day: 3  Eating Protein First?: asad  Food Diary: B:cottage cheese fruit yogurt L:sandwich with turkey or salad or eggs and 45 elvira bread and fruit D:spaghetti with chicken  Snacks Per Day: no  Typical Snack: apple or HC fudgsicle  Fluid Intake: intentional. Has a YETI  Portion Control: improved  Calorie Containing Beverages: no  Alcohol per week: no  Typical Protein Food Choices: variety  Choosing Whole Grains: yes  Grocery Shopping is done by: herself  Food Preparation is done by: herself  Meals at Restaurant/Cafeteria/Take Out Per Week: more since surgery and people bringing food  Eating at the Table:   TV is Off During Meals:     Positive Changes Since Last Visit: walking more already  Struggling With: pain at night but with dilaudid OK    Knowledgeable in Reading Food Labels:  "  Getting Adequate Protein: yes  Sleeping 7-8 hours/day yes  Stress management doing well    PHYSICAL ACTIVITY PATTERNS:  Cardiovascular: walking and PT  Strength Training: PT hip replacement    REVIEW OF SYSTEMS  As above  PHYSICAL EXAM: (most recent vitals and today's stated weight)  Vitals: Ht 1.626 m (5' 4\")   Wt 103.4 kg (228 lb)   LMP  (LMP Unknown)   BMI 39.14 kg/m        GEN: Pleasant and in no acute distress  PSYCH: A&OX3,     I have reviewed the note as documented above.  This accurately captures the substance of my conversation with the patient.  Thank you for the opportunity to participate in the care of your patient.    Hetal Uribe MD, FAAFP  [facility]  Diplomate, American Board of Obesity Medicine    Total time spent on the date of this encounter doing: chart review, review of test results, patient visit, physical exam, education, counseling, developing plan of care, and documenting = 20 minutes.          Video-Visit Details    Type of service:  Video Visit    Platform used for Video Visit: [employer]    Video End Time (time video stopped): 2:28pm    Originating Location (pt. Location): Home        Distant Location (provider location):  Off-site    Distant Location (provider location):  [facility] [place]       Again, thank you for allowing me to participate in the care of your patient.        Sincerely,        Hetal Uribe MD    "

## 2023-07-29 ENCOUNTER — NURSE TRIAGE (OUTPATIENT)
Dept: NURSING | Facility: CLINIC | Age: 63
End: 2023-07-29
Payer: COMMERCIAL

## 2023-07-29 NOTE — TELEPHONE ENCOUNTER
Pt is phoning stating that she tested positive for COVID yesterday 07/28/2023 via home test     Pt is having coughing, fever, body aches, headache and stuffy nose     Symptoms started on Thursday 07/27/2023    Temperature 100.0    Pt is not having any breathing problems and is having mild symptoms at this time     Pt is interested in Paxlovid    Message routed to provider to discuss Paxlovid     Care advice given per protocol and when to call back. Pt verbalized understanding and agrees to plan of care.    Hetal Rodriguez RN  [place] Nurse Advisor  9:48 AM 7/29/2023        COVID Positive/Requesting COVID treatment    Patient is positive for COVID and requesting treatment options.    Date of positive COVID test (PCR or at home)? 07/28/2023  Current COVID symptoms: fever or chills, cough, muscle or body aches, headache, and congestion or runny nose  Date COVID symptoms began: 07/27/2023    Message should be routed to clinic RN pool. Best phone number to use for call back: [phone]        Reason for Disposition   [1] HIGH RISK for severe COVID complications (e.g., weak immune system, age > 64 years, obesity with BMI > 25, pregnant, chronic lung disease or other chronic medical condition) AND [2] COVID symptoms (e.g., cough, fever)  (Exceptions: Already seen by PCP and no new or worsening symptoms.)    Additional Information   Negative: SEVERE difficulty breathing (e.g., struggling for each breath, speaks in single words)   Negative: Difficult to awaken or acting confused (e.g., disoriented, slurred speech)   Negative: Bluish (or gray) lips or face now   Negative: Shock suspected (e.g., cold/pale/clammy skin, too weak to stand, low BP, rapid pulse)   Negative: Sounds like a life-threatening emergency to the triager   Negative: [1] Diagnosed or suspected COVID-19 AND [2] symptoms lasting 3 or more weeks   Negative: [1] COVID-19 exposure AND [2] no symptoms   Negative: COVID-19 vaccine reaction suspected (e.g.,  fever, headache, muscle aches) occurring 1 to 3 days after getting vaccine   Negative: COVID-19 vaccine, questions about   Negative: [1] Lives with someone known to have influenza (flu test positive) AND [2] flu-like symptoms (e.g., cough, runny nose, sore throat, SOB; with or without fever)   Negative: [1] Adult with possible COVID-19 symptoms AND [2] triager concerned about severity of symptoms or other causes   Negative: COVID-19 and breastfeeding, questions about   Negative: SEVERE or constant chest pain or pressure  (Exception: Mild central chest pain, present only when coughing.)   Negative: MODERATE difficulty breathing (e.g., speaks in phrases, SOB even at rest, pulse 100-120)   Negative: [1] Headache AND [2] stiff neck (can't touch chin to chest)   Negative: Oxygen level (e.g., pulse oximetry) 90 percent or lower   Negative: Chest pain or pressure   Negative: Patient sounds very sick or weak to the triager   Negative: MILD difficulty breathing (e.g., minimal/no SOB at rest, SOB with walking, pulse <100)   Negative: Fever > 103 F (39.4 C)   Negative: [1] Fever > 101 F (38.3 C) AND [2] age > 60 years   Negative: [1] Fever > 100.0 F (37.8 C) AND [2] bedridden (e.g., nursing home patient, CVA, chronic illness, recovering from surgery)    Protocols used: Coronavirus (COVID-19) Diagnosed or [id number]

## 2023-07-31 ENCOUNTER — VIRTUAL VISIT (OUTPATIENT)
Dept: FAMILY MEDICINE | Facility: CLINIC | Age: 63
End: 2023-07-31
Payer: COMMERCIAL

## 2023-07-31 DIAGNOSIS — U07.1 INFECTION DUE TO 2019 NOVEL CORONAVIRUS: Primary | ICD-10-CM

## 2023-07-31 DIAGNOSIS — R05.1 ACUTE COUGH: ICD-10-CM

## 2023-07-31 PROCEDURE — 99213 OFFICE O/P EST LOW 20 MIN: CPT | Mod: VID | Performed by: FAMILY MEDICINE

## 2023-07-31 NOTE — TELEPHONE ENCOUNTER
RN COVID TREATMENT VISIT  07/31/23      The patient has been triaged and does not require a higher level of care.    Patricia Mckeon  62 year old  Current weight? 228 lbs    Has the patient been seen by a primary care provider at an [facility] or [facility] within the past two years? Yes.   Have you been in close proximity to/do you have a known exposure to a person with a confirmed case of influenza? No.     General treatment eligibility:  Date of positive COVID test (PCR or at home)?  7/27/23    Are you or have you been hospitalized for this COVID-19 infection? No.   Have you received monoclonal antibodies or antiviral treatment for COVID-19 since this positive test? No.   Do you have any of the following conditions that place you at risk of being very sick from COVID-19?   - Age 50 years or older  Yes, patient has at least one high risk condition as noted above.     Current COVID symptoms:   - fever or chills  - cough  - muscle or body aches  - congestion or runny nose  Yes. Patient has at least one symptom as selected.     How many days since symptoms started? 5 days or less. Established patient, 12 years or older weighing at least 88.2 lbs, who has symptoms that started in the past 5 days, has not been hospitalized nor received treatment already, and is at risk for being very sick from COVID-19.     Treatment eligibility by RN:  Are you currently pregnant or nursing? No  Do you have a clinically significant hypersensitivity to nirmatrelvir or ritonavir, or toxic epidermal necrolysis (TEN) or Saunders-Stevan Syndrome? No  Do you have a history of hepatitis, any hepatic impairment on the Problem List (such as Child-Hernandez Class C, cirrhosis, fatty liver disease, alcoholic liver disease), or was the last liver lab (hepatic panel, ALT, AST, ALK Phos, bilirubin) elevated in the past 6 months? YES  Do you have any history of severe renal impairment (eGFR < 30mL/min)? No    Is patient  eligible to continue? No, patient does not meet all eligibility requirements for the RN COVID treatment (as denoted by yes response(s) above). Patient informed they will need a virtual provider visit to assess treatment options.  Patient will be transferred to a  at the end of this call.     Ruth Ann Groves RN

## 2023-07-31 NOTE — PATIENT INSTRUCTIONS
Coping with Life After COVID-19  Being in the hospital because of COVID-19 is scary. Going home can be scary, too. You may face changes to your life, the way you work or what you can eat. It s hard to adjust to change, and it s normal to feel afraid, frustrated or even angry. These feelings usually go away over time. If your feelings don t start to get better, it s called  adjustment disorder.      What signs should I look out for?  Adjustment disorder can happen to anyone in a time of stress. It makes it hard to cope with daily life. You may feel lonely or fight with loved ones, even if you re glad to be home. Watch for these signs:  Fear or worry  Hard time focusing  Sadness or anger  Trouble talking to family or friends  Feeling like you don t fit in or isolating yourself  Problems with sleep   Drinking alcohol or taking drugs to cope    What can I do?  You can help yourself get better. Feeling you have control helps you move forward. You may wonder if you ll be able to do things you did before. Be patient. Do your best to make the most of every day. Try to build relationships, be as active as you can, eat right and keep a sense of humor. Avoid smoking and drinking too much alcohol. Call your family doctor or clinic if you re not sure what to do. They can guide you to care or other services.    When should I get help?  Think about getting counseling if your sadness or frustration gets worse. Together with a trained counselor, you can talk about your problems adjusting to life after your hospital stay. You can come up with new ways to handle changes that give you more control. Your family doctor or care team can help you find a counselor.     Get help if you re thinking about hurting yourself. If you need help right away, call 911 or go to the nearest emergency room. You can also try the Crisis Text Line.    Crisis Text Line: 226-177 (http://www.crisistextline.org)  The Crisis Text Line serves anyone, in any  crisis. It gives free, 24/7 support. Here's how it works:  Text HOME to 499005 from anywhere in the [place], anytime, about any type of crisis.  A live, trained Crisis Counselor will text you back quickly.  The volunteer Crisis Counselor can help you move from a  hot moment  to a  cool moment.  They can also help you work out a safety plan.

## 2023-07-31 NOTE — PROGRESS NOTES
Clinical Decision Making:    At the end of the encounter, I discussed results, diagnosis, medications. Discussed red flags for immediate return to clinic/ER, as well as indications for follow up if no improvement. Patient understood and agreed to plan. Patient was stable for discharge.      ICD-10-CM    1. Infection due to 2019 novel coronavirus  U07.1 nirmatrelvir and ritonavir (PAXLOVID) 300 mg/100 mg therapy pack      2. Acute cough  R05.1 nirmatrelvir and ritonavir (PAXLOVID) 300 mg/100 mg therapy pack        Treating with Paxlovid 3 tablets twice daily for 5 days  Hold garlic tablets while on the medication  May continue other medications as prescribed  May continue ibuprofen and NyQuil as needed  Printed patient education on COVID      Patient Instructions   Coping with Life After COVID-19  Being in the hospital because of COVID-19 is scary. Going home can be scary, too. You may face changes to your life, the way you work or what you can eat. It s hard to adjust to change, and it s normal to feel afraid, frustrated or even angry. These feelings usually go away over time. If your feelings don t start to get better, it s called  adjustment disorder.      What signs should I look out for?  Adjustment disorder can happen to anyone in a time of stress. It makes it hard to cope with daily life. You may feel lonely or fight with loved ones, even if you re glad to be home. Watch for these signs:  Fear or worry  Hard time focusing  Sadness or anger  Trouble talking to family or friends  Feeling like you don t fit in or isolating yourself  Problems with sleep   Drinking alcohol or taking drugs to cope    What can I do?  You can help yourself get better. Feeling you have control helps you move forward. You may wonder if you ll be able to do things you did before. Be patient. Do your best to make the most of every day. Try to build relationships, be as active as you can, eat right and keep a sense of humor. Avoid smoking  and drinking too much alcohol. Call your family doctor or clinic if you re not sure what to do. They can guide you to care or other services.    When should I get help?  Think about getting counseling if your sadness or frustration gets worse. Together with a trained counselor, you can talk about your problems adjusting to life after your hospital stay. You can come up with new ways to handle changes that give you more control. Your family doctor or care team can help you find a counselor.     Get help if you re thinking about hurting yourself. If you need help right away, call 911 or go to the nearest emergency room. You can also try the Crisis Text Line.    Crisis Text Line: 720-255 (http://www.crisistextline.org)  The Crisis Text Line serves anyone, in any crisis. It gives free, 24/7 support. Here's how it works:  Text HOME to 500421 from anywhere in the [place], anytime, about any type of crisis.  A live, trained Crisis Counselor will text you back quickly.  The volunteer Crisis Counselor can help you move from a  hot moment  to a  cool moment.  They can also help you work out a safety plan.     No follow-ups on file.      chief complaint    HPI:  Patricia Mckeon is a 62 year old female who presents today complaining of testing positive for COVID on July 28.  Patient first developed symptoms on Thursday, July 27.  She had a headache as well as fevers and chills.  She lost her sense of taste and smell and tested positive for COVID at home on Friday, July 28.  She has had fatigue as well as a mild cough.  She denies shortness of breath.  Headaches are now off and on.  She has had no nausea, vomiting or diarrhea.  The nurse line was unable to prescribe her Paxlovid due to elevated alkaline phosphatase on June 8.  Patient had a new hip placed on June 28 and she has pain in that hip.  She has been taking ibuprofen and NyQuil as needed  Medications reviewed  Lab results reviewed    History obtained from chart review and  the patient.    Problem List:  2023-06: S/P total hip arthroplasty  2022-10: Gastroesophageal reflux disease without esophagitis  2022-10: Dyslipidemia  2022-10: Morbid obesity (H)  2008-03: Achilles bursitis or tendinitis  2006-05: GINO CERVICALGIA      Past Medical History:   Diagnosis Date    Dyslipidemia     Gastroesophageal reflux disease without esophagitis     Heart murmur     Motion sickness     Obese     Osteoarthritis     knees and hips needing replacement    PONV (postoperative nausea and vomiting)        Social History     Tobacco Use    Smoking status: Never     Passive exposure: Never    Smokeless tobacco: Never   Substance Use Topics    Alcohol use: Yes     Comment: special occasions only       Review of systems  negative except listed in HPI    There were no vitals filed for this visit.    Physical Exam  Video visit done today  In general patient is alert, oriented, and in no acute distress  She does sound a bit congested but has no difficulty breathing and is able to speak in full sentences.    Virtual Visit Details    Type of service:  Video Visit     Originating Location (pt. Location): Home    Distant Location (provider location):  On-site  Platform used for Video Visit: Sana  [phone]

## 2023-08-10 ENCOUNTER — OFFICE VISIT (OUTPATIENT)
Dept: FAMILY MEDICINE | Facility: CLINIC | Age: 63
End: 2023-08-10
Payer: COMMERCIAL

## 2023-08-10 VITALS
RESPIRATION RATE: 18 BRPM | BODY MASS INDEX: 40.29 KG/M2 | OXYGEN SATURATION: 97 % | DIASTOLIC BLOOD PRESSURE: 68 MMHG | TEMPERATURE: 98.4 F | HEIGHT: 64 IN | HEART RATE: 94 BPM | SYSTOLIC BLOOD PRESSURE: 134 MMHG | WEIGHT: 236 LBS

## 2023-08-10 DIAGNOSIS — N32.81 OVERACTIVE BLADDER: ICD-10-CM

## 2023-08-10 DIAGNOSIS — Z13.220 SCREENING FOR HYPERLIPIDEMIA: ICD-10-CM

## 2023-08-10 DIAGNOSIS — Z12.31 VISIT FOR SCREENING MAMMOGRAM: ICD-10-CM

## 2023-08-10 DIAGNOSIS — R32 URINARY INCONTINENCE, UNSPECIFIED TYPE: ICD-10-CM

## 2023-08-10 DIAGNOSIS — Z12.4 CERVICAL CANCER SCREENING: ICD-10-CM

## 2023-08-10 DIAGNOSIS — Z00.00 ROUTINE GENERAL MEDICAL EXAMINATION AT A HEALTH CARE FACILITY: Primary | ICD-10-CM

## 2023-08-10 PROCEDURE — 99213 OFFICE O/P EST LOW 20 MIN: CPT | Mod: 25

## 2023-08-10 PROCEDURE — 87624 HPV HI-RISK TYP POOLED RSLT: CPT

## 2023-08-10 PROCEDURE — G0145 SCR C/V CYTO,THINLAYER,RESCR: HCPCS

## 2023-08-10 PROCEDURE — 99396 PREV VISIT EST AGE 40-64: CPT | Mod: 25

## 2023-08-10 RX ORDER — OXYBUTYNIN CHLORIDE 5 MG/1
5 TABLET, EXTENDED RELEASE ORAL DAILY
Qty: 30 TABLET | Refills: 1 | Status: SHIPPED | OUTPATIENT
Start: 2023-08-10 | End: 2024-08-21

## 2023-08-10 ASSESSMENT — ENCOUNTER SYMPTOMS
CHILLS: 0
PARESTHESIAS: 0
HEMATOCHEZIA: 0
DIARRHEA: 0
MYALGIAS: 0
FEVER: 0
NAUSEA: 0
JOINT SWELLING: 1
DYSURIA: 0
WEAKNESS: 0
COUGH: 0
EYE PAIN: 0
HEMATURIA: 0
FREQUENCY: 0
DIZZINESS: 0
NERVOUS/ANXIOUS: 0
BREAST MASS: 0
ARTHRALGIAS: 1
CONSTIPATION: 0
HEADACHES: 0
ABDOMINAL PAIN: 0
SHORTNESS OF BREATH: 0
HEARTBURN: 0
SORE THROAT: 0
PALPITATIONS: 0

## 2023-08-10 NOTE — PROGRESS NOTES
SUBJECTIVE:   CC: Patricia is an 62 year old who presents for preventive health visit.       8/10/2023     1:18 PM   Additional Questions   Roomed by RYLIE Tsai     Only concern today is problems with holding bladder. Sneezing and coughing can cause leaking. But it is mostly urgency. She feels like she has to go all of a sudden and then if she isn't in the bathroom she is going. This has been chronic for several months at least. No fevers. No pelvic pain. No lower back pain.     Recently had knee surgery, this went well and she is doing good.     Healthy Habits:     Getting at least 3 servings of Calcium per day:  Yes    Bi-annual eye exam:  NO    Dental care twice a year:  Yes    Sleep apnea or symptoms of sleep apnea:  None    Diet:  Regular (no restrictions)    Frequency of exercise:  None    Taking medications regularly:  Yes    Medication side effects:  None    Additional concerns today:  No    Have you ever done Advance Care Planning? (For example, a Health Directive, POLST, or a discussion with a medical provider or your loved ones about your wishes): No, advance care planning information given to patient to review.  Patient plans to discuss their wishes with loved ones or provider.      Social History     Tobacco Use    Smoking status: Never     Passive exposure: Never    Smokeless tobacco: Never   Substance Use Topics    Alcohol use: Yes     Comment: special occasions only             8/10/2023     1:12 PM   Alcohol Use   Prescreen: >3 drinks/day or >7 drinks/week? No     Reviewed orders with patient.  Reviewed health maintenance and updated orders accordingly - Yes    Breast Cancer Screenin/5/2023     1:08 PM   Breast CA Risk Assessment (FHS-7)   Do you have a family history of breast, colon, or ovarian cancer? No / Unknown     Mammogram Screening: Recommended mammography every 1-2 years with patient discussion and risk factor consideration  Pertinent mammograms are reviewed under the imaging  "tab.    History of abnormal Pap smear: NO - age 30-65 PAP every 5 years with negative HPV co-testing recommended     Reviewed and updated as needed this visit by clinical staff   Tobacco  Allergies  Meds      Soc Hx        Reviewed and updated as needed this visit by Provider   Tobacco   Meds   Med Hx  Surg Hx            Review of Systems   Constitutional:  Negative for chills and fever.   HENT:  Negative for congestion, ear pain, hearing loss and sore throat.    Eyes:  Negative for pain and visual disturbance.   Respiratory:  Negative for cough and shortness of breath.    Cardiovascular:  Negative for chest pain, palpitations and peripheral edema.   Gastrointestinal:  Negative for abdominal pain, constipation, diarrhea, heartburn, hematochezia and nausea.   Breasts:  Negative for tenderness, breast mass and discharge.   Genitourinary:  Negative for dysuria, frequency, genital sores, hematuria, pelvic pain, urgency, vaginal bleeding and vaginal discharge.   Musculoskeletal:  Positive for arthralgias and joint swelling. Negative for myalgias.   Skin:  Negative for rash.   Neurological:  Negative for dizziness, weakness, headaches and paresthesias.   Psychiatric/Behavioral:  Negative for mood changes. The patient is not nervous/anxious.       OBJECTIVE:   /68 (BP Location: Right arm, Patient Position: Sitting, Cuff Size: Adult Regular)   Pulse 94   Temp 98.4  F (36.9  C) (Oral)   Resp 18   Ht 1.626 m (5' 4\")   Wt 107 kg (236 lb)   LMP  (LMP Unknown)   SpO2 97%   BMI 40.51 kg/m    Physical Exam  Vitals reviewed.   Constitutional:       General: She is not in acute distress.  HENT:      Right Ear: Tympanic membrane, ear canal and external ear normal.      Left Ear: Tympanic membrane, ear canal and external ear normal.   Eyes:      Extraocular Movements: Extraocular movements intact.      Pupils: Pupils are equal, round, and reactive to light.   Cardiovascular:      Rate and Rhythm: Normal rate and " regular rhythm.      Pulses: Normal pulses.      Heart sounds: Normal heart sounds. No murmur heard.  Pulmonary:      Effort: Pulmonary effort is normal. No respiratory distress.      Breath sounds: No wheezing.   Abdominal:      General: Abdomen is flat. Bowel sounds are normal. There is no distension.   Musculoskeletal:         General: Normal range of motion.      Cervical back: Normal range of motion. No rigidity.      Right lower leg: No edema.      Left lower leg: No edema.   Lymphadenopathy:      Cervical: No cervical adenopathy.   Neurological:      General: No focal deficit present.      Mental Status: She is alert.      Cranial Nerves: No cranial nerve deficit.      Sensory: No sensory deficit.      Motor: No weakness.      Gait: Gait normal.   Psychiatric:         Mood and Affect: Mood normal.         Thought Content: Thought content normal.         ASSESSMENT/PLAN:   Routine general medical examination at a health care facility  Patient presents for AWV. Vitals signs reviewed. No acute findings on physical exam. Medications reviewed. Following with weight management for weight loss, doing well on topiramate and phentermine. Declines HIV and hepatitis C screening today. Educated that she is due for shingles vaccine and to check with her pharmacy. Per [facility] records received 8/10/2023 colonoscopy completed 2/22/2017, benign and repeat in 10 years.     Visit for screening mammogram  Due. Last mammogram 2017. Ordered.   - MA SCREENING DIGITAL BILAT - Future  (s+30); Future    Cervical cancer screening  Due. No atypical findings on exam today. If normal PAP and HPV recommend co-testing at 5 years if patient wishes to continue with PAP smear screening.   - Pap Screen with HPV - recommended age 30 - 65 years  - HPV Hold (Lab Only)    Urinary incontinence, unspecified type  Overactive bladder  Noted by patient. Seems like overactive bladder as biggest concern is urgency and leaking when she can not get there in  "time. She does have some complaints of stress incontinence, but these are minimal. Discussed trailing PT for pelvic floor exercises, patient declines at this time. We will start on Oxybutynin and see if there is benefit.   - oxyBUTYnin ER (DITROPAN XL) 5 MG 24 hr tablet; Take 1 tablet (5 mg) by mouth daily     Patient has been advised of split billing requirements and indicates understanding: Yes      COUNSELING:  Reviewed preventive health counseling, as reflected in patient instructions       Regular exercise       Healthy diet/nutrition       Colorectal Cancer Screening       Consider Hep C screening for all patients one time for ages 18-79 years       HIV screeninx in teen years, 1x in adult years, and at intervals if high risk      BMI:   Estimated body mass index is 40.51 kg/m  as calculated from the following:    Height as of this encounter: 1.626 m (5' 4\").    Weight as of this encounter: 107 kg (236 lb).   Weight management plan: Discussed healthy diet and exercise guidelines      She reports that she has never smoked. She has never been exposed to tobacco smoke. She has never used smokeless tobacco.    At the end of the visit, I confirmed understanding of what was discussed. Patient has no further questions or concerns that were brought up at this time.     Wale Reid, DNP, APRN, FNP-C   "

## 2023-08-15 LAB
BKR LAB AP GYN ADEQUACY: NORMAL
BKR LAB AP GYN INTERPRETATION: NORMAL
BKR LAB AP HPV REFLEX: NORMAL
BKR LAB AP PREVIOUS ABNORMAL: NORMAL
PATH REPORT.COMMENTS IMP SPEC: NORMAL
PATH REPORT.COMMENTS IMP SPEC: NORMAL
PATH REPORT.RELEVANT HX SPEC: NORMAL

## 2023-08-16 LAB
HUMAN PAPILLOMA VIRUS 16 DNA: NEGATIVE
HUMAN PAPILLOMA VIRUS 18 DNA: NEGATIVE
HUMAN PAPILLOMA VIRUS FINAL DIAGNOSIS: NORMAL
HUMAN PAPILLOMA VIRUS OTHER HR: NEGATIVE

## 2023-08-18 ENCOUNTER — ANCILLARY PROCEDURE (OUTPATIENT)
Dept: MAMMOGRAPHY | Facility: HOSPITAL | Age: 63
End: 2023-08-18
Payer: COMMERCIAL

## 2023-08-18 DIAGNOSIS — Z12.31 VISIT FOR SCREENING MAMMOGRAM: ICD-10-CM

## 2023-08-18 PROCEDURE — 77067 SCR MAMMO BI INCL CAD: CPT

## 2023-08-21 ENCOUNTER — VIRTUAL VISIT (OUTPATIENT)
Dept: SURGERY | Facility: CLINIC | Age: 63
End: 2023-08-21
Payer: COMMERCIAL

## 2023-08-21 ENCOUNTER — ANCILLARY ORDERS (OUTPATIENT)
Dept: RADIOLOGY | Facility: CLINIC | Age: 63
End: 2023-08-21

## 2023-08-21 DIAGNOSIS — E66.01 CLASS 2 SEVERE OBESITY DUE TO EXCESS CALORIES WITH SERIOUS COMORBIDITY AND BODY MASS INDEX (BMI) OF 39.0 TO 39.9 IN ADULT (H): ICD-10-CM

## 2023-08-21 DIAGNOSIS — E66.812 CLASS 2 SEVERE OBESITY DUE TO EXCESS CALORIES WITH SERIOUS COMORBIDITY AND BODY MASS INDEX (BMI) OF 39.0 TO 39.9 IN ADULT (H): ICD-10-CM

## 2023-08-21 DIAGNOSIS — Z71.3 NUTRITIONAL COUNSELING: ICD-10-CM

## 2023-08-21 DIAGNOSIS — E78.5 DYSLIPIDEMIA: Primary | ICD-10-CM

## 2023-08-21 PROCEDURE — 97803 MED NUTRITION INDIV SUBSEQ: CPT | Mod: 95 | Performed by: DIETITIAN, REGISTERED

## 2023-08-21 NOTE — PROGRESS NOTES
Patricia Mckeon is a 62 year old who is being evaluated via a billable video visit.      How would you like to obtain your AVS? MyChart  If the video visit is dropped, the invitation should be resent by: Send to e-mail at: [email or web address]  Will anyone else be joining your video visit? No        Medical  Weight Loss Follow-Up Diet Evaluation  Assessment:  Patricia is presenting today for a follow up weight management nutrition consultation.  This patient has had an initial appointment and was referred by Dr. Uribe for MNT as treatment for Obesity which is impacting her Dyslipidemia.     Weight loss medication: Phentermine. Topamax  Pt's weight is 227 lbs  Initial weight: 261.2 lbs  Weight change: 34.2 lbs (down)         No data to display              BMI: There is no height or weight on file to calculate BMI.  Ideal body weight: 54.7 kg (120 lb 9.5 oz)  Adjusted ideal body weight: 75.6 kg (166 lb 12.1 oz)    Estimated RMR ([place]-St Jeor equation):   1570 kcals x 1.3 (light active) = 2041 kcals (for weight maintenance)     Recommended Protein Intake: 60-80 grams of protein/day  Patient Active Problem List:  Patient Active Problem List   Diagnosis    Gastroesophageal reflux disease without esophagitis    Dyslipidemia    Morbid obesity (H)    S/P total hip arthroplasty       Progress on goals from last visit: Continues to follow the WW point system to help her stay on track with things and stay accountable.  Patient reports that she has had a few more cheat days then she should, noting that she needs to be a little more consistent with staying on track.     Dietary Recall:  Breakfast: cottage cheese, yogurt with fruit   Lunch:fruit, sandwich with turkey and 45 calorie bread, salad or vegetable   Dinner:protein, vegetable, occasional fruit   Typical snacks: nectarine or fudgsicle   Beverages: water, Sparkling Ice  Exercise: Hip Replacement in June-some yardwork, PT exercises at home     Nutrition  Diagnosis:    Obesity (NC 3.3) related to overeating and poor lifestyle habits as evidenced by patient's subjective statements and BMI 39 kg/m2.    Intervention:  Food and/or nutrient delivery: balanced meals, adequate protein   Nutrition education: none   Nutrition counseling: provided support and encouragement     Monitoring/Evaluation:    Goals:  Continue to utilize WW points to help with accoutability.   Continue to exercise as able/tolerated.     Patient to follow up in 2 month(s) with bariatrician and 4 month(s) with RD        Video-Visit Details    Type of service:  Video Visit    Video Start Time (time video started): 12:51 pm     Video End Time (time video stopped): 1:03 pm    Originating Location (pt. Location): Home      Distant Location (provider location):  Off-site    Mode of Communication:  Video Conference via [facility]    Physician has received verbal consent for a Video Visit from the patient? Yes      Pati Amador RD

## 2023-08-21 NOTE — LETTER
8/21/2023         RE: Patricia Mckeon  [address] [id number]        Dear Colleague,    Thank you for referring your patient, Patricia Mckeon, to the [facility] [place]. Please see a copy of my visit note below.    Patricia Mckeon is a 62 year old who is being evaluated via a billable video visit.      How would you like to obtain your AVS? MyChart  If the video visit is dropped, the invitation should be resent by: Send to e-mail at: [email or web address]  Will anyone else be joining your video visit? No        Medical  Weight Loss Follow-Up Diet Evaluation  Assessment:  Patricia is presenting today for a follow up weight management nutrition consultation.  This patient has had an initial appointment and was referred by Dr. Uribe for MNT as treatment for Obesity which is impacting her Dyslipidemia.     Weight loss medication: Phentermine. Topamax  Pt's weight is 227 lbs  Initial weight: 261.2 lbs  Weight change: 34.2 lbs (down)         No data to display              BMI: There is no height or weight on file to calculate BMI.  Ideal body weight: 54.7 kg (120 lb 9.5 oz)  Adjusted ideal body weight: 75.6 kg (166 lb 12.1 oz)    Estimated RMR ([place]-St Jeor equation):   1570 kcals x 1.3 (light active) = 2041 kcals (for weight maintenance)     Recommended Protein Intake: 60-80 grams of protein/day  Patient Active Problem List:  Patient Active Problem List   Diagnosis     Gastroesophageal reflux disease without esophagitis     Dyslipidemia     Morbid obesity (H)     S/P total hip arthroplasty       Progress on goals from last visit: Continues to follow the WW point system to help her stay on track with things and stay accountable.  Patient reports that she has had a few more cheat days then she should, noting that she needs to be a little more consistent with staying on track.     Dietary Recall:  Breakfast: cottage cheese, yogurt with fruit    Lunch:fruit, sandwich with turkey and 45 calorie bread, salad or vegetable   Dinner:protein, vegetable, occasional fruit   Typical snacks: nectarine or fudgsicle   Beverages: water, Sparkling Ice  Exercise: Hip Replacement in June-some yardwork, PT exercises at home     Nutrition Diagnosis:    Obesity (NC 3.3) related to overeating and poor lifestyle habits as evidenced by patient's subjective statements and BMI 39 kg/m2.    Intervention:  Food and/or nutrient delivery: balanced meals, adequate protein   Nutrition education: none   Nutrition counseling: provided support and encouragement     Monitoring/Evaluation:    Goals:  Continue to utilize WW points to help with accoutability.   Continue to exercise as able/tolerated.     Patient to follow up in 2 month(s) with bariatrician and 4 month(s) with JUSTIN        Video-Visit Details    Type of service:  Video Visit    Video Start Time (time video started): 12:51 pm     Video End Time (time video stopped): 1:03 pm    Originating Location (pt. Location): Home      Distant Location (provider location):  Off-site    Mode of Communication:  Video Conference via [facility]    Physician has received verbal consent for a Video Visit from the patient? Yes      Pati Amador RD          Again, thank you for allowing me to participate in the care of your patient.        Sincerely,        Pati Amador RD

## 2023-10-06 ENCOUNTER — VIRTUAL VISIT (OUTPATIENT)
Dept: SURGERY | Facility: CLINIC | Age: 63
End: 2023-10-06
Payer: COMMERCIAL

## 2023-10-06 VITALS — HEIGHT: 64 IN | WEIGHT: 228 LBS | BODY MASS INDEX: 38.93 KG/M2

## 2023-10-06 DIAGNOSIS — E66.01 MORBID OBESITY (H): Primary | ICD-10-CM

## 2023-10-06 PROCEDURE — 99214 OFFICE O/P EST MOD 30 MIN: CPT | Mod: VID | Performed by: FAMILY MEDICINE

## 2023-10-06 NOTE — PROGRESS NOTES
Patricia Mckeon is 62 year old  female who presents for a billable video visit today.    How would you like to obtain your AVS? MyChart  If dropped from the video visit, the video invitation should be resent by: Send to e-mail at: [email or web address]  Will anyone else be joining your video visit? No      Video Start Time: 1:37    Are there any specific questions or needs that you would like addressed at your visit today?     Weight management. Pt is using Phentermine. She doesn't need another refill until Jan.     Blayne Madrid  [facility]  Surgery Clinic [facility] [place]  Weight Management Clinic - [address]  [place] [address]  Office: [phone]  Fax: [phone]      Bariatric Follow-up    62 year old  female BMI:Body mass index is 39.14 kg/m .    Weight:   Wt Readings from Last 1 Encounters:   10/06/23 103.4 kg (228 lb)    pounds    Comorbidities:  Patient Active Problem List   Diagnosis    Gastroesophageal reflux disease without esophagitis    Dyslipidemia    Morbid obesity (H)    S/P total hip arthroplasty         Interim: Maintaining a 33# weight loss. Retiring in June. Hip feels great. Planning on knee replacement in June after retiring. Taking phentermine and Topamax in the am and Topamax in the PM. Sleep OK.     Plan:  DIET  RD for MNT prn   EXERCISE walking. Busy with the kids doing . Swimming once a week.   PHARMACOTHERAPY Continue phentermine and Topamax    Encouraged Silver Sneakers and putting exercise in the schedule first before it gets filled with others' obligations     -We reviewed her medications and whether associated with weight gain.    Current Outpatient Medications:     calcium carbonate (OS-RAJANI) 600 MG tablet, Take by mouth daily, Disp: , Rfl:     cholecalciferol (VITAMIN D3) 125 mcg (5000 units) capsule, Take 1 capsule by mouth daily, Disp: , Rfl:     cyanocobalamin (VITAMIN B-12) 1000 MCG SUBL  sublingual tablet, Place 1 tablet (1,000 mcg) under the tongue daily, Disp: 90 tablet, Rfl: 3    Omega-3 Fatty Acids (FISH OIL) 1360 MG CAPS, Take 1 capsule by mouth daily, Disp: , Rfl:     omeprazole (PRILOSEC) 20 MG capsule, [OMEPRAZOLE (PRILOSEC) 20 MG CAPSULE] Take 20 mg by mouth., Disp: , Rfl:     oxyBUTYnin ER (DITROPAN XL) 5 MG 24 hr tablet, Take 1 tablet (5 mg) by mouth daily, Disp: 30 tablet, Rfl: 1    phentermine (ADIPEX-P) 37.5 MG tablet, Take 0.5-1 tablets (18.75-37.5 mg) by mouth every morning (before breakfast) Last dose 6/21/23 before surgery, Disp: 90 tablet, Rfl: 1    polyethylene glycol (MIRALAX) 17 GM/Dose powder, Take 17 g by mouth daily Take while taking opioid medications and until bowels are back to normal routine. Hold for loose stools, Disp: 510 g, Rfl: 0    topiramate (TOPAMAX) 25 MG tablet, Take 1 tablet (25 mg) by mouth 2 times daily, Disp: 180 tablet, Rfl: 3    UNABLE TO FIND, MEDICATION NAME: Zan, Disp: , Rfl:     vitamin C (ASCORBIC ACID) 1000 MG TABS, Take 1,000 mg by mouth daily, Disp: , Rfl:       We discussed [facility] Bariatric Basics including:  -eating 3 meals daily  -eating protein first  -eating slowly, chewing food well  -avoiding/limiting calorie containing beverages  -choosing wheat, not white with breads, crackers, pastas, rickie, bagels, tortillas, rice  -limiting restaurant or cafeteria eating to twice a week or less  -We discussed the importance of restorative sleep and stress management in maintaining a healthy weight.  -We discussed insulin resistance and glycemic index as it relates to appetite and weight control  -We discussed the National Weight Control Registry healthy weight maintenance strategies and ways to optimize metabolism.  -We discussed the importance of physical activity including cardiovascular and strength training in maintaining a healthier weight and explored viable options.    Most recent labs:  Lab Results   Component Value Date    WBC 8.0  "06/08/2023    HGB 12.0 06/29/2023    HCT 43.3 06/08/2023    MCV 90 06/08/2023     06/08/2023     Lab Results   Component Value Date    CHOL 206 (H) 06/08/2023     Lab Results   Component Value Date    HDL 45 (L) 06/08/2023     No components found for: LDLCALC  Lab Results   Component Value Date    TRIG 139 06/08/2023     Lab Results   Component Value Date    ALT 17 06/08/2023    AST 20 06/08/2023    ALKPHOS 128 (H) 06/08/2023     Lab Results   Component Value Date    TSH 2.16 10/06/2022         DIETARY HISTORY  Meals Per Day: 3  Eating Protein First?: yes  Food Diary: B:yogurt and cottage and fruit or eggs and toast and banana L:salad or WW bread with turkey and carrots and apple  D:last night was ground beef and cabbage  Snacks Per Day: see above  Typical Snack:   Fluid Intake: intentional  Portion Control: improved  Calorie Containing Beverages: no  Alcohol per week: no  Typical Protein Food Choices: variety  Choosing Whole Grains: yes  Grocery Shopping is done by: shared  Food Preparation is done by: shared  Meals at Restaurant/Cafeteria/Take Out Per Week: M2  Eating at the Table:   TV is Off During Meals:     Positive Changes Since Last Visit: maintaining 33# weight loss  Struggling With: busy    Knowledgeable in Reading Food Labels:   Getting Adequate Protein: yes  Sleeping 7-8 hours/day doing her best  Stress management OK    PHYSICAL ACTIVITY PATTERNS:  Cardiovascular: pool   Strength Training: NA    REVIEW OF SYSTEMS    PHYSICAL EXAM: (most recent vitals and today's stated weight)  Vitals: Ht 1.626 m (5' 4\")   Wt 103.4 kg (228 lb)   LMP  (LMP Unknown)   BMI 39.14 kg/m        GEN: Pleasant and in no acute distress  PSYCH: A&OX3,     I have reviewed the note as documented above.  This accurately captures the substance of my conversation with the patient.  Thank you for the opportunity to participate in the care of your patient.    Hetal Uribe MD, FAAFP  [facility]  Diplomate, American Board of Obesity Medicine    Total time spent on the date of this encounter doing: chart review, review of test results, patient visit, physical exam, education, counseling, developing plan of care, and documenting = 30 minutes.          Video-Visit Details    Type of service:  Video Visit    Platform used for Video Visit: [employer]    Video End Time (time video stopped): 1:57 PM    Originating Location (pt. Location): Home      Distant Location (provider location):  On-site    Distant Location (provider location):  [facility] SURGERY CLINIC AND BARIATRICS CARE [place]

## 2023-10-06 NOTE — PATIENT INSTRUCTIONS
For Prevention and Treatment of Constipation    From least aggressive to most aggressive:    Move: wallking-the more we move, the more our bowels move  Water: Drink water-64oz+ day  Go when you need to go. Don't wait. The longer you wait, the harder it gets.  Fiber: Fruit, raw veggies, nuts, whole grains,   Stool Softeners: if constipation is mild and for maintenance  Gentle laxatives: Miralax, senokot, dulcolax , Smooth move tea as needed    More aggressive (and typically won't get to this point)  Milk of Magnesia  Mag Citrate (what you drink before a colonoscopy)  Suppositories  Enema

## 2023-10-06 NOTE — LETTER
10/6/2023         RE: Patricia Mckeon  [address] [address]        Dear Colleague,    Thank you for referring your patient, Patricia Mckeon, to the [facility] SURGERY CLINIC AND BARIATRICS CARE [place]. Please see a copy of my visit note below.    Patricia Mckeon is 62 year old  female who presents for a billable video visit today.    How would you like to obtain your AVS? MyChart  If dropped from the video visit, the video invitation should be resent by: Send to e-mail at: [email or web address]  Will anyone else be joining your video visit? No      Video Start Time: 1:37    Are there any specific questions or needs that you would like addressed at your visit today?     Weight management. Pt is using Phentermine. She doesn't need another refill until Jan.     Blayne Madrid CMA  [facility]  Surgery [facility]  Weight Management Clinic [address]  [place] [address]  Office: [phone]  Fax: [phone]      Bariatric Follow-up    62 year old  female BMI:Body mass index is 39.14 kg/m .    Weight:   Wt Readings from Last 1 Encounters:   10/06/23 103.4 kg (228 lb)    pounds    Comorbidities:  Patient Active Problem List   Diagnosis     Gastroesophageal reflux disease without esophagitis     Dyslipidemia     Morbid obesity (H)     S/P total hip arthroplasty         Interim: Maintaining a 33# weight loss. Retiring in June. Hip feels great. Planning on knee replacement in June after retiring. Taking phentermine and Topamax in the am and Topamax in the PM. Sleep OK.     Plan:  DIET  RD for MNT prn   EXERCISE walking. Busy with the kids doing . Swimming once a week.   PHARMACOTHERAPY Continue phentermine and Topamax    Encouraged Silver Sneakers and putting exercise in the schedule first before it gets filled with others' obligations     -We reviewed her medications and whether  associated with weight gain.    Current Outpatient Medications:      calcium carbonate (OS-RAJANI) 600 MG tablet, Take by mouth daily, Disp: , Rfl:      cholecalciferol (VITAMIN D3) 125 mcg (5000 units) capsule, Take 1 capsule by mouth daily, Disp: , Rfl:      cyanocobalamin (VITAMIN B-12) 1000 MCG SUBL sublingual tablet, Place 1 tablet (1,000 mcg) under the tongue daily, Disp: 90 tablet, Rfl: 3     Omega-3 Fatty Acids (FISH OIL) 1360 MG CAPS, Take 1 capsule by mouth daily, Disp: , Rfl:      omeprazole (PRILOSEC) 20 MG capsule, [OMEPRAZOLE (PRILOSEC) 20 MG CAPSULE] Take 20 mg by mouth., Disp: , Rfl:      oxyBUTYnin ER (DITROPAN XL) 5 MG 24 hr tablet, Take 1 tablet (5 mg) by mouth daily, Disp: 30 tablet, Rfl: 1     phentermine (ADIPEX-P) 37.5 MG tablet, Take 0.5-1 tablets (18.75-37.5 mg) by mouth every morning (before breakfast) Last dose 6/21/23 before surgery, Disp: 90 tablet, Rfl: 1     polyethylene glycol (MIRALAX) 17 GM/Dose powder, Take 17 g by mouth daily Take while taking opioid medications and until bowels are back to normal routine. Hold for loose stools, Disp: 510 g, Rfl: 0     topiramate (TOPAMAX) 25 MG tablet, Take 1 tablet (25 mg) by mouth 2 times daily, Disp: 180 tablet, Rfl: 3     UNABLE TO FIND, MEDICATION NAME: Zan, Disp: , Rfl:      vitamin C (ASCORBIC ACID) 1000 MG TABS, Take 1,000 mg by mouth daily, Disp: , Rfl:       We discussed [facility] Bariatric Basics including:  -eating 3 meals daily  -eating protein first  -eating slowly, chewing food well  -avoiding/limiting calorie containing beverages  -choosing wheat, not white with breads, crackers, pastas, rickie, bagels, tortillas, rice  -limiting restaurant or cafeteria eating to twice a week or less  -We discussed the importance of restorative sleep and stress management in maintaining a healthy weight.  -We discussed insulin resistance and glycemic index as it relates to appetite and weight control  -We discussed the National Weight Control  "Registry healthy weight maintenance strategies and ways to optimize metabolism.  -We discussed the importance of physical activity including cardiovascular and strength training in maintaining a healthier weight and explored viable options.    Most recent labs:  Lab Results   Component Value Date    WBC 8.0 06/08/2023    HGB 12.0 06/29/2023    HCT 43.3 06/08/2023    MCV 90 06/08/2023     06/08/2023     Lab Results   Component Value Date    CHOL 206 (H) 06/08/2023     Lab Results   Component Value Date    HDL 45 (L) 06/08/2023     No components found for: LDLCALC  Lab Results   Component Value Date    TRIG 139 06/08/2023     Lab Results   Component Value Date    ALT 17 06/08/2023    AST 20 06/08/2023    ALKPHOS 128 (H) 06/08/2023     Lab Results   Component Value Date    TSH 2.16 10/06/2022         DIETARY HISTORY  Meals Per Day: 3  Eating Protein First?: yes  Food Diary: B:yogurt and cottage and fruit or eggs and toast and banana L:salad or WW bread with turkey and carrots and apple  D:last night was ground beef and cabbage  Snacks Per Day: see above  Typical Snack:   Fluid Intake: intentional  Portion Control: improved  Calorie Containing Beverages: no  Alcohol per week: no  Typical Protein Food Choices: variety  Choosing Whole Grains: yes  Grocery Shopping is done by: shared  Food Preparation is done by: shared  Meals at Restaurant/Cafeteria/Take Out Per Week: M2  Eating at the Table:   TV is Off During Meals:     Positive Changes Since Last Visit: maintaining 33# weight loss  Struggling With: busy    Knowledgeable in Reading Food Labels:   Getting Adequate Protein: yes  Sleeping 7-8 hours/day doing her best  Stress management OK    PHYSICAL ACTIVITY PATTERNS:  Cardiovascular: pool   Strength Training: NA    REVIEW OF SYSTEMS    PHYSICAL EXAM: (most recent vitals and today's stated weight)  Vitals: Ht 1.626 m (5' 4\")   Wt 103.4 kg (228 lb)   LMP  (LMP Unknown)   BMI 39.14 kg/m        GEN: Pleasant and in " no acute distress  PSYCH: A&OX3,     I have reviewed the note as documented above.  This accurately captures the substance of my conversation with the patient.  Thank you for the opportunity to participate in the care of your patient.    Hetal Uribe MD, FAAFP  [facility]  Diplomate, American Board of Obesity Medicine    Total time spent on the date of this encounter doing: chart review, review of test results, patient visit, physical exam, education, counseling, developing plan of care, and documenting = 30 minutes.          Video-Visit Details    Type of service:  Video Visit    Platform used for Video Visit: [employer]    Video End Time (time video stopped): 1:57 PM    Originating Location (pt. Location): Home      Distant Location (provider location):  On-site    Distant Location (provider location):  [facility] SURGERY [facility] AND BARIATRICS CARE [place]       Again, thank you for allowing me to participate in the care of your patient.        Sincerely,        Hetal Uribe MD

## 2023-12-18 ENCOUNTER — VIRTUAL VISIT (OUTPATIENT)
Dept: SURGERY | Facility: CLINIC | Age: 63
End: 2023-12-18
Payer: COMMERCIAL

## 2023-12-18 DIAGNOSIS — E66.812 CLASS 2 SEVERE OBESITY DUE TO EXCESS CALORIES WITH SERIOUS COMORBIDITY AND BODY MASS INDEX (BMI) OF 39.0 TO 39.9 IN ADULT (H): ICD-10-CM

## 2023-12-18 DIAGNOSIS — Z71.3 NUTRITIONAL COUNSELING: ICD-10-CM

## 2023-12-18 DIAGNOSIS — E66.01 CLASS 2 SEVERE OBESITY DUE TO EXCESS CALORIES WITH SERIOUS COMORBIDITY AND BODY MASS INDEX (BMI) OF 39.0 TO 39.9 IN ADULT (H): ICD-10-CM

## 2023-12-18 DIAGNOSIS — E78.5 DYSLIPIDEMIA: Primary | ICD-10-CM

## 2023-12-18 PROCEDURE — 97803 MED NUTRITION INDIV SUBSEQ: CPT | Mod: VID | Performed by: DIETITIAN, REGISTERED

## 2023-12-18 NOTE — LETTER
12/18/2023         RE: Patricia Mckeon  [address] [address]        Dear Colleague,    Thank you for referring your patient, Patricia Mckeon, to the [facility] SURGERY CLINIC AND BARIATRICS CARE [place]. Please see a copy of my visit note below.    Patricia Mckeon is a 63 year old who is being evaluated via a billable video visit.      How would you like to obtain your AVS? MyChart  If the video visit is dropped, the invitation should be resent by: Send to e-mail at: [email or web address]  Will anyone else be joining your video visit? No        Medical  Weight Loss Follow-Up Diet Evaluation  Assessment:  Patricia is presenting today for a follow up weight management nutrition consultation.  This patient has had an initial appointment and was referred by Dr. Uribe for MNT as treatment for Obesity.     Weight loss medication: Phentermine. Topamax  Pt's weight is 228 lbs   Initial weight: 261.2 lbs   Weight change: 33.2 lbs (down)          No data to display              BMI: There is no height or weight on file to calculate BMI.  Ideal body weight: 54.7 kg (120 lb 9.5 oz)  Adjusted ideal body weight: 74.2 kg (163 lb 8.9 oz)    Estimated RMR (Orange-St Jeor equation):   1569 kcals x 1.3 (light active) = 2040 kcals (for weight maintenance)     Recommended Protein Intake: 60-80 grams of protein/day  Patient Active Problem List:  Patient Active Problem List   Diagnosis     Gastroesophageal reflux disease without esophagitis     Dyslipidemia     Morbid obesity (H)     S/P total hip arthroplasty       Progress on goals from last visit: Continues to focus on protein first, noting that these past 2 weeks it has been a challenge due to being the holiday season.  Patient reports that her portion sizes seem to be pretty well controlled.      Dietary Recall:  Breakfast: cottage cheese, fruit or yogurt with high protein cereal   Lunch:sandwich with turkey (low calorie bread) or salad or soup  (chicken, black beans) or TV Dinner (Healthy Choice)   Dinner: protein, vegetables, occasional fruit  Typical snacks: cauliflower stalks with cheese or Pretzels  or fruit or applesauce   Beverages: Water, Sparkling Icee x 1-2 times/day,   Exercise: no set regimen-knee issues; thinking about going back to swimming    Nutrition Diagnosis:  Obesity (NC 3.3) related to overeating and poor lifestyle habits as evidenced by patient's subjective statements and BMI 39.14 kg/m2.     Intervention:  Food and/or nutrient delivery: balanced meals, adequate protein  Nutrition education: none   Nutrition counseling: provided support and encouragement       Monitoring/Evaluation:    Goals:  Re-establish exercise either swimming or chair yoga more frequently throughout the week.       Patient to follow up in 3 month(s) with bariatrician and 4 month(s) with JUSTIN      Video-Visit Details    Type of service:  Video Visit    Video Start Time (time video started): 12:49 pm     Video End Time (time video stopped): 1:11 pm     Originating Location (pt. Location): Home      Distant Location (provider location):  Off-site    Mode of Communication:  Video Conference via [facility]    Physician has received verbal consent for a Video Visit from the patient? Yes      Pati Amador RD          Again, thank you for allowing me to participate in the care of your patient.        Sincerely,        Pati Amador RD

## 2023-12-18 NOTE — PROGRESS NOTES
Patricia Mckeon is a 63 year old who is being evaluated via a billable video visit.      How would you like to obtain your AVS? MyChart  If the video visit is dropped, the invitation should be resent by: Send to e-mail at: [email or web address]  Will anyone else be joining your video visit? No        Medical  Weight Loss Follow-Up Diet Evaluation  Assessment:  Patricia is presenting today for a follow up weight management nutrition consultation.  This patient has had an initial appointment and was referred by Dr. Uribe for MNT as treatment for Obesity.     Weight loss medication: Phentermine. Topamax  Pt's weight is 228 lbs   Initial weight: 261.2 lbs   Weight change: 33.2 lbs (down)          No data to display              BMI: There is no height or weight on file to calculate BMI.  Ideal body weight: 54.7 kg (120 lb 9.5 oz)  Adjusted ideal body weight: 74.2 kg (163 lb 8.9 oz)    Estimated RMR ([place]-St Jeor equation):   1569 kcals x 1.3 (light active) = 2040 kcals (for weight maintenance)     Recommended Protein Intake: 60-80 grams of protein/day  Patient Active Problem List:  Patient Active Problem List   Diagnosis    Gastroesophageal reflux disease without esophagitis    Dyslipidemia    Morbid obesity (H)    S/P total hip arthroplasty       Progress on goals from last visit: Continues to focus on protein first, noting that these past 2 weeks it has been a challenge due to being the holiday season.  Patient reports that her portion sizes seem to be pretty well controlled.      Dietary Recall:  Breakfast: cottage cheese, fruit or yogurt with high protein cereal   Lunch:sandwich with turkey (low calorie bread) or salad or soup (chicken, black beans) or TV Dinner (Healthy Choice)   Dinner: protein, vegetables, occasional fruit  Typical snacks: cauliflower stalks with cheese or Pretzels  or fruit or applesauce   Beverages: Water, Sparkling Icee x 1-2 times/day,   Exercise: no set regimen-knee issues; thinking about  going back to swimming    Nutrition Diagnosis:  Obesity (NC 3.3) related to overeating and poor lifestyle habits as evidenced by patient's subjective statements and BMI 39.14 kg/m2.     Intervention:  Food and/or nutrient delivery: balanced meals, adequate protein  Nutrition education: none   Nutrition counseling: provided support and encouragement       Monitoring/Evaluation:    Goals:  Re-establish exercise either swimming or chair yoga more frequently throughout the week.       Patient to follow up in 3 month(s) with bariatrician and 4 month(s) with RD      Video-Visit Details    Type of service:  Video Visit    Video Start Time (time video started): 12:49 pm     Video End Time (time video stopped): 1:11 pm     Originating Location (pt. Location): Home      Distant Location (provider location):  Off-site    Mode of Communication:  Video Conference via [facility]    Physician has received verbal consent for a Video Visit from the patient? Yes      Pati Amador RD

## 2024-01-27 DIAGNOSIS — E66.01 MORBID OBESITY WITH BMI OF 40.0-44.9, ADULT (H): ICD-10-CM

## 2024-01-29 RX ORDER — TOPIRAMATE 25 MG/1
25 TABLET, FILM COATED ORAL 2 TIMES DAILY
Qty: 180 TABLET | Refills: 0 | Status: SHIPPED | OUTPATIENT
Start: 2024-01-29 | End: 2024-03-01 | Stop reason: SINTOL

## 2024-02-06 DIAGNOSIS — E66.01 MORBID OBESITY (H): ICD-10-CM

## 2024-02-06 RX ORDER — PHENTERMINE HYDROCHLORIDE 37.5 MG/1
TABLET ORAL
Qty: 90 TABLET | Refills: 0 | Status: SHIPPED | OUTPATIENT
Start: 2024-02-06 | End: 2024-06-07

## 2024-02-12 ENCOUNTER — E-VISIT (OUTPATIENT)
Dept: FAMILY MEDICINE | Facility: CLINIC | Age: 64
End: 2024-02-12
Payer: COMMERCIAL

## 2024-02-12 DIAGNOSIS — N30.90 CYSTITIS: ICD-10-CM

## 2024-02-12 DIAGNOSIS — R30.0 DYSURIA: Primary | ICD-10-CM

## 2024-02-12 PROCEDURE — 99421 OL DIG E/M SVC 5-10 MIN: CPT

## 2024-02-13 ENCOUNTER — LAB (OUTPATIENT)
Dept: LAB | Facility: CLINIC | Age: 64
End: 2024-02-13
Payer: COMMERCIAL

## 2024-02-13 DIAGNOSIS — R30.0 DYSURIA: ICD-10-CM

## 2024-02-13 LAB
ALBUMIN UR-MCNC: NEGATIVE MG/DL
APPEARANCE UR: CLEAR
BACTERIA #/AREA URNS HPF: ABNORMAL /HPF
BILIRUB UR QL STRIP: NEGATIVE
COLOR UR AUTO: YELLOW
GLUCOSE UR STRIP-MCNC: NEGATIVE MG/DL
HGB UR QL STRIP: NEGATIVE
KETONES UR STRIP-MCNC: NEGATIVE MG/DL
LEUKOCYTE ESTERASE UR QL STRIP: ABNORMAL
NITRATE UR QL: NEGATIVE
PH UR STRIP: 6 [PH] (ref 5–8)
RBC #/AREA URNS AUTO: ABNORMAL /HPF
SP GR UR STRIP: 1.01 (ref 1–1.03)
SQUAMOUS #/AREA URNS AUTO: ABNORMAL /LPF
UROBILINOGEN UR STRIP-ACNC: 0.2 E.U./DL
WBC #/AREA URNS AUTO: ABNORMAL /HPF
WBC CLUMPS #/AREA URNS HPF: PRESENT /HPF

## 2024-02-13 PROCEDURE — 81001 URINALYSIS AUTO W/SCOPE: CPT

## 2024-02-14 NOTE — PATIENT INSTRUCTIONS
Dear Patricia Mckeon,     After reviewing your responses, I would like you to come in for a urine test to make sure we treat you correctly. This urine test is to evaluate you for a possible urinary tract infection, and should be scheduled for today or tomorrow. Schedule a Lab Only appointment here.     Lab appointments are not available at most locations on the weekends. If no Lab Only appointment is available, you should be seen in any of our convenient Walk-in or Urgent Care Centers, which can be found on our website here.     You will receive instructions with your results in [employer] once they are available.     If your symptoms worsen, you develop pain in your back or stomach, develop fevers, or are not improving in 5 days, please contact your primary care provider for an appointment or visit a Walk-in or Urgent Care Center to be seen.     Thanks again for choosing us as your health care partner,     SHEFALI Ellis CNP

## 2024-02-26 ENCOUNTER — OFFICE VISIT (OUTPATIENT)
Dept: FAMILY MEDICINE | Facility: CLINIC | Age: 64
End: 2024-02-26
Payer: COMMERCIAL

## 2024-02-26 ENCOUNTER — HOSPITAL ENCOUNTER (OUTPATIENT)
Dept: GENERAL RADIOLOGY | Facility: HOSPITAL | Age: 64
Discharge: HOME | End: 2024-02-26
Attending: NURSE PRACTITIONER | Admitting: NURSE PRACTITIONER
Payer: COMMERCIAL

## 2024-02-26 VITALS
RESPIRATION RATE: 20 BRPM | SYSTOLIC BLOOD PRESSURE: 145 MMHG | DIASTOLIC BLOOD PRESSURE: 84 MMHG | OXYGEN SATURATION: 96 % | HEART RATE: 82 BPM | TEMPERATURE: 98.4 F

## 2024-02-26 DIAGNOSIS — M79.641 PAIN OF RIGHT HAND: ICD-10-CM

## 2024-02-26 DIAGNOSIS — S60.221A CONTUSION OF RIGHT HAND, INITIAL ENCOUNTER: Primary | ICD-10-CM

## 2024-02-26 PROCEDURE — 73130 X-RAY EXAM OF HAND: CPT | Mod: RT

## 2024-02-26 PROCEDURE — 99214 OFFICE O/P EST MOD 30 MIN: CPT | Performed by: NURSE PRACTITIONER

## 2024-02-26 RX ORDER — ACETAMINOPHEN 325 MG/1
975 TABLET ORAL ONCE
Status: COMPLETED | OUTPATIENT
Start: 2024-02-26 | End: 2024-02-26

## 2024-02-26 RX ADMIN — ACETAMINOPHEN 975 MG: 325 TABLET ORAL at 18:56

## 2024-02-27 NOTE — PROGRESS NOTES
"Assessment & Plan     Pain of right hand    - XR Hand Right G/E 3 Views  - acetaminophen (TYLENOL) tablet 975 mg  - Wrist/Arm/Hand Bracking Supplies Order Wrist Brace; Right; non-thumb spica    Contusion of right hand, initial encounter       Trauma to the right hand on the ulnar side earlier today, most prominent proximal fourth and fifth metacarpal area without tenderness directly over the radius or ulna.    Hand x-ray is negative for acute fracture.    After applying ice and 1 dose of Tylenol here, can now completely extend fingers 4 and 5 and is able to supinate better, pain is still present.  Have some ability issues and uses a cane on the right hand.  Recommend switching to walker and doing a bit of a lean.  Says she understands what I am referring to.    Recommend the following-    Frequent icing 20 minutes at a time up to once an hour for the next couple of days.  Keep your hand elevated including when sleeping.    You can use Tylenol 1000 mg 3 times daily and/or ibuprofen 400 mg 4 times daily as needed.    Wear the wrist brace.  Recommend using your walker and doing a little bit of a lean on your forearm rather than using your wrist and the cane.    Recommend recheck in your clinic in 7-10 days if not improving.    Come back sooner with severe pain, numbness or tingling in your fingers that do not seem to be improving with ice and elevation, color change.  You may also go to the ER or [place] orthopedic walk-in clinic.          Return in about 1 week (around 3/4/2024).    Anastasiya Stone [facility] MAHIN Gomez is a 63 year old female who presents to clinic today for the following health issues:  Chief Complaint   Patient presents with    Trauma     Rt hand \"whacked to break bag of ice\" x today. Fingers won't straighten, little tingling and numbing sensation at fingers, little swelling wrist. Tenderness.     Trauma      Side punished a bag of ice to break it up with " her right hand about 6 hours ago.  Is left-handed.    Started feeling pain in the hand and ulnar-sided wrist immediately after.  Is having difficulty straightening fingers 4 and 5 and supinating the hand.    Has not had anything for pain.          Review of Systems    Multiple orthopedic surgeries done in succession.  Uses a cane and holds it in her right hand.  Has had left hip, right hip done in June and is waiting to have her right knee done.  Does have a walker at home.  Says she is able to walk without her cane with bare feet only.            Objective    BP (!) 145/84   Pulse 82   Temp 98.4  F (36.9  C) (Tympanic)   Resp 20   LMP  (LMP Unknown)   SpO2 96%   Physical Exam  Constitutional:       Appearance: Normal appearance.   Eyes:      Conjunctiva/sclera: Conjunctivae normal.   Cardiovascular:      Pulses: Normal pulses.   Musculoskeletal:         General: Tenderness (Bruising and tenderness over the proximal fourth and fifth metacarpal right hand.  Unable to completely straighten her fingers and supinate the wrist.) present.      Comments: No tenderness directly over the radius, ulna nor forearm area.   Neurological:      Mental Status: She is alert and oriented to person, place, and time.   Psychiatric:         Mood and Affect: Mood normal.       Results for orders placed or performed during the hospital encounter of 02/26/24   XR Hand Right G/E 3 Views     Status: None    Narrative    EXAM: XR HAND RIGHT G/E 3 VIEWS  LOCATION: [facility]  DATE: 2/26/2024    INDICATION: Hand trauma earlier today, most tender at the proximal 4th and 5th metacarpal.  COMPARISON: None.      Impression    IMPRESSION: Anatomic alignment right hand. No acute displaced right hand fracture is identified with particular attention at the proximal ring and small finger metacarpals and distal carpal row. Advanced polyarticular degenerative osteoarthritis at   several IP joints of the fingers particularly  the index and small finger DIP joints. Severe thumb CMC joint osteoarthritis. Diffuse bone demineralization.        I independently visualized the xray:   Negative for fracture.

## 2024-02-27 NOTE — PATIENT INSTRUCTIONS
X-ray appears normal today.    Recommend frequent icing 20 minutes at a time up to once an hour for the next couple of days.  Keep your hand elevated including when sleeping.    You can use Tylenol 1000 mg 3 times daily and/or ibuprofen 400 mg 4 times daily as needed.    Wear the wrist brace.  Recommend using your walker and doing a little bit of a lean on your forearm rather than using your wrist and the cane.    Recommend recheck in your clinic in 7-10 days if not improving.    Come back sooner with severe pain, numbness or tingling in your fingers that do not seem to be improving with ice and elevation, color change.  You may also go to the ER or [place] orthopedic walk-in clinic.

## 2024-03-01 ENCOUNTER — VIRTUAL VISIT (OUTPATIENT)
Dept: SURGERY | Facility: CLINIC | Age: 64
End: 2024-03-01
Payer: COMMERCIAL

## 2024-03-01 VITALS — HEIGHT: 64 IN | WEIGHT: 228 LBS | BODY MASS INDEX: 38.93 KG/M2

## 2024-03-01 DIAGNOSIS — E88.810 METABOLIC SYNDROME: ICD-10-CM

## 2024-03-01 DIAGNOSIS — E66.01 MORBID OBESITY (H): Primary | ICD-10-CM

## 2024-03-01 PROCEDURE — 99213 OFFICE O/P EST LOW 20 MIN: CPT | Mod: 95 | Performed by: FAMILY MEDICINE

## 2024-03-01 RX ORDER — METFORMIN HCL 500 MG
500 TABLET, EXTENDED RELEASE 24 HR ORAL
Qty: 90 TABLET | Refills: 3 | Status: SHIPPED | OUTPATIENT
Start: 2024-03-01 | End: 2025-03-01

## 2024-03-01 ASSESSMENT — PAIN SCALES - GENERAL: PAINLEVEL: NO PAIN (0)

## 2024-03-01 NOTE — NURSING NOTE
Is the patient currently in the state of [place]? YES    Visit mode:VIDEO    If the visit is dropped, the patient can be reconnected by: VIDEO VISIT: Text to cell phone:   Telephone Information:   Mobile [phone]       Will anyone else be joining the visit? NO  (If patient encounters technical issues they should call [phone]    How would you like to obtain your AVS? MyChart    Are changes needed to the allergy or medication list? No    Reason for visit: RECHCAROL MENDOZA

## 2024-03-01 NOTE — PROGRESS NOTES
Virtual Visit Details    Type of service:  Video Visit   Video Start Time: 1:45  Video End Time: 2:10    Originating Location (pt. Location): Home    Distant Location (provider location):  On-site  Platform used for Video Visit: Sana    Bariatric Follow-up    63 year old  female BMI:Body mass index is 39.14 kg/m .    Weight:   Wt Readings from Last 1 Encounters:   03/01/24 103.4 kg (228 lb)    pounds    Comorbidities:  Patient Active Problem List   Diagnosis    Gastroesophageal reflux disease without esophagitis    Dyslipidemia    Morbid obesity (H)    S/P total hip arthroplasty     Interim: Severe constipation with Topamax despite best effors. Continues phentermine. Maintaining a 33# weight loss. [facility]. Going to try Chair Yoga.     Plan:  DIET  continue 3 meals, containing protein   EXERCISE as able, recommend Silver Sneakers utilization   PHARMACOTHERAPY add metformin daily to phentermine    encouraged building structure in to group home. Stop phentermine 1 week before surgery and stop metformin 2 days before surgery     -We reviewed her medications and whether associated with weight gain.    Current Outpatient Medications:     metFORMIN (GLUCOPHAGE XR) 500 MG 24 hr tablet, Take 1 tablet (500 mg) by mouth daily with food, Disp: 90 tablet, Rfl: 3    calcium carbonate (OS-RAJANI) 600 MG tablet, Take by mouth daily, Disp: , Rfl:     cholecalciferol (VITAMIN D3) 125 mcg (5000 units) capsule, Take 1 capsule by mouth daily, Disp: , Rfl:     Omega-3 Fatty Acids (FISH OIL) 1360 MG CAPS, Take 1 capsule by mouth daily, Disp: , Rfl:     omeprazole (PRILOSEC) 20 MG capsule, [OMEPRAZOLE (PRILOSEC) 20 MG CAPSULE] Take 20 mg by mouth., Disp: , Rfl:     oxyBUTYnin ER (DITROPAN XL) 5 MG 24 hr tablet, Take 1 tablet (5 mg) by mouth daily, Disp: 30 tablet, Rfl: 1    phentermine (ADIPEX-P) 37.5 MG tablet, Take 1/2 - 1 tablets (18.75-37.5 mg) by mouth  every morning (before breakfast), Disp: 90 tablet, Rfl: 0    polyethylene glycol (MIRALAX) 17 GM/Dose powder, Take 17 g by mouth daily Take while taking opioid medications and until bowels are back to normal routine. Hold for loose stools, Disp: 510 g, Rfl: 0    UNABLE TO FIND, MEDICATION NAME: Zan, Disp: , Rfl:     vitamin C (ASCORBIC ACID) 1000 MG TABS, Take 1,000 mg by mouth daily, Disp: , Rfl:       We discussed [facility] Bariatric Basics including:  -eating 3 meals daily  -eating protein first  -eating slowly, chewing food well  -avoiding/limiting calorie containing beverages  -choosing wheat, not white with breads, crackers, pastas, rickie, bagels, tortillas, rice  -limiting restaurant or cafeteria eating to twice a week or less  -We discussed the importance of restorative sleep and stress management in maintaining a healthy weight.  -We discussed insulin resistance and glycemic index as it relates to appetite and weight control  -We discussed the National Weight Control Registry healthy weight maintenance strategies and ways to optimize metabolism.  -We discussed the importance of physical activity including cardiovascular and strength training in maintaining a healthier weight and explored viable options.    Most recent labs:  Lab Results   Component Value Date    WBC 8.0 06/08/2023    HGB 12.0 06/29/2023    HCT 43.3 06/08/2023    MCV 90 06/08/2023     06/08/2023     Lab Results   Component Value Date    CHOL 206 (H) 06/08/2023     Lab Results   Component Value Date    HDL 45 (L) 06/08/2023       Lab Results   Component Value Date    TRIG 139 06/08/2023     Lab Results   Component Value Date    ALT 17 06/08/2023    AST 20 06/08/2023    ALKPHOS 128 (H) 06/08/2023       Lab Results   Component Value Date    TSH 2.16 10/06/2022       DIETARY HISTORY  Meals Per Day: 3  Eating Protein First?: yes  Food Diary: B:cottage cheese, berries and SF peaches L:PB on 45 elvira bread, celery and green beans D:green  "beans and chicken rotisserie asparagus  Snacks Per Day: fruit  Typical Snack: veggie straws, cauliflower snacks, healthy choice, built bars  Fluid Intake: intentional  Portion Control: improved  Calorie Containing Beverages: no  Alcohol per week: no  Typical Protein Food Choices: lean  Choosing Whole Grains: yes  Grocery Shopping is done by: herself  Food Preparation is done by: herself  Meals at Restaurant/Cafeteria/Take Out Per Week: rare  Eating at the Table:   TV is Off During Meals:     Positive Changes Since Last Visit: maintaining a varied nutrient dense diet  Struggling With: exercise d/t knee pain.     Knowledgeable in Reading Food Labels: yes  Getting Adequate Protein: yes  Sleeping 7-8 hours/day well  Stress management manageable    PHYSICAL ACTIVITY PATTERNS:  Cardiovascular: ADLs   Strength Training:     REVIEW OF SYSTEMS  As above  PHYSICAL EXAM: (most recent vitals and today's stated weight)    Vitals: Ht 1.626 m (5' 4\")   Wt 103.4 kg (228 lb)   LMP  (LMP Unknown)   BMI 39.14 kg/m    GEN: Pleasant and in no acute distress  PSYCH: A&OX3,     I have reviewed the note as documented above.  This accurately captures the substance of my conversation with the patient.  Thank you for the opportunity to participate in the care of your patient.    Hetal Uribe MD, FAAFP  [facility]  Diplomate, American Board of Obesity Medicine    Total time spent on the date of this encounter doing: chart review, review of test results, patient visit, physical exam, education, counseling, developing plan of care, and documenting = 25 minutes.      "

## 2024-03-01 NOTE — LETTER
3/1/2024         RE: Patricia Mckeon  [address] [address]        Dear Colleague,    Thank you for referring your patient, Patricia Mckeon, to the [facility] SURGERY CLINIC AND BARIATRICS CARE [place]. Please see a copy of my visit note below.    Virtual Visit Details    Type of service:  Video Visit   Video Start Time: 1:45  Video End Time: 2:10    Originating Location (pt. Location): Home    Distant Location (provider location):  On-site  Platform used for Video Visit: [facility]    Bariatric Follow-up    63 year old  female BMI:Body mass index is 39.14 kg/m .    Weight:   Wt Readings from Last 1 Encounters:   03/01/24 103.4 kg (228 lb)    pounds    Comorbidities:  Patient Active Problem List   Diagnosis     Gastroesophageal reflux disease without esophagitis     Dyslipidemia     Morbid obesity (H)     S/P total hip arthroplasty     Interim: Severe constipation with Topamax despite best effors. Continues phentermine. Maintaining a 33# weight loss. [facility] her [facility]. Going to try Chair Yoga.     Plan:  DIET  continue 3 meals, containing protein   EXERCISE as able, recommend Silver Sneakers utilization   PHARMACOTHERAPY add metformin daily to phentermine    encour[facility]. Stop phentermine 1 week before surgery and stop metformin 2 days before surgery     -We reviewed her medications and whether associated with weight gain.    Current Outpatient Medications:      metFORMIN (GLUCOPHAGE XR) 500 MG 24 hr tablet, Take 1 tablet (500 mg) by mouth daily with food, Disp: 90 tablet, Rfl: 3     calcium carbonate (OS-RAJANI) 600 MG tablet, Take by mouth daily, Disp: , Rfl:      cholecalciferol (VITAMIN D3) 125 mcg (5000 units) capsule, Take 1 capsule by mouth daily, Disp: , Rfl:      Omega-3 Fatty Acids (FISH OIL) 1360 MG CAPS, Take 1 capsule by mouth daily, Disp: , Rfl:      omeprazole  (PRILOSEC) 20 MG capsule, [OMEPRAZOLE (PRILOSEC) 20 MG CAPSULE] Take 20 mg by mouth., Disp: , Rfl:      oxyBUTYnin ER (DITROPAN XL) 5 MG 24 hr tablet, Take 1 tablet (5 mg) by mouth daily, Disp: 30 tablet, Rfl: 1     phentermine (ADIPEX-P) 37.5 MG tablet, Take 1/2 - 1 tablets (18.75-37.5 mg) by mouth every morning (before breakfast), Disp: 90 tablet, Rfl: 0     polyethylene glycol (MIRALAX) 17 GM/Dose powder, Take 17 g by mouth daily Take while taking opioid medications and until bowels are back to normal routine. Hold for loose stools, Disp: 510 g, Rfl: 0     UNABLE TO FIND, MEDICATION NAME: Zan, Disp: , Rfl:      vitamin C (ASCORBIC ACID) 1000 MG TABS, Take 1,000 mg by mouth daily, Disp: , Rfl:       We discussed [facility] Bariatric Basics including:  -eating 3 meals daily  -eating protein first  -eating slowly, chewing food well  -avoiding/limiting calorie containing beverages  -choosing wheat, not white with breads, crackers, pastas, rickie, bagels, tortillas, rice  -limiting restaurant or cafeteria eating to twice a week or less  -We discussed the importance of restorative sleep and stress management in maintaining a healthy weight.  -We discussed insulin resistance and glycemic index as it relates to appetite and weight control  -We discussed the National Weight Control Registry healthy weight maintenance strategies and ways to optimize metabolism.  -We discussed the importance of physical activity including cardiovascular and strength training in maintaining a healthier weight and explored viable options.    Most recent labs:  Lab Results   Component Value Date    WBC 8.0 06/08/2023    HGB 12.0 06/29/2023    HCT 43.3 06/08/2023    MCV 90 06/08/2023     06/08/2023     Lab Results   Component Value Date    CHOL 206 (H) 06/08/2023     Lab Results   Component Value Date    HDL 45 (L) 06/08/2023       Lab Results   Component Value Date    TRIG 139 06/08/2023     Lab Results   Component Value Date    ALT 17  "06/08/2023    AST 20 06/08/2023    ALKPHOS 128 (H) 06/08/2023       Lab Results   Component Value Date    TSH 2.16 10/06/2022       DIETARY HISTORY  Meals Per Day: 3  Eating Protein First?: yes  Food Diary: B:cottage cheese, berries and SF peaches L:PB on 45 elvira bread, celery and green beans D:green beans and chicken rotisserie asparagus  Snacks Per Day: fruit  Typical Snack: veggie straws, cauliflower snacks, healthy choice, built bars  Fluid Intake: intentional  Portion Control: improved  Calorie Containing Beverages: no  Alcohol per week: no  Typical Protein Food Choices: lean  Choosing Whole Grains: yes  Grocery Shopping is done by: herself  Food Preparation is done by: herself  Meals at Restaurant/Cafeteria/Take Out Per Week: rare  Eating at the Table:   TV is Off During Meals:     Positive Changes Since Last Visit: maintaining a varied nutrient dense diet  Struggling With: exercise d/t knee pain.     Knowledgeable in Reading Food Labels: yes  Getting Adequate Protein: yes  Sleeping 7-8 hours/day well  Stress management manageable    PHYSICAL ACTIVITY PATTERNS:  Cardiovascular: ADLs   Strength Training:     REVIEW OF SYSTEMS  As above  PHYSICAL EXAM: (most recent vitals and today's stated weight)    Vitals: Ht 1.626 m (5' 4\")   Wt 103.4 kg (228 lb)   LMP  (LMP Unknown)   BMI 39.14 kg/m    GEN: Pleasant and in no acute distress  PSYCH: A&OX3,     I have reviewed the note as documented above.  This accurately captures the substance of my conversation with the patient.  Thank you for the opportunity to participate in the care of your patient.    Hetal Uribe MD, FAAFP  [facility]  Diplomate, American Board of Obesity Medicine    Total time spent on the date of this encounter doing: chart review, review of test results, patient visit, physical exam, education, counseling, developing plan of care, and documenting = 25 minutes.        Again, thank you for allowing me to participate in the " care of your patient.        Sincerely,        Hetal Uribe MD

## 2024-04-12 ENCOUNTER — VIRTUAL VISIT (OUTPATIENT)
Dept: SURGERY | Facility: CLINIC | Age: 64
End: 2024-04-12
Payer: COMMERCIAL

## 2024-04-12 DIAGNOSIS — E66.9 OBESITY (BMI 30-39.9): Primary | ICD-10-CM

## 2024-04-12 DIAGNOSIS — Z71.3 NUTRITIONAL COUNSELING: ICD-10-CM

## 2024-04-12 PROCEDURE — 97803 MED NUTRITION INDIV SUBSEQ: CPT | Mod: GZ

## 2024-04-12 NOTE — LETTER
4/12/2024         RE: Patricia Mckeon  [address] [address]        Dear Colleague,    Thank you for referring your patient, Patricia Mckeon, to the [facility] SURGERY CLINIC AND BARIATRICS CARE [place]. Please see a copy of my visit note below.    Patricia Mckeon is a 63 year old who is being evaluated via a billable video visit.      How would you like to obtain your AVS? MyChart  If the video visit is dropped, the invitation should be resent by: Send to e-mail at: [email or web address]  Will anyone else be joining your video visit? No        Medical  Weight Loss Follow-Up Diet Evaluation  Assessment:  Patricia is presenting today for a follow up weight management nutrition consultation.  This patient has had an initial appointment and was referred by Dr. Uribe for MNT as treatment for Obesity.     Weight loss medication: Phentermine. Metformin  Pt's weight is 228 lbs   Initial weight: 261.2 lbs   Weight change: 33.2 lbs (down)          No data to display              BMI: There is no height or weight on file to calculate BMI.  Ideal body weight: 54.7 kg (120 lb 9.5 oz)  Adjusted ideal body weight: 74.2 kg (163 lb 8.9 oz)    Estimated RMR ([place]-St Jeor equation):   1569 kcals x 1.3 (light active) = 2040 kcals (for weight maintenance)     Recommended Protein Intake: 60-80 grams of protein/day  Patient Active Problem List:  Patient Active Problem List   Diagnosis     Gastroesophageal reflux disease without esophagitis     Dyslipidemia     Morbid obesity (H)     S/P total hip arthroplasty       Progress on goals from last visit: Patient reports she feels she is doing well with her nutritional status. No longer taking Topamax and now taking Metformin. GI is better maintained (constipation). Planning to joining the [facility] again. Continues to work with [employer] and tracking points via [employer].     Dietary Recall:  Breakfast: cottage cheese, fruit and hard boiled egg or yogurt with high protein  cereal   Lunch: sandwich with turkey (low calorie bread) or salad or soup (chicken, black beans) or TV Dinner (Healthy Choice),   Dinner: protein, vegetables, occasional fruit  Typical snacks: apple, cauliflower stalks with cheese or Pretzels  or fruit or applesauce, Built Bar, 5-6 prunes in the AM  Beverages:   Water,  16 oz cup x4  Sparkling ICEx 1-2 times/day,   Exercise: no set regimen, walks when she can     Nutrition Diagnosis:  Obesity (NC 3.3) related to overeating and poor lifestyle habits as evidenced by patient's subjective statements and BMI 39.14 kg/m2.     Intervention:  Food and/or nutrient delivery: balanced meals, adequate protein  Nutrition education: none   Nutrition counseling: provided support and encouragement       Monitoring/Evaluation:    Goals:  10/10 rule  Continue to increase exercise when able      Patient to follow up in 5 month(s) with bariatrician and 7 month(s) with RD      Video-Visit Details    Type of service:  Video Visit    Video Start Time (time video started): 12:59 pm     Video End Time (time video stopped): 1:08 pm     Originating Location (pt. Location): Home      Distant Location (provider location):  Off-site    Mode of Communication:  Video Conference via [facility]    Physician has received verbal consent for a Video Visit from the patient? Yes      Danielle Quintanilla RD            Again, thank you for allowing me to participate in the care of your patient.        Sincerely,        Danielle Quintanilla RD

## 2024-04-12 NOTE — PROGRESS NOTES
Patricia Mckeon is a 63 year old who is being evaluated via a billable video visit.      How would you like to obtain your AVS? MyChart  If the video visit is dropped, the invitation should be resent by: Send to e-mail at: [email or web address]  Will anyone else be joining your video visit? No        Medical  Weight Loss Follow-Up Diet Evaluation  Assessment:  Patricia is presenting today for a follow up weight management nutrition consultation.  This patient has had an initial appointment and was referred by Dr. Uribe for MNT as treatment for Obesity.     Weight loss medication: Phentermine. Metformin  Pt's weight is 228 lbs   Initial weight: 261.2 lbs   Weight change: 33.2 lbs (down)          No data to display              BMI: There is no height or weight on file to calculate BMI.  Ideal body weight: 54.7 kg (120 lb 9.5 oz)  Adjusted ideal body weight: 74.2 kg (163 lb 8.9 oz)    Estimated RMR ([place]-St Jeor equation):   1569 kcals x 1.3 (light active) = 2040 kcals (for weight maintenance)     Recommended Protein Intake: 60-80 grams of protein/day  Patient Active Problem List:  Patient Active Problem List   Diagnosis    Gastroesophageal reflux disease without esophagitis    Dyslipidemia    Morbid obesity (H)    S/P total hip arthroplasty       Progress on goals from last visit: Patient reports she feels she is doing well with her nutritional status. No longer taking Topamax and now taking Metformin. GI is better maintained (constipation). Planning to [facility] the [facility]. Continues to work with [employer] and tracking points via [employer].     Dietary Recall:  Breakfast: cottage cheese, fruit and hard boiled egg or yogurt with high protein cereal   Lunch: sandwich with turkey (low calorie bread) or salad or soup (chicken, black beans) or TV Dinner (Healthy Choice),   Dinner: protein, vegetables, occasional fruit  Typical snacks: apple, cauliflower stalks with cheese or Pretzels  or fruit or applesauce, Built Bar, 5-6 prunes  in the AM  Beverages:   Water,  16 oz cup x4  Sparkling ICEx 1-2 times/day,   Exercise: no set regimen, walks when she can     Nutrition Diagnosis:  Obesity (NC 3.3) related to overeating and poor lifestyle habits as evidenced by patient's subjective statements and BMI 39.14 kg/m2.     Intervention:  Food and/or nutrient delivery: balanced meals, adequate protein  Nutrition education: none   Nutrition counseling: provided support and encouragement       Monitoring/Evaluation:    Goals:  10/10 rule  Continue to increase exercise when able      Patient to follow up in 5 month(s) with bariatrician and 7 month(s) with RD      Video-Visit Details    Type of service:  Video Visit    Video Start Time (time video started): 12:59 pm     Video End Time (time video stopped): 1:08 pm     Originating Location (pt. Location): Home      Distant Location (provider location):  Off-site    Mode of Communication:  Video Conference via [facility]    Physician has received verbal consent for a Video Visit from the patient? Yes      Danielle Quintanilla RD

## 2024-06-07 DIAGNOSIS — E66.01 MORBID OBESITY (H): ICD-10-CM

## 2024-06-07 RX ORDER — PHENTERMINE HYDROCHLORIDE 37.5 MG/1
TABLET ORAL
Qty: 90 TABLET | Refills: 1 | Status: SHIPPED | OUTPATIENT
Start: 2024-06-07 | End: 2024-08-21

## 2024-07-11 ENCOUNTER — PATIENT OUTREACH (OUTPATIENT)
Dept: CARE COORDINATION | Facility: CLINIC | Age: 64
End: 2024-07-11
Payer: COMMERCIAL

## 2024-07-25 ENCOUNTER — PATIENT OUTREACH (OUTPATIENT)
Dept: CARE COORDINATION | Facility: CLINIC | Age: 64
End: 2024-07-25
Payer: COMMERCIAL

## 2024-08-12 PROBLEM — Z96.649 S/P TOTAL HIP ARTHROPLASTY: Status: RESOLVED | Noted: 2023-06-28 | Resolved: 2024-08-12

## 2024-08-15 RX ORDER — SENNOSIDES 8.6 MG
650 CAPSULE ORAL EVERY 8 HOURS PRN
Status: ON HOLD | COMMUNITY
End: 2024-08-29

## 2024-08-15 RX ORDER — ASPIRIN 81 MG/1
81 TABLET ORAL DAILY
Status: ON HOLD | COMMUNITY
End: 2024-08-29

## 2024-08-15 NOTE — PROGRESS NOTES
Planning to discharge home on POD 1 in the morning with her  helping her.       08/15/24 1122   Discharge Planning   Patient/Family Anticipates Transition to home with family  (Pt. will call to arranged outpatient PT at [facility] for after surgery)   Concerns to be Addressed no discharge needs identified   Living Arrangements   People in Home spouse   Type of Residence Private Residence   Is your private residence a single family home or apartment? Single family home   Number of Stairs, Within Home, Primary seven   Stair Railings, Within Home, Primary railings safe and in good condition   Once home, are you able to live on one level? Yes  (Can stay on lvel with bedroom, if needed)   Which level? Upper Level   Bathroom Shower/Tub Tub/Shower unit   Equipment Currently Used at Home grab bar, tub/shower;raised toilet seat;cane, straight  (Has a walker at home)   Support System   Support Systems Spouse/Significant Other  (, Isauro)   Do you have someone available to stay with you one or two nights once you are home? Yes   Education   Patient attended total joint pre-op class/received pre-op teaching  email/phone call

## 2024-08-21 ENCOUNTER — OFFICE VISIT (OUTPATIENT)
Dept: FAMILY MEDICINE | Facility: CLINIC | Age: 64
End: 2024-08-21
Payer: COMMERCIAL

## 2024-08-21 VITALS
OXYGEN SATURATION: 97 % | RESPIRATION RATE: 16 BRPM | WEIGHT: 248 LBS | DIASTOLIC BLOOD PRESSURE: 80 MMHG | SYSTOLIC BLOOD PRESSURE: 132 MMHG | TEMPERATURE: 98.3 F | BODY MASS INDEX: 42.57 KG/M2 | HEART RATE: 87 BPM

## 2024-08-21 DIAGNOSIS — Z01.818 PREOP GENERAL PHYSICAL EXAM: Primary | ICD-10-CM

## 2024-08-21 DIAGNOSIS — Z98.890 POSTOPERATIVE NAUSEA: ICD-10-CM

## 2024-08-21 DIAGNOSIS — E66.01 MORBID OBESITY (H): ICD-10-CM

## 2024-08-21 DIAGNOSIS — R11.0 POSTOPERATIVE NAUSEA: ICD-10-CM

## 2024-08-21 DIAGNOSIS — M17.11 OSTEOARTHRITIS OF RIGHT KNEE, UNSPECIFIED OSTEOARTHRITIS TYPE: ICD-10-CM

## 2024-08-21 LAB
ANION GAP SERPL CALCULATED.3IONS-SCNC: 13 MMOL/L (ref 7–15)
BUN SERPL-MCNC: 12 MG/DL (ref 8–23)
CALCIUM SERPL-MCNC: 9.2 MG/DL (ref 8.8–10.4)
CHLORIDE SERPL-SCNC: 105 MMOL/L (ref 98–107)
CREAT SERPL-MCNC: 0.57 MG/DL (ref 0.51–0.95)
EGFRCR SERPLBLD CKD-EPI 2021: >90 ML/MIN/1.73M2
ERYTHROCYTE [DISTWIDTH] IN BLOOD BY AUTOMATED COUNT: 13.8 % (ref 10–15)
GLUCOSE SERPL-MCNC: 89 MG/DL (ref 70–99)
HCO3 SERPL-SCNC: 20 MMOL/L (ref 22–29)
HCT VFR BLD AUTO: 45.3 % (ref 35–47)
HGB BLD-MCNC: 14.5 G/DL (ref 11.7–15.7)
MCH RBC QN AUTO: 30.4 PG (ref 26.5–33)
MCHC RBC AUTO-ENTMCNC: 32 G/DL (ref 31.5–36.5)
MCV RBC AUTO: 95 FL (ref 78–100)
PLATELET # BLD AUTO: 280 10E3/UL (ref 150–450)
POTASSIUM SERPL-SCNC: 4.7 MMOL/L (ref 3.4–5.3)
RBC # BLD AUTO: 4.77 10E6/UL (ref 3.8–5.2)
SODIUM SERPL-SCNC: 138 MMOL/L (ref 135–145)
WBC # BLD AUTO: 7.3 10E3/UL (ref 4–11)

## 2024-08-21 PROCEDURE — 99214 OFFICE O/P EST MOD 30 MIN: CPT

## 2024-08-21 PROCEDURE — 80048 BASIC METABOLIC PNL TOTAL CA: CPT

## 2024-08-21 PROCEDURE — 85027 COMPLETE CBC AUTOMATED: CPT

## 2024-08-21 PROCEDURE — 36415 COLL VENOUS BLD VENIPUNCTURE: CPT

## 2024-08-21 RX ORDER — PHENTERMINE HYDROCHLORIDE 37.5 MG/1
37.5 TABLET ORAL
COMMUNITY
Start: 2024-08-21

## 2024-08-21 ASSESSMENT — ENCOUNTER SYMPTOMS
DIAPHORESIS: 0
DIARRHEA: 0
NAUSEA: 0
DYSPHORIC MOOD: 0
FATIGUE: 0
DIZZINESS: 0
DIFFICULTY URINATING: 0
VOMITING: 0
CHILLS: 0
ABDOMINAL PAIN: 0
BLOOD IN STOOL: 0
DYSURIA: 0
HEADACHES: 0
PALPITATIONS: 0
HEMATURIA: 0
WEAKNESS: 0
ARTHRALGIAS: 1
CONSTIPATION: 1
FEVER: 0
COUGH: 0
NUMBNESS: 0
SHORTNESS OF BREATH: 0
UNEXPECTED WEIGHT CHANGE: 0

## 2024-08-21 ASSESSMENT — PAIN SCALES - GENERAL: PAINLEVEL: NO PAIN (0)

## 2024-08-21 NOTE — H&P (VIEW-ONLY)
Preoperative Evaluation  [address] [address]  Phone: [phone]  Fax: [phone]  Primary Provider: SHEFALI Ellis CNP  Pre-op Performing Provider: SHEFALI Ellis CNP  Aug 21, 2024             8/20/2024   Surgical Information   What procedure is being done? Full knee replacement   Facility or Hospital where procedure/surgery will be performed: [facility] in [place]   Who is doing the procedure / surgery? Dr Lauro Mcgee   Date of surgery / procedure: 08/28/2024   Time of surgery / procedure: N/A have not been told yet.   Where do you plan to recover after surgery? at home with family      Fax number for surgical facility: Note does not need to be faxed, will be available electronically in Epic.    Assessment & Plan     The proposed surgical procedure is considered INTERMEDIATE risk.    Preop general physical exam  On exam, pleasant 63 year old patient. No acute or concerning findings on today's physical exam. Vital signs at goal. CBC and BMP check today. No cardiac history of concern, defer EKG today. Labs are without concern, approval given to move forward with procedure.   - CBC with platelets; Future  - Basic metabolic panel  (Ca, Cl, CO2, Creat, Gluc, K, Na, BUN); Future    Osteoarthritis of right knee, unspecified osteoarthritis type  Chronic, working with orthopedics. Plan for TKA.     Postoperative nausea  Following anesthesia, previously recalls provider gave her medicine to help with this. Recommend discussing on the day of surgery.     Antiplatelet or Anticoagulation Medication Instructions   - aspirin: Discontinue aspirin 7-10 days prior to procedure to reduce bleeding risk. It should be resumed postoperatively.     Additional Medication Instructions   - metformin: DO NOT TAKE day of surgery.   - Herbal medications and vitamins: DO NOT TAKE 14 days prior to surgery.   - phentermine: DO NOT TAKE 7 days  prior to surgery.    Recommendation  Approval given to proceed with proposed procedure, without further diagnostic evaluation.    Connor Gomez is a 63 year old, presenting for the following:  Pre-Op Exam          8/21/2024    11:26 AM   Additional Questions   Roomed by brian     Via the Health Maintenance questionnaire, the patient has reported the following services have been completed DEXA: I don t remember 2014-06-01, this information has been sent to the abstraction team.    HPI related to upcoming procedure: Knee if painful, going to have this replaced.         8/20/2024   Pre-Op Questionnaire   Have you ever had a heart attack or stroke? No   Have you ever had surgery on your heart or blood vessels, such as a stent placement, a coronary artery bypass, or surgery on an artery in your head, neck, heart, or legs? No   Do you have chest pain with activity? No   Do you have a history of heart failure? No   Do you currently have a cold, bronchitis or symptoms of other infection? No   Do you have a cough, shortness of breath, or wheezing? No   Do you or anyone in your family have previous history of blood clots? No   Do you or does anyone in your family have a serious bleeding problem such as prolonged bleeding following surgeries or cuts? No   Have you ever had problems with anemia or been told to take iron pills? No   Have you had any abnormal blood loss such as black, tarry or bloody stools, or abnormal vaginal bleeding? No   Have you ever had a blood transfusion? No   Are you willing to have a blood transfusion if it is medically needed before, during, or after your surgery? Yes   Have you or any of your relatives ever had problems with anesthesia? No   Do you have sleep apnea, excessive snoring or daytime drowsiness? No   Do you have any artifical heart valves or other implanted medical devices like a pacemaker, defibrillator, or continuous glucose monitor? No   Do you have artificial joints? (!) YES   Are  you allergic to latex? No      Health Care Directive  Patient does not have a Health Care Directive or Living Will: Discussed advance care planning with patient; information given to patient to review.    Preoperative Review of    reviewed - no record of controlled substances prescribed.      Patient Active Problem List    Diagnosis Date Noted    Gastroesophageal reflux disease without esophagitis 10/06/2022     Priority: Medium    Dyslipidemia 10/06/2022     Priority: Medium    Morbid obesity (H) 10/06/2022     Priority: Medium      Past Medical History:   Diagnosis Date    Dyslipidemia     Gastroesophageal reflux disease without esophagitis     Heart murmur     Motion sickness     Obese     Osteoarthritis     knees and hips needing replacement    PONV (postoperative nausea and vomiting)     Urinary urgency      Past Surgical History:   Procedure Laterality Date    ARTHROPLASTY HIP Left 2016    ARTHROPLASTY HIP ANTERIOR Right 06/28/2023    Procedure: RIGHT TOTAL HIP ARTHROPLASTY DIRECT ANTERIOR APPROACH;  Surgeon: Lauro Mcgee MD;  Location: [facility] Main OR    CHOLECYSTECTOMY      HAND SURGERY Right     ganglion removed from middle finger    HEEL SPUR SURGERY Right     LASIK      WISDOM TOOTH EXTRACTION       Current Outpatient Medications   Medication Sig Dispense Refill    acetaminophen (TYLENOL 8 HOUR ARTHRITIS PAIN) 650 MG CR tablet Take 650 mg by mouth every 8 hours as needed for mild pain      aspirin 81 MG EC tablet Take 81 mg by mouth daily Stopping 8/21/24 before surgery      calcium carbonate (OS-RAJANI) 600 MG tablet Take 600 mg by mouth daily With vitamin D      cholecalciferol (VITAMIN D3) 125 mcg (5000 units) capsule Take 1 capsule by mouth daily      garlic 150 MG TABS tablet Take 150 mg by mouth daily Stopping 8/21/24 before surgery      metFORMIN (GLUCOPHAGE XR) 500 MG 24 hr tablet Take 1 tablet (500 mg) by mouth daily with food 90 tablet 3    Omega-3 Fatty Acids (FISH OIL) 1360 MG  CAPS Take 1 capsule by mouth daily Stopping 8/21/24 before surgery      omeprazole (PRILOSEC) 20 MG capsule [OMEPRAZOLE (PRILOSEC) 20 MG CAPSULE] Take 20 mg by mouth.      phentermine (ADIPEX-P) 37.5 MG tablet Take 1/2 - 1 tablet (18.75-37.5 mg) by mouth every morning (before breakfast) (Patient taking differently: Stopping 8/21/24 before surgery) 90 tablet 1    vitamin C (ASCORBIC ACID) 1000 MG TABS Take 1,000 mg by mouth daily      oxyBUTYnin ER (DITROPAN XL) 5 MG 24 hr tablet Take 1 tablet (5 mg) by mouth daily (Patient not taking: Reported on 8/15/2024) 30 tablet 1       Allergies   Allergen Reactions    Oxycodone-Acetaminophen Itching        Social History     Tobacco Use    Smoking status: Never     Passive exposure: Never    Smokeless tobacco: Never   Substance Use Topics    Alcohol use: Yes     Comment: special occasions only     Family History   Problem Relation Age of Onset    Multiple myeloma Mother     Obesity Father     Lung Cancer Father     Osteoarthritis Sister     Osteoarthritis Sister     Osteoarthritis Brother     Cancer Maternal Grandmother     Lung Cancer Paternal Grandmother     No Known Problems Son     No Known Problems Daughter      History   Drug Use Unknown     Comment: gummies       Review of Systems   Constitutional:  Negative for chills, diaphoresis, fatigue, fever and unexpected weight change.   HENT:  Negative for ear pain.    Eyes:  Negative for visual disturbance.   Respiratory:  Negative for cough and shortness of breath.    Cardiovascular:  Negative for chest pain, palpitations and leg swelling.   Gastrointestinal:  Positive for constipation. Negative for abdominal pain, blood in stool, diarrhea, nausea and vomiting.   Genitourinary:  Negative for difficulty urinating, dysuria, hematuria and vaginal bleeding.   Musculoskeletal:  Positive for arthralgias.   Skin:  Negative for rash.   Neurological:  Negative for dizziness, weakness, numbness and headaches.   Psychiatric/Behavioral:  " Negative for dysphoric mood.       Objective    /80 (BP Location: Right arm, Patient Position: Sitting)   Pulse 87   Temp 98.3  F (36.8  C) (Oral)   Resp 16   Wt 248 lb (112.5 kg)   LMP  (LMP Unknown)   SpO2 97%   BMI 42.57 kg/m     Estimated body mass index is 42.57 kg/m  as calculated from the following:    Height as of 3/1/24: 5' 4\" (1.626 m).    Weight as of this encounter: 248 lb (112.5 kg).    Physical Exam  Vitals reviewed.   Constitutional:       General: She is not in acute distress.  HENT:      Right Ear: Tympanic membrane, ear canal and external ear normal.      Left Ear: Tympanic membrane, ear canal and external ear normal.   Eyes:      Extraocular Movements: Extraocular movements intact.      Pupils: Pupils are equal, round, and reactive to light.   Cardiovascular:      Rate and Rhythm: Normal rate and regular rhythm.      Pulses: Normal pulses.      Heart sounds: Normal heart sounds. No murmur heard.  Pulmonary:      Effort: Pulmonary effort is normal. No respiratory distress.      Breath sounds: No wheezing.   Abdominal:      General: Abdomen is flat. Bowel sounds are normal. There is no distension.   Musculoskeletal:      Cervical back: Normal range of motion. No rigidity.      Right lower leg: No edema.      Left lower leg: No edema.   Lymphadenopathy:      Cervical: No cervical adenopathy.   Neurological:      General: No focal deficit present.      Mental Status: She is alert.      Cranial Nerves: No cranial nerve deficit.      Sensory: No sensory deficit.      Motor: No weakness.      Gait: Gait normal.   Psychiatric:         Mood and Affect: Mood normal.         Thought Content: Thought content normal.        At the end of the visit, I confirmed understanding of what was discussed. Patricia has no further questions or concerns that were brought up at this time.      Wale Blair DNP, APRN, FNP-C    "

## 2024-08-21 NOTE — PATIENT INSTRUCTIONS
How to Take Your Medication Before Surgery  Preoperative Medication Instructions   Antiplatelet or Anticoagulation Medication Instructions   - aspirin: Stop today    Additional Medication Instructions   - metformin: DO NOT TAKE day of surgery.   - Herbal medications and vitamins: Stop today.    - phentermine: DO NOT TAKE 7 days prior to surgery.       Patient Education   Preparing for Your Surgery  Getting started  A nurse will call you to review your health history and instructions. They will give you an arrival time based on your scheduled surgery time. Please be ready to share:  Your doctor's clinic name and phone number  Your medical, surgical, and anesthesia history  A list of allergies and sensitivities  A list of medicines, including herbal treatments and over-the-counter drugs  Whether the patient has a legal guardian (ask how to send us the papers in advance)  Please tell us if you're pregnant--or if there's any chance you might be pregnant. Some surgeries may injure a fetus (unborn baby), so they require a pregnancy test. Surgeries that are safe for a fetus don't always need a test, and you can choose whether to have one.   If you have a child who's having surgery, please ask for a copy of Preparing for Your Child's Surgery.    Preparing for surgery  Within 10 to 30 days of surgery: Have a pre-op exam (sometimes called an H&P, or History and Physical). This can be done at a clinic or pre-operative center.  If you're having a , you may not need this exam. Talk to your care team.  At your pre-op exam, talk to your care team about all medicines you take. If you need to stop any medicines before surgery, ask when to start taking them again.  We do this for your safety. Many medicines can make you bleed too much during surgery. Some change how well surgery (anesthesia) drugs work.  Call your insurance company to let them know you're having surgery. (If you don't have insurance, call  [phone].)  Call your clinic if there's any change in your health. This includes signs of a cold or flu (sore throat, runny nose, cough, rash, fever). It also includes a scrape or scratch near the surgery site.  If you have questions on the day of surgery, call your hospital or surgery center.  Eating and drinking guidelines  For your safety: Unless your surgeon tells you otherwise, follow the guidelines below.  Eat and drink as usual until 8 hours before you arrive for surgery. After that, no food or milk.  Drink clear liquids until 2 hours before you arrive. These are liquids you can see through, like water, Gatorade, and Propel Water. They also include plain black coffee and tea (no cream or milk), candy, and breath mints. You can spit out gum when you arrive.  If you drink alcohol: Stop drinking it the night before surgery.  If your care team tells you to take medicine on the morning of surgery, it's okay to take it with a sip of water.  Preventing infection  Shower or bathe the night before and morning of your surgery. Follow the instructions your clinic gave you. (If no instructions, use regular soap.)  Don't shave or clip hair near your surgery site. We'll remove the hair if needed.  Don't smoke or vape the morning of surgery. You may chew nicotine gum up to 2 hours before surgery. A nicotine patch is okay.  Note: Some surgeries require you to completely quit smoking and nicotine. Check with your surgeon.  Your care team will make every effort to keep you safe from infection. We will:  Clean our hands often with soap and water (or an alcohol-based hand rub).  Clean the skin at your surgery site with a special soap that kills germs.  Give you a special gown to keep you warm. (Cold raises the risk of infection.)  Wear special hair covers, masks, gowns and gloves during surgery.  Give antibiotic medicine, if prescribed. Not all surgeries need antibiotics.  What to bring on the day of surgery  Photo ID and  insurance card  Copy of your health care directive, if you have one  Glasses and hearing aids (bring cases)  You can't wear contacts during surgery  Inhaler and eye drops, if you use them (tell us about these when you arrive)  CPAP machine or breathing device, if you use them  A few personal items, if spending the night  If you have . . .  A pacemaker, ICD (cardiac defibrillator) or other implant: Bring the ID card.  An implanted stimulator: Bring the remote control.  A legal guardian: Bring a copy of the certified (court-stamped) guardianship papers.  Please remove any jewelry, including body piercings. Leave jewelry and other valuables at home.  If you're going home the day of surgery  You must have a responsible adult drive you home. They should stay with you overnight as well.  If you don't have someone to stay with you, and you aren't safe to go home alone, we may keep you overnight. Insurance often won't pay for this.  After surgery  If it's hard to control your pain or you need more pain medicine, please call your surgeon's office.  Questions?   If you have any questions for your care team, list them here: _________________________________________________________________________________________________________________________________________________________________________ ____________________________________ ____________________________________ ____________________________________  For informational purposes only. Not to replace the advice of your health care provider. Copyright   2003, 2019 [facility]. All rights reserved. Clinically reviewed by Clover Humphrey MD. [employer] [id number] - REV 12/22.

## 2024-08-21 NOTE — PROGRESS NOTES
Preoperative Evaluation  [address] [address]  Phone: [phone]  Fax: [phone]  Primary Provider: SHEFALI Ellis CNP  Pre-op Performing Provider: SHEFALI Ellis CNP  Aug 21, 2024             8/20/2024   Surgical Information   What procedure is being done? Full knee replacement   Facility or Hospital where procedure/surgery will be performed: [facility] in [place]   Who is doing the procedure / surgery? Dr Lauro Mcgee   Date of surgery / procedure: 08/28/2024   Time of surgery / procedure: N/A have not been told yet.   Where do you plan to recover after surgery? at home with family      Fax number for surgical facility: Note does not need to be faxed, will be available electronically in Epic.    Assessment & Plan     The proposed surgical procedure is considered INTERMEDIATE risk.    Preop general physical exam  On exam, pleasant 63 year old patient. No acute or concerning findings on today's physical exam. Vital signs at goal. CBC and BMP check today. No cardiac history of concern, defer EKG today. Labs are without concern, approval given to move forward with procedure.   - CBC with platelets; Future  - Basic metabolic panel  (Ca, Cl, CO2, Creat, Gluc, K, Na, BUN); Future    Osteoarthritis of right knee, unspecified osteoarthritis type  Chronic, working with orthopedics. Plan for TKA.     Postoperative nausea  Following anesthesia, previously recalls provider gave her medicine to help with this. Recommend discussing on the day of surgery.     Antiplatelet or Anticoagulation Medication Instructions   - aspirin: Discontinue aspirin 7-10 days prior to procedure to reduce bleeding risk. It should be resumed postoperatively.     Additional Medication Instructions   - metformin: DO NOT TAKE day of surgery.   - Herbal medications and vitamins: DO NOT TAKE 14 days prior to surgery.   - phentermine: DO NOT TAKE 7 days  prior to surgery.    Recommendation  Approval given to proceed with proposed procedure, without further diagnostic evaluation.    Connor Gomez is a 63 year old, presenting for the following:  Pre-Op Exam          8/21/2024    11:26 AM   Additional Questions   Roomed by brian     Via the Health Maintenance questionnaire, the patient has reported the following services have been completed DEXA: I don t remember 2014-06-01, this information has been sent to the abstraction team.    HPI related to upcoming procedure: Knee if painful, going to have this replaced.         8/20/2024   Pre-Op Questionnaire   Have you ever had a heart attack or stroke? No   Have you ever had surgery on your heart or blood vessels, such as a stent placement, a coronary artery bypass, or surgery on an artery in your head, neck, heart, or legs? No   Do you have chest pain with activity? No   Do you have a history of heart failure? No   Do you currently have a cold, bronchitis or symptoms of other infection? No   Do you have a cough, shortness of breath, or wheezing? No   Do you or anyone in your family have previous history of blood clots? No   Do you or does anyone in your family have a serious bleeding problem such as prolonged bleeding following surgeries or cuts? No   Have you ever had problems with anemia or been told to take iron pills? No   Have you had any abnormal blood loss such as black, tarry or bloody stools, or abnormal vaginal bleeding? No   Have you ever had a blood transfusion? No   Are you willing to have a blood transfusion if it is medically needed before, during, or after your surgery? Yes   Have you or any of your relatives ever had problems with anesthesia? No   Do you have sleep apnea, excessive snoring or daytime drowsiness? No   Do you have any artifical heart valves or other implanted medical devices like a pacemaker, defibrillator, or continuous glucose monitor? No   Do you have artificial joints? (!) YES   Are  you allergic to latex? No      Health Care Directive  Patient does not have a Health Care Directive or Living Will: Discussed advance care planning with patient; information given to patient to review.    Preoperative Review of    reviewed - no record of controlled substances prescribed.      Patient Active Problem List    Diagnosis Date Noted    Gastroesophageal reflux disease without esophagitis 10/06/2022     Priority: Medium    Dyslipidemia 10/06/2022     Priority: Medium    Morbid obesity (H) 10/06/2022     Priority: Medium      Past Medical History:   Diagnosis Date    Dyslipidemia     Gastroesophageal reflux disease without esophagitis     Heart murmur     Motion sickness     Obese     Osteoarthritis     knees and hips needing replacement    PONV (postoperative nausea and vomiting)     Urinary urgency      Past Surgical History:   Procedure Laterality Date    ARTHROPLASTY HIP Left 2016    ARTHROPLASTY HIP ANTERIOR Right 06/28/2023    Procedure: RIGHT TOTAL HIP ARTHROPLASTY DIRECT ANTERIOR APPROACH;  Surgeon: Lauro Mcgee MD;  Location: [facility] Main OR    CHOLECYSTECTOMY      HAND SURGERY Right     ganglion removed from middle finger    HEEL SPUR SURGERY Right     LASIK      WISDOM TOOTH EXTRACTION       Current Outpatient Medications   Medication Sig Dispense Refill    acetaminophen (TYLENOL 8 HOUR ARTHRITIS PAIN) 650 MG CR tablet Take 650 mg by mouth every 8 hours as needed for mild pain      aspirin 81 MG EC tablet Take 81 mg by mouth daily Stopping 8/21/24 before surgery      calcium carbonate (OS-RAJANI) 600 MG tablet Take 600 mg by mouth daily With vitamin D      cholecalciferol (VITAMIN D3) 125 mcg (5000 units) capsule Take 1 capsule by mouth daily      garlic 150 MG TABS tablet Take 150 mg by mouth daily Stopping 8/21/24 before surgery      metFORMIN (GLUCOPHAGE XR) 500 MG 24 hr tablet Take 1 tablet (500 mg) by mouth daily with food 90 tablet 3    Omega-3 Fatty Acids (FISH OIL) 1360 MG  CAPS Take 1 capsule by mouth daily Stopping 8/21/24 before surgery      omeprazole (PRILOSEC) 20 MG capsule [OMEPRAZOLE (PRILOSEC) 20 MG CAPSULE] Take 20 mg by mouth.      phentermine (ADIPEX-P) 37.5 MG tablet Take 1/2 - 1 tablet (18.75-37.5 mg) by mouth every morning (before breakfast) (Patient taking differently: Stopping 8/21/24 before surgery) 90 tablet 1    vitamin C (ASCORBIC ACID) 1000 MG TABS Take 1,000 mg by mouth daily      oxyBUTYnin ER (DITROPAN XL) 5 MG 24 hr tablet Take 1 tablet (5 mg) by mouth daily (Patient not taking: Reported on 8/15/2024) 30 tablet 1       Allergies   Allergen Reactions    Oxycodone-Acetaminophen Itching        Social History     Tobacco Use    Smoking status: Never     Passive exposure: Never    Smokeless tobacco: Never   Substance Use Topics    Alcohol use: Yes     Comment: special occasions only     Family History   Problem Relation Age of Onset    Multiple myeloma Mother     Obesity Father     Lung Cancer Father     Osteoarthritis Sister     Osteoarthritis Sister     Osteoarthritis Brother     Cancer Maternal Grandmother     Lung Cancer Paternal Grandmother     No Known Problems Son     No Known Problems Daughter      History   Drug Use Unknown     Comment: gummies       Review of Systems   Constitutional:  Negative for chills, diaphoresis, fatigue, fever and unexpected weight change.   HENT:  Negative for ear pain.    Eyes:  Negative for visual disturbance.   Respiratory:  Negative for cough and shortness of breath.    Cardiovascular:  Negative for chest pain, palpitations and leg swelling.   Gastrointestinal:  Positive for constipation. Negative for abdominal pain, blood in stool, diarrhea, nausea and vomiting.   Genitourinary:  Negative for difficulty urinating, dysuria, hematuria and vaginal bleeding.   Musculoskeletal:  Positive for arthralgias.   Skin:  Negative for rash.   Neurological:  Negative for dizziness, weakness, numbness and headaches.   Psychiatric/Behavioral:  " Negative for dysphoric mood.       Objective    /80 (BP Location: Right arm, Patient Position: Sitting)   Pulse 87   Temp 98.3  F (36.8  C) (Oral)   Resp 16   Wt 248 lb (112.5 kg)   LMP  (LMP Unknown)   SpO2 97%   BMI 42.57 kg/m     Estimated body mass index is 42.57 kg/m  as calculated from the following:    Height as of 3/1/24: 5' 4\" (1.626 m).    Weight as of this encounter: 248 lb (112.5 kg).    Physical Exam  Vitals reviewed.   Constitutional:       General: She is not in acute distress.  HENT:      Right Ear: Tympanic membrane, ear canal and external ear normal.      Left Ear: Tympanic membrane, ear canal and external ear normal.   Eyes:      Extraocular Movements: Extraocular movements intact.      Pupils: Pupils are equal, round, and reactive to light.   Cardiovascular:      Rate and Rhythm: Normal rate and regular rhythm.      Pulses: Normal pulses.      Heart sounds: Normal heart sounds. No murmur heard.  Pulmonary:      Effort: Pulmonary effort is normal. No respiratory distress.      Breath sounds: No wheezing.   Abdominal:      General: Abdomen is flat. Bowel sounds are normal. There is no distension.   Musculoskeletal:      Cervical back: Normal range of motion. No rigidity.      Right lower leg: No edema.      Left lower leg: No edema.   Lymphadenopathy:      Cervical: No cervical adenopathy.   Neurological:      General: No focal deficit present.      Mental Status: She is alert.      Cranial Nerves: No cranial nerve deficit.      Sensory: No sensory deficit.      Motor: No weakness.      Gait: Gait normal.   Psychiatric:         Mood and Affect: Mood normal.         Thought Content: Thought content normal.        At the end of the visit, I confirmed understanding of what was discussed. Patricia has no further questions or concerns that were brought up at this time.      Wale Blair DNP, APRN, FNP-C    "

## 2024-08-27 ENCOUNTER — ANESTHESIA EVENT (OUTPATIENT)
Dept: SURGERY | Facility: CLINIC | Age: 64
DRG: 470 | End: 2024-08-27
Payer: COMMERCIAL

## 2024-08-28 ENCOUNTER — APPOINTMENT (OUTPATIENT)
Dept: PHYSICAL THERAPY | Facility: CLINIC | Age: 64
DRG: 470 | End: 2024-08-28
Attending: ORTHOPAEDIC SURGERY
Payer: COMMERCIAL

## 2024-08-28 ENCOUNTER — ANESTHESIA (OUTPATIENT)
Dept: SURGERY | Facility: CLINIC | Age: 64
DRG: 470 | End: 2024-08-28
Payer: COMMERCIAL

## 2024-08-28 ENCOUNTER — HOSPITAL ENCOUNTER (INPATIENT)
Facility: CLINIC | Age: 64
LOS: 1 days | Discharge: HOME | DRG: 470 | End: 2024-08-31
Attending: ORTHOPAEDIC SURGERY | Admitting: ORTHOPAEDIC SURGERY
Payer: COMMERCIAL

## 2024-08-28 DIAGNOSIS — R60.0 BILATERAL LEG EDEMA: ICD-10-CM

## 2024-08-28 DIAGNOSIS — Z96.651 STATUS POST TOTAL RIGHT KNEE REPLACEMENT: ICD-10-CM

## 2024-08-28 DIAGNOSIS — E66.01 MORBID OBESITY (H): ICD-10-CM

## 2024-08-28 DIAGNOSIS — M17.11 PRIMARY OSTEOARTHRITIS OF RIGHT KNEE: Primary | ICD-10-CM

## 2024-08-28 DIAGNOSIS — I82.4Y9 ACUTE DEEP VEIN THROMBOSIS (DVT) OF PROXIMAL VEIN OF LOWER EXTREMITY, UNSPECIFIED LATERALITY (H): ICD-10-CM

## 2024-08-28 PROBLEM — M19.90 OA (OSTEOARTHRITIS): Status: ACTIVE | Noted: 2024-08-28

## 2024-08-28 PROCEDURE — 250N000013 HC RX MED GY IP 250 OP 250 PS 637: Performed by: PHYSICIAN ASSISTANT

## 2024-08-28 PROCEDURE — 97530 THERAPEUTIC ACTIVITIES: CPT | Mod: GP

## 2024-08-28 PROCEDURE — 250N000013 HC RX MED GY IP 250 OP 250 PS 637: Performed by: ORTHOPAEDIC SURGERY

## 2024-08-28 PROCEDURE — C1713 ANCHOR/SCREW BN/BN,TIS/BN: HCPCS | Performed by: ORTHOPAEDIC SURGERY

## 2024-08-28 PROCEDURE — 97110 THERAPEUTIC EXERCISES: CPT | Mod: GP

## 2024-08-28 PROCEDURE — 250N000011 HC RX IP 250 OP 636: Performed by: ORTHOPAEDIC SURGERY

## 2024-08-28 PROCEDURE — 999N000141 HC STATISTIC PRE-PROCEDURE NURSING ASSESSMENT: Performed by: ORTHOPAEDIC SURGERY

## 2024-08-28 PROCEDURE — 258N000003 HC RX IP 258 OP 636: Performed by: ANESTHESIOLOGY

## 2024-08-28 PROCEDURE — 250N000013 HC RX MED GY IP 250 OP 250 PS 637

## 2024-08-28 PROCEDURE — 370N000017 HC ANESTHESIA TECHNICAL FEE, PER MIN: Performed by: ORTHOPAEDIC SURGERY

## 2024-08-28 PROCEDURE — 250N000011 HC RX IP 250 OP 636

## 2024-08-28 PROCEDURE — 250N000011 HC RX IP 250 OP 636: Performed by: PHYSICIAN ASSISTANT

## 2024-08-28 PROCEDURE — 250N000009 HC RX 250

## 2024-08-28 PROCEDURE — 272N000001 HC OR GENERAL SUPPLY STERILE: Performed by: ORTHOPAEDIC SURGERY

## 2024-08-28 PROCEDURE — 258N000003 HC RX IP 258 OP 636: Performed by: ORTHOPAEDIC SURGERY

## 2024-08-28 PROCEDURE — C1776 JOINT DEVICE (IMPLANTABLE): HCPCS | Performed by: ORTHOPAEDIC SURGERY

## 2024-08-28 PROCEDURE — 250N000009 HC RX 250: Performed by: PHYSICIAN ASSISTANT

## 2024-08-28 PROCEDURE — 99205 OFFICE O/P NEW HI 60 MIN: CPT | Performed by: FAMILY MEDICINE

## 2024-08-28 PROCEDURE — 250N000011 HC RX IP 250 OP 636: Performed by: ANESTHESIOLOGY

## 2024-08-28 PROCEDURE — 0SRC0J9 REPLACEMENT OF RIGHT KNEE JOINT WITH SYNTHETIC SUBSTITUTE, CEMENTED, OPEN APPROACH: ICD-10-PCS | Performed by: ORTHOPAEDIC SURGERY

## 2024-08-28 PROCEDURE — 97161 PT EVAL LOW COMPLEX 20 MIN: CPT | Mod: GP

## 2024-08-28 PROCEDURE — 360N000077 HC SURGERY LEVEL 4, PER MIN: Performed by: ORTHOPAEDIC SURGERY

## 2024-08-28 PROCEDURE — 710N000010 HC RECOVERY PHASE 1, LEVEL 2, PER MIN: Performed by: ORTHOPAEDIC SURGERY

## 2024-08-28 PROCEDURE — 250N000013 HC RX MED GY IP 250 OP 250 PS 637: Performed by: FAMILY MEDICINE

## 2024-08-28 PROCEDURE — 258N000003 HC RX IP 258 OP 636

## 2024-08-28 DEVICE — LEGION PS NP NARROW FEMORAL SZ 6 RT
Type: IMPLANTABLE DEVICE | Site: KNEE | Status: FUNCTIONAL
Brand: LEGION

## 2024-08-28 DEVICE — GENESIS II NON-POROUS TIBIAL                                    BASEPLATE SIZE 5 RIGHT
Type: IMPLANTABLE DEVICE | Site: KNEE | Status: FUNCTIONAL
Brand: GENESIS II

## 2024-08-28 DEVICE — GENESIS II RESURFACING PATELLAR                                    PROSTHESIS  32MM
Type: IMPLANTABLE DEVICE | Site: KNEE | Status: FUNCTIONAL
Brand: GENESIS II

## 2024-08-28 DEVICE — LEGION POSTERIOR STABILIZED HIGH                                    FLEX HIGHLY CROSS LINKED                                    POLYETHYLENE SIZE 5-6 9MM
Type: IMPLANTABLE DEVICE | Site: KNEE | Status: FUNCTIONAL
Brand: LEGION

## 2024-08-28 DEVICE — SIMPLEX® HV WITH GENTAMICIN IS A FAST-SETTING ACRYLIC RESIN WITH ADDITION OF GENTAMICIN SULFATE FOR USE IN BONE SURGERY. MIXING THE TWO SEPARATE STERILE COMPONENTS PRODUCES A DUCTILE BONE CEMENT WHICH, AFTER HARDENING, FIXES THE IMPLANT AND TRANSFERS STRESSES PRODUCED DURING MOVEMENT EVENLY TO THE BONE. SIMPLEX® HV WITH GENTAMICINN CEMENT POWDER ALSO CONTAINS INSOLUBLE ZIRCONIUM DIOXIDE AS AN X-RAY CONTRAST MEDIUM. SIMPLEX® HV WITH GENTAMICIN DOES NOT EMIT A SIGNAL AND DOES NOT POSE A SAFETY RISK IN A MAGNETIC RESONANCE ENVIRONMENT.
Type: IMPLANTABLE DEVICE | Site: KNEE | Status: FUNCTIONAL
Brand: SIMPLEX HV WITH GENTAMICIN

## 2024-08-28 RX ORDER — SODIUM CHLORIDE, SODIUM LACTATE, POTASSIUM CHLORIDE, CALCIUM CHLORIDE 600; 310; 30; 20 MG/100ML; MG/100ML; MG/100ML; MG/100ML
INJECTION, SOLUTION INTRAVENOUS CONTINUOUS
Status: DISCONTINUED | OUTPATIENT
Start: 2024-08-28 | End: 2024-08-28

## 2024-08-28 RX ORDER — SCOLOPAMINE TRANSDERMAL SYSTEM 1 MG/1
1 PATCH, EXTENDED RELEASE TRANSDERMAL ONCE
Status: COMPLETED | OUTPATIENT
Start: 2024-08-28 | End: 2024-08-31

## 2024-08-28 RX ORDER — AMOXICILLIN 250 MG
1 CAPSULE ORAL 2 TIMES DAILY
Status: DISCONTINUED | OUTPATIENT
Start: 2024-08-28 | End: 2024-08-31 | Stop reason: HOSPADM

## 2024-08-28 RX ORDER — HYDROMORPHONE HCL IN WATER/PF 6 MG/30 ML
0.2 PATIENT CONTROLLED ANALGESIA SYRINGE INTRAVENOUS
Status: DISCONTINUED | OUTPATIENT
Start: 2024-08-28 | End: 2024-08-31 | Stop reason: HOSPADM

## 2024-08-28 RX ORDER — NICOTINE POLACRILEX 4 MG
15-30 LOZENGE BUCCAL
Status: DISCONTINUED | OUTPATIENT
Start: 2024-08-28 | End: 2024-08-31 | Stop reason: HOSPADM

## 2024-08-28 RX ORDER — FENTANYL CITRATE 50 UG/ML
25-100 INJECTION, SOLUTION INTRAMUSCULAR; INTRAVENOUS
Status: DISCONTINUED | OUTPATIENT
Start: 2024-08-28 | End: 2024-08-28

## 2024-08-28 RX ORDER — PANTOPRAZOLE SODIUM 40 MG/1
40 TABLET, DELAYED RELEASE ORAL
Status: DISCONTINUED | OUTPATIENT
Start: 2024-08-28 | End: 2024-08-31 | Stop reason: HOSPADM

## 2024-08-28 RX ORDER — DEXAMETHASONE SODIUM PHOSPHATE 4 MG/ML
4 INJECTION, SOLUTION INTRA-ARTICULAR; INTRALESIONAL; INTRAMUSCULAR; INTRAVENOUS; SOFT TISSUE
Status: DISCONTINUED | OUTPATIENT
Start: 2024-08-28 | End: 2024-08-28 | Stop reason: HOSPADM

## 2024-08-28 RX ORDER — BUPIVACAINE HYDROCHLORIDE 7.5 MG/ML
INJECTION, SOLUTION INTRASPINAL
Status: COMPLETED | OUTPATIENT
Start: 2024-08-28 | End: 2024-08-28

## 2024-08-28 RX ORDER — ONDANSETRON 4 MG/1
4 TABLET, ORALLY DISINTEGRATING ORAL EVERY 6 HOURS PRN
Status: DISCONTINUED | OUTPATIENT
Start: 2024-08-28 | End: 2024-08-31 | Stop reason: HOSPADM

## 2024-08-28 RX ORDER — NALOXONE HYDROCHLORIDE 0.4 MG/ML
0.2 INJECTION, SOLUTION INTRAMUSCULAR; INTRAVENOUS; SUBCUTANEOUS
Status: DISCONTINUED | OUTPATIENT
Start: 2024-08-28 | End: 2024-08-31 | Stop reason: HOSPADM

## 2024-08-28 RX ORDER — NALOXONE HYDROCHLORIDE 0.4 MG/ML
0.4 INJECTION, SOLUTION INTRAMUSCULAR; INTRAVENOUS; SUBCUTANEOUS
Status: DISCONTINUED | OUTPATIENT
Start: 2024-08-28 | End: 2024-08-31 | Stop reason: HOSPADM

## 2024-08-28 RX ORDER — BUPIVACAINE HYDROCHLORIDE 5 MG/ML
INJECTION, SOLUTION EPIDURAL; INTRACAUDAL
Status: COMPLETED | OUTPATIENT
Start: 2024-08-28 | End: 2024-08-28

## 2024-08-28 RX ORDER — OXYCODONE HYDROCHLORIDE 5 MG/1
5 TABLET ORAL EVERY 4 HOURS PRN
Status: DISCONTINUED | OUTPATIENT
Start: 2024-08-28 | End: 2024-08-28

## 2024-08-28 RX ORDER — FENTANYL CITRATE 50 UG/ML
25 INJECTION, SOLUTION INTRAMUSCULAR; INTRAVENOUS EVERY 5 MIN PRN
Status: DISCONTINUED | OUTPATIENT
Start: 2024-08-28 | End: 2024-08-28 | Stop reason: HOSPADM

## 2024-08-28 RX ORDER — OXYCODONE HYDROCHLORIDE 5 MG/1
10 TABLET ORAL EVERY 4 HOURS PRN
Status: DISCONTINUED | OUTPATIENT
Start: 2024-08-28 | End: 2024-08-28

## 2024-08-28 RX ORDER — HYDROMORPHONE HCL IN WATER/PF 6 MG/30 ML
0.4 PATIENT CONTROLLED ANALGESIA SYRINGE INTRAVENOUS EVERY 5 MIN PRN
Status: DISCONTINUED | OUTPATIENT
Start: 2024-08-28 | End: 2024-08-28 | Stop reason: HOSPADM

## 2024-08-28 RX ORDER — CEFAZOLIN SODIUM 2 G/100ML
2 INJECTION, SOLUTION INTRAVENOUS EVERY 8 HOURS
Status: COMPLETED | OUTPATIENT
Start: 2024-08-28 | End: 2024-08-29

## 2024-08-28 RX ORDER — LIDOCAINE HYDROCHLORIDE 10 MG/ML
INJECTION, SOLUTION INFILTRATION; PERINEURAL PRN
Status: DISCONTINUED | OUTPATIENT
Start: 2024-08-28 | End: 2024-08-28

## 2024-08-28 RX ORDER — BISACODYL 10 MG
10 SUPPOSITORY, RECTAL RECTAL DAILY PRN
Status: DISCONTINUED | OUTPATIENT
Start: 2024-08-28 | End: 2024-08-31 | Stop reason: HOSPADM

## 2024-08-28 RX ORDER — DEXTROSE MONOHYDRATE 25 G/50ML
25-50 INJECTION, SOLUTION INTRAVENOUS
Status: DISCONTINUED | OUTPATIENT
Start: 2024-08-28 | End: 2024-08-31 | Stop reason: HOSPADM

## 2024-08-28 RX ORDER — ONDANSETRON 4 MG/1
4 TABLET, ORALLY DISINTEGRATING ORAL EVERY 30 MIN PRN
Status: DISCONTINUED | OUTPATIENT
Start: 2024-08-28 | End: 2024-08-28 | Stop reason: HOSPADM

## 2024-08-28 RX ORDER — POLYETHYLENE GLYCOL 3350 17 G/17G
17 POWDER, FOR SOLUTION ORAL DAILY
Status: DISCONTINUED | OUTPATIENT
Start: 2024-08-29 | End: 2024-08-31 | Stop reason: HOSPADM

## 2024-08-28 RX ORDER — CEFAZOLIN SODIUM/WATER 2 G/20 ML
2 SYRINGE (ML) INTRAVENOUS
Status: COMPLETED | OUTPATIENT
Start: 2024-08-28 | End: 2024-08-28

## 2024-08-28 RX ORDER — PROPOFOL 10 MG/ML
INJECTION, EMULSION INTRAVENOUS PRN
Status: DISCONTINUED | OUTPATIENT
Start: 2024-08-28 | End: 2024-08-28

## 2024-08-28 RX ORDER — LIDOCAINE 40 MG/G
CREAM TOPICAL
Status: DISCONTINUED | OUTPATIENT
Start: 2024-08-28 | End: 2024-08-31 | Stop reason: HOSPADM

## 2024-08-28 RX ORDER — DEXAMETHASONE SODIUM PHOSPHATE 10 MG/ML
INJECTION, SOLUTION INTRAMUSCULAR; INTRAVENOUS PRN
Status: DISCONTINUED | OUTPATIENT
Start: 2024-08-28 | End: 2024-08-28

## 2024-08-28 RX ORDER — SODIUM CHLORIDE, SODIUM LACTATE, POTASSIUM CHLORIDE, CALCIUM CHLORIDE 600; 310; 30; 20 MG/100ML; MG/100ML; MG/100ML; MG/100ML
INJECTION, SOLUTION INTRAVENOUS CONTINUOUS
Status: DISCONTINUED | OUTPATIENT
Start: 2024-08-28 | End: 2024-08-28 | Stop reason: HOSPADM

## 2024-08-28 RX ORDER — SODIUM CHLORIDE, SODIUM LACTATE, POTASSIUM CHLORIDE, CALCIUM CHLORIDE 600; 310; 30; 20 MG/100ML; MG/100ML; MG/100ML; MG/100ML
INJECTION, SOLUTION INTRAVENOUS CONTINUOUS
Status: DISCONTINUED | OUTPATIENT
Start: 2024-08-28 | End: 2024-08-30

## 2024-08-28 RX ORDER — HYDROMORPHONE HCL IN WATER/PF 6 MG/30 ML
0.4 PATIENT CONTROLLED ANALGESIA SYRINGE INTRAVENOUS
Status: DISCONTINUED | OUTPATIENT
Start: 2024-08-28 | End: 2024-08-31 | Stop reason: HOSPADM

## 2024-08-28 RX ORDER — ACETAMINOPHEN 325 MG/1
650 TABLET ORAL EVERY 4 HOURS PRN
Status: DISCONTINUED | OUTPATIENT
Start: 2024-08-31 | End: 2024-08-30

## 2024-08-28 RX ORDER — PROPOFOL 10 MG/ML
INJECTION, EMULSION INTRAVENOUS CONTINUOUS PRN
Status: DISCONTINUED | OUTPATIENT
Start: 2024-08-28 | End: 2024-08-28

## 2024-08-28 RX ORDER — ASPIRIN 81 MG/1
81 TABLET ORAL 2 TIMES DAILY
Status: DISCONTINUED | OUTPATIENT
Start: 2024-08-28 | End: 2024-08-30

## 2024-08-28 RX ORDER — ONDANSETRON 2 MG/ML
4 INJECTION INTRAMUSCULAR; INTRAVENOUS EVERY 6 HOURS PRN
Status: DISCONTINUED | OUTPATIENT
Start: 2024-08-28 | End: 2024-08-31 | Stop reason: HOSPADM

## 2024-08-28 RX ORDER — CEFAZOLIN SODIUM/WATER 2 G/20 ML
2 SYRINGE (ML) INTRAVENOUS SEE ADMIN INSTRUCTIONS
Status: DISCONTINUED | OUTPATIENT
Start: 2024-08-28 | End: 2024-08-31 | Stop reason: HOSPADM

## 2024-08-28 RX ORDER — ACETAMINOPHEN 325 MG/1
975 TABLET ORAL EVERY 8 HOURS
Status: DISCONTINUED | OUTPATIENT
Start: 2024-08-28 | End: 2024-08-29

## 2024-08-28 RX ORDER — ACETAMINOPHEN 325 MG/1
975 TABLET ORAL ONCE
Status: COMPLETED | OUTPATIENT
Start: 2024-08-28 | End: 2024-08-28

## 2024-08-28 RX ORDER — ONDANSETRON 2 MG/ML
4 INJECTION INTRAMUSCULAR; INTRAVENOUS EVERY 30 MIN PRN
Status: DISCONTINUED | OUTPATIENT
Start: 2024-08-28 | End: 2024-08-28 | Stop reason: HOSPADM

## 2024-08-28 RX ORDER — TRANEXAMIC ACID 650 MG/1
1950 TABLET ORAL ONCE
Status: COMPLETED | OUTPATIENT
Start: 2024-08-28 | End: 2024-08-28

## 2024-08-28 RX ORDER — ONDANSETRON 2 MG/ML
INJECTION INTRAMUSCULAR; INTRAVENOUS PRN
Status: DISCONTINUED | OUTPATIENT
Start: 2024-08-28 | End: 2024-08-28

## 2024-08-28 RX ORDER — PROCHLORPERAZINE MALEATE 5 MG
5 TABLET ORAL EVERY 6 HOURS PRN
Status: DISCONTINUED | OUTPATIENT
Start: 2024-08-28 | End: 2024-08-31 | Stop reason: HOSPADM

## 2024-08-28 RX ORDER — CALCIUM CARBONATE/VITAMIN D3 600 MG-10
1 TABLET ORAL DAILY
COMMUNITY

## 2024-08-28 RX ORDER — HYDROMORPHONE HCL IN WATER/PF 6 MG/30 ML
0.2 PATIENT CONTROLLED ANALGESIA SYRINGE INTRAVENOUS EVERY 5 MIN PRN
Status: DISCONTINUED | OUTPATIENT
Start: 2024-08-28 | End: 2024-08-28 | Stop reason: HOSPADM

## 2024-08-28 RX ORDER — FENTANYL CITRATE 50 UG/ML
50 INJECTION, SOLUTION INTRAMUSCULAR; INTRAVENOUS EVERY 5 MIN PRN
Status: DISCONTINUED | OUTPATIENT
Start: 2024-08-28 | End: 2024-08-28 | Stop reason: HOSPADM

## 2024-08-28 RX ORDER — NALOXONE HYDROCHLORIDE 0.4 MG/ML
0.1 INJECTION, SOLUTION INTRAMUSCULAR; INTRAVENOUS; SUBCUTANEOUS
Status: DISCONTINUED | OUTPATIENT
Start: 2024-08-28 | End: 2024-08-28 | Stop reason: HOSPADM

## 2024-08-28 RX ADMIN — PANTOPRAZOLE SODIUM 40 MG: 40 TABLET, DELAYED RELEASE ORAL at 16:46

## 2024-08-28 RX ADMIN — BUPIVACAINE HYDROCHLORIDE 15 ML: 5 INJECTION, SOLUTION EPIDURAL; INTRACAUDAL; PERINEURAL at 08:46

## 2024-08-28 RX ADMIN — ACETAMINOPHEN 975 MG: 325 TABLET ORAL at 07:29

## 2024-08-28 RX ADMIN — LIDOCAINE HYDROCHLORIDE 5 ML: 10 INJECTION, SOLUTION INFILTRATION; PERINEURAL at 08:58

## 2024-08-28 RX ADMIN — MIDAZOLAM HYDROCHLORIDE 1 MG: 1 INJECTION, SOLUTION INTRAMUSCULAR; INTRAVENOUS at 08:45

## 2024-08-28 RX ADMIN — BUPIVACAINE HYDROCHLORIDE IN DEXTROSE 1.6 ML: 7.5 INJECTION, SOLUTION SUBARACHNOID at 08:56

## 2024-08-28 RX ADMIN — PROPOFOL 40 MG: 10 INJECTION, EMULSION INTRAVENOUS at 09:10

## 2024-08-28 RX ADMIN — SODIUM CHLORIDE, POTASSIUM CHLORIDE, SODIUM LACTATE AND CALCIUM CHLORIDE: 600; 310; 30; 20 INJECTION, SOLUTION INTRAVENOUS at 07:59

## 2024-08-28 RX ADMIN — PROPOFOL 100 MCG/KG/MIN: 10 INJECTION, EMULSION INTRAVENOUS at 09:10

## 2024-08-28 RX ADMIN — DEXAMETHASONE SODIUM PHOSPHATE 10 MG: 10 INJECTION, SOLUTION INTRAMUSCULAR; INTRAVENOUS at 09:11

## 2024-08-28 RX ADMIN — SODIUM CHLORIDE, POTASSIUM CHLORIDE, SODIUM LACTATE AND CALCIUM CHLORIDE: 600; 310; 30; 20 INJECTION, SOLUTION INTRAVENOUS at 15:51

## 2024-08-28 RX ADMIN — SCOPOLAMINE 1 PATCH: 1 SYSTEM TRANSDERMAL at 08:48

## 2024-08-28 RX ADMIN — SENNOSIDES AND DOCUSATE SODIUM 1 TABLET: 50; 8.6 TABLET ORAL at 21:10

## 2024-08-28 RX ADMIN — ACETAMINOPHEN 975 MG: 325 TABLET ORAL at 21:11

## 2024-08-28 RX ADMIN — ASPIRIN 81 MG: 81 TABLET, COATED ORAL at 21:10

## 2024-08-28 RX ADMIN — PROPOFOL 20 MG: 10 INJECTION, EMULSION INTRAVENOUS at 09:21

## 2024-08-28 RX ADMIN — Medication 2 G: at 08:55

## 2024-08-28 RX ADMIN — CEFAZOLIN SODIUM 2 G: 2 INJECTION, SOLUTION INTRAVENOUS at 16:46

## 2024-08-28 RX ADMIN — SODIUM CHLORIDE, POTASSIUM CHLORIDE, SODIUM LACTATE AND CALCIUM CHLORIDE: 600; 310; 30; 20 INJECTION, SOLUTION INTRAVENOUS at 10:35

## 2024-08-28 RX ADMIN — HYDROMORPHONE HYDROCHLORIDE 0.4 MG: 0.2 INJECTION, SOLUTION INTRAMUSCULAR; INTRAVENOUS; SUBCUTANEOUS at 21:12

## 2024-08-28 RX ADMIN — TRANEXAMIC ACID 1950 MG: 650 TABLET ORAL at 07:29

## 2024-08-28 RX ADMIN — DEXMEDETOMIDINE HYDROCHLORIDE 16 MCG: 100 INJECTION, SOLUTION INTRAVENOUS at 08:58

## 2024-08-28 RX ADMIN — FENTANYL CITRATE 50 MCG: 50 INJECTION, SOLUTION INTRAMUSCULAR; INTRAVENOUS at 08:46

## 2024-08-28 RX ADMIN — ONDANSETRON 4 MG: 2 INJECTION INTRAMUSCULAR; INTRAVENOUS at 08:55

## 2024-08-28 RX ADMIN — PROPOFOL 20 MG: 10 INJECTION, EMULSION INTRAVENOUS at 09:03

## 2024-08-28 RX ADMIN — PROPOFOL 20 MG: 10 INJECTION, EMULSION INTRAVENOUS at 09:06

## 2024-08-28 ASSESSMENT — ACTIVITIES OF DAILY LIVING (ADL)
ADLS_ACUITY_SCORE: 30
ADLS_ACUITY_SCORE: 29
ADLS_ACUITY_SCORE: 29
ADLS_ACUITY_SCORE: 31
ADLS_ACUITY_SCORE: 30
ADLS_ACUITY_SCORE: 29
ADLS_ACUITY_SCORE: 30
ADLS_ACUITY_SCORE: 29
ADLS_ACUITY_SCORE: 30

## 2024-08-28 NOTE — ANESTHESIA POSTPROCEDURE EVALUATION
Patient: Patricia Mckeon    Procedure: Procedure(s):  RIGHT TOTAL KNEE ARTHROPLASTY       Anesthesia Type:  Spinal    Note:  Disposition: Inpatient   Postop Pain Control: Uneventful            Sign Out: Well controlled pain   PONV: No   Neuro/Psych: Uneventful            Sign Out: Acceptable/Baseline neuro status   Airway/Respiratory: Uneventful            Sign Out: Acceptable/Baseline resp. status   CV/Hemodynamics: Uneventful            Sign Out: Acceptable CV status; No obvious hypovolemia; No obvious fluid overload   Other NRE:    DID A NON-ROUTINE EVENT OCCUR? No           Last vitals:  Vitals Value Taken Time   /76 08/28/24 1230   Temp 36.6  C (97.88  F) 08/28/24 1157   Pulse 85 08/28/24 1237   Resp 29 08/28/24 1155   SpO2 95 % 08/28/24 1237   Vitals shown include unfiled device data.    Electronically Signed By: Jody Correa MD  August 28, 2024  12:39 PM

## 2024-08-28 NOTE — PHARMACY-ADMISSION MEDICATION HISTORY
Pharmacist ALBERTO Medication History    Admission medication history is complete. The information provided in this note is only as accurate as the sources available at the time of the update.    Medication reconciliation/reorder completed by provider prior to medication history? No    Information Source(s): Patient and Clinic records and Care Everywhere via in-person    Pertinent Information: N/A    Allergies reviewed with patient and updates made in EHR: yes    Medications available for use during hospital stay: None.      Medication History Completed By: Zachery Ceron [facility] 8/28/2024 7:51 AM    PTA Med List   Medication Sig Last Dose    acetaminophen (TYLENOL 8 HOUR ARTHRITIS PAIN) 650 MG CR tablet Take 650 mg by mouth every 8 hours as needed for mild pain Unknown    aspirin 81 MG EC tablet Take 81 mg by mouth daily. 8/21/2024 at AM    calcium carbonate-vitamin D (CALTRATE) 600-10 MG-MCG per tablet Take 1 tablet by mouth daily. 8/21/2024 at AM    cholecalciferol (VITAMIN D3) 125 mcg (5000 units) capsule Take 1 capsule by mouth daily 8/21/2024 at AM    garlic 150 MG TABS tablet Take 150 mg by mouth daily. 8/21/2024 at AM    metFORMIN (GLUCOPHAGE XR) 500 MG 24 hr tablet Take 1 tablet (500 mg) by mouth daily with food 8/27/2024 at AM    Omega-3 Fatty Acids (FISH OIL) 1360 MG CAPS Take 1 capsule by mouth daily. 8/21/2024 at AM    omeprazole (PRILOSEC) 20 MG capsule Take 20 mg by mouth daily. 8/28/2024 at AM    phentermine (ADIPEX-P) 37.5 MG tablet Take 37.5 mg by mouth every morning (before breakfast). 8/21/2024 at AM    vitamin C (ASCORBIC ACID) 1000 MG TABS Take 1,000 mg by mouth daily 8/21/2024 at AM

## 2024-08-28 NOTE — ANESTHESIA PROCEDURE NOTES
"Intrathecal injection Procedure Note    Pre-Procedure   Staff -        Anesthesiologist:  Jody Correa MD       Performed By: anesthesiologist       Location: OR       Procedure Start/Stop Times: 8/28/2024 8:56 AM and 8/28/2024 9:09 AM       Pre-Anesthestic Checklist: patient identified, IV checked, risks and benefits discussed, informed consent, monitors and equipment checked, pre-op evaluation and at physician/surgeon's request  Timeout:       Correct Patient: Yes        Correct Procedure: Yes        Correct Site: Yes        Correct Position: Yes   Procedure Documentation  Procedure: intrathecal injection       Patient Position: sitting       Patient Prep/Sterile Barriers: sterile gloves, mask, patient draped       Skin prep: Chloraprep       Insertion Site: L3-4. (right paramedian approach).       Needle Gauge: 24.        Needle Length (Inches): 4        Spinal Needle Type: Pencan       Introducer used       # of attempts: 1 and  # of redirects:     Assessment/Narrative         Paresthesias: No.       CSF fluid: clear.    Medication(s) Administered   0.75% Hyperbaric Bupivacaine (Intrathecal) - Intrathecal   1.6 mL - 8/28/2024 8:56:00 AM  Medication Administration Time: 8/28/2024 8:56 AM     Comments:  Unsuccessful attempts at L3/4 ad L2/3 midline.  Successful at L3/4 R paramedian.  Patient tolerated well.      FOR [facility] ([facility]) ONLY:   Pain Team Contact information: please page the Pain Team Via [employer]. Search \"Pain\". During daytime hours, please page the attending first. At night please page the resident first.      "

## 2024-08-28 NOTE — INTERVAL H&P NOTE
I have reviewed the virtual H&P that is linked to this encounter. The physical exam performed by anesthesia during this surgical encounter serves as the physical portion of that the virtual H&P. There are no significant changes from that history and physical as noted.    Clinical Conditions Present on Arrival:  Clinically Significant Risk Factors Present on Admission                 # Drug Induced Platelet Defect: home medication list includes an antiplatelet medication

## 2024-08-28 NOTE — PROGRESS NOTES
Occupational Therapy: Orders received. Chart reviewed and discussed with PT.? Occupational Therapy not indicated due to pt having history of multiple hip/knee surgeries, good home setup/support from family, has all necessary DME, and pt is Mod I w/ all ADLs, per PT.? Defer discharge recommendations to ortho team.? Will complete orders.

## 2024-08-28 NOTE — OP NOTE
DATE OF SERVICE: 8/28/2024     PREOPERATIVE DIAGNOSIS: Right knee osteoarthritis.                                                       Limited range of motion right knee    POSTOPERATIVE DIAGNOSIS: Right knee osteoarthritis.     Operation : Right Total Knee Arthroplasty    ANESTHESIA: Spinal .     PREP: Routine.     SURGEON: Lauro Mcgee MD.     ASSISTANT: Harmeet Valles PA-C.     INDICATION: This patient is a  63 year old year-old woman with osteoarthritis of her right knee.   they has failed nonsurgical treatment. Risks and benefits were discussed regarding   surgical treatment.     The patient was brought to the operating room, placed supine, given a spinal   anesthetic, was given perioperative antibiotics.right leg was elevated, prepped   and draped in normal sterile fashion. Appropriate timeout was completed.   Tourniquet was inflated.    Findings: Significant external rotation deformity at believe of her hip more than her knee.  She had flexion limited to about 80 degrees and a contracture of 10 degrees from extension.  Osteoporotic bone.  Significant amount of posterior bone that was excised.    An incision was made just above the patella in tibial tubercle. Medial retinacular approach  then completed. She had large effusion which was evacuated. On inspection she had   grade 4 changes in the medial and patellofemoral compartments. Osteophytes were   excised.  She had a very contracted knee.  I did try to elevate soft tissue and scarring from the suprasellar pouch with use of a Alvarado.  Next, after retractor was placed, a drill hole was placed in the distal   femur. Intramedullary guide was used and a 5 degree valgus cut of 10 mm of bone was   created. A sizing guide was placed. A size 6 implant was felt to be adequate in 3   degrees external rotation. Anterior, posterior and chamfer cuts were completed.  I then completed a box cut for a posterior stabilized knee.  Next, attention was brought to the tibial  side. Using external alignment guide, 4   mm of medial bone and 6 mm of lateral bone resected.  Excised PCL. Meniscal   tissue was excised.   With a size 5 tibia, size 6N  femur and a 9 PS mm insert the patient had full   extension with matched flexion and extension gaps. Alignment rods were used in   appropriate varus valgus.  I was able to flex her to approximately 115 degrees.  Next, the patella was then cut at 32 mm with excellent patellofemoral tracking.   At this point in time the trial implants were removed. The tibia and femur were   prepared. After copious irrigation and drying using Simplex cement, the size 5  Smith and Nephew baseplate was impacted. A size 9 PS insert was impacted, the size   6n PS femur was impacted, knee brought into extension, patella cemented, cement   cured. No problems identified. Knee was then copiously irrigated, infiltrated with   remaining part of the orthopedic injection. The tourniquet was released and bleeding controlled.The capsule closed  with 1 Vicryl suture, 2-0 Vicryl and Monocryl stitch. The knee was then injected with TXA.  Sterile compressive dressing.   The patient was then brought to recovery in stable condition. All sponge and   needle counts correct. No intraoperative complications identified. Blood loss was   approximately  25 mL. No specimens sent.    ROCÍO SCHWARTZ MD   8/28/2024

## 2024-08-28 NOTE — PROGRESS NOTES
08/28/24 1400   Appointment Info   Signing Clinician's Name / Credentials (PT) Emily Wilkins, PT, DPT   Quick Adds   Quick Adds Certification   Living Environment   People in Home spouse   Current Living Arrangements house   Home Accessibility stairs to enter home;stairs within home   Number of Stairs, Main Entrance 2   Stair Railings, Main Entrance railing on left side (ascending)   Number of Stairs, Within Home, Primary seven   Stair Railings, Within Home, Primary railing on left side (ascending)   Self-Care   Usual Activity Tolerance good   Current Activity Tolerance moderate   Equipment Currently Used at Home cane, straight  (also owns w/c and FWW)   Fall history within last six months no   General Information   Onset of Illness/Injury or Date of Surgery 08/28/24   Referring Physician Lauro Mcgee MD   Patient/Family Therapy Goals Statement (PT) walk more   Pertinent History of Current Problem (include personal factors and/or comorbidities that impact the POC) s/p R TKA, h/p THAs   Existing Precautions/Restrictions no known precautions/restrictions   Weight-Bearing Status - LLE full weight-bearing   Weight-Bearing Status - RLE weight-bearing as tolerated   Range of Motion (ROM)   ROM Comment decreased ROM s/p R TKA   Strength (Manual Muscle Testing)   Strength Comments decreased strength s/p R TKA   Bed Mobility   Bed Mobility supine-sit   Supine-Sit [place] (Bed Mobility) contact guard;verbal cues   Transfers   Transfers sit-stand transfer   Sit-Stand Transfer   Sit-Stand [place] (Transfers) contact guard;verbal cues   Assistive Device (Sit-Stand Transfers) walker, front-wheeled   Gait/Stairs (Locomotion)   [place] Level (Gait) contact guard;verbal cues   Assistive Device (Gait) walker, front-wheeled   Distance in Feet (Gait) 10'   Pattern (Gait) step-to   Deviations/Abnormal Patterns (Gait) antalgic;florin decreased   Clinical Impression   Criteria for Skilled Therapeutic Intervention  Yes, treatment indicated   PT Diagnosis (PT) impaired functional mobility   Influenced by the following impairments pain, decreased ROM, impaired balance, decreased strength   Functional limitations due to impairments gait, stairs, transfers   Clinical Presentation (PT Evaluation Complexity) stable   Clinical Presentation Rationale pt presents as medically diagnosed   Clinical Decision Making (Complexity) low complexity   Planned Therapy Interventions (PT) balance training;bed mobility training;gait training;home exercise program;patient/family education;ROM (range of motion);stair training;strengthening;transfer training   Risk & Benefits of therapy have been explained care plan/treatment goals reviewed;patient   PT Total Evaluation Time   PT Eval, Low Complexity Minutes (23678) 10   Therapy Certification   Start of care date 08/28/24   Certification date from 08/28/24   Certification date to 09/28/24   Medical Diagnosis OA   Physical Therapy Goals   PT Frequency Daily   PT Predicted Duration/Target Date for Goal Attainment 08/29/24   PT Goals PT Goal 1;Gait;Transfers;Stairs   PT: Transfers Modified independent;Sit to/from stand;Assistive device   PT: Gait Modified independent;Rolling walker;100 feet   PT: Stairs 7 stairs;Supervision/stand-by assist;Rail on both sides   PT: Goal 1 Independent with written HEP for LE strengthening and ROM   Interventions   Interventions Quick Adds Therapeutic Procedure;Therapeutic Activity;Gait Training   Therapeutic Procedure/Exercise   Ther. Procedure: strength, endurance, ROM, flexibillity Minutes (02805) 15   Treatment Detail/Skilled Intervention TKA protocol therex x10 reps, cueing for technique, Independent with written HEP following education   Therapeutic Activity   Therapeutic Activities: dynamic activities to improve functional performance Minutes (19941) 13   Treatment Detail/Skilled Intervention supine to sit, CGA, cueing for LE advancement to EOB, sit to/from stand,  cueing for safe hand placement and LE positioning, CGA, x2 reps from bed and toilet, standing balance x4 min for akiko cares and washing hands, decreased WB through R LE, steady with intermittent UE support, CGA   Gait Training   Symptoms Noted During/After Treatment (Gait Training) none   Treatment Detail/Skilled Intervention cueing for posture, patterning and safety   Distance in Feet 2x10'   [place] Level (Gait Training) contact guard   Physical Assistance Level (Gait Training) supervision;verbal cues   Weight Bearing (Gait Training) weight-bearing as tolerated   Assistive Device (Gait Training) rolling walker   Pattern Analysis (Gait Training) swing-to gait   Gait Analysis Deviations decreased florin;decreased step length;decreased stride length   PT Discharge Planning   PT Plan gait, stairs, HEP   PT Discharge Recommendation (DC Rec) other (see comments)  (defer to ortho)   PT Rationale for DC Rec home with assist from spouse as needed   PT Brief overview of current status ambulated to/from bathroom with FWW, CGA   PT Equipment Needed at Discharge cane, straight;walker, rolling   Total Session Time   Timed Code Treatment Minutes 28   Total Session Time (sum of timed and untimed services) 38     [facility]  OUTPATIENT PHYSICAL THERAPY EVALUATION  PLAN OF TREATMENT FOR OUTPATIENT REHABILITATION  (COMPLETE FOR INITIAL CLAIMS ONLY)  Patient's Last Name, First Name, M.I.  YOB: 1960  Patricia Mckeon                        Provider's Name  [facility] Medical Record No.  [id number]                             Onset Date:  08/28/24   Start of Care Date:  08/28/24   Type:     _X_PT   ___OT   ___SLP Medical Diagnosis:  OA              PT Diagnosis:  impaired functional mobility Visits from SOC:  1     See note for plan of treatment, functional goals and certification details    I CERTIFY THE NEED FOR THESE SERVICES FURNISHED UNDER         THIS PLAN OF TREATMENT AND WHILE UNDER MY CARE     (Physician co-signature of this document indicates review and certification of the therapy plan).

## 2024-08-28 NOTE — ANESTHESIA CARE TRANSFER NOTE
Patient: Patricia Mckeon    Procedure: Procedure(s):  RIGHT TOTAL KNEE ARTHROPLASTY       Diagnosis: Osteoarthritis [M19.90]  Diagnosis Additional Information: No value filed.    Anesthesia Type:   Spinal     Note:    Oropharynx: oropharynx clear of all foreign objects and spontaneously breathing  Level of Consciousness: awake  Oxygen Supplementation: face mask  Level of Supplemental Oxygen (L/min / FiO2): 6  Independent Airway: airway patency satisfactory and stable  Dentition: dentition unchanged  Vital Signs Stable: post-procedure vital signs reviewed and stable  Report to RN Given: handoff report given  Patient transferred to: PACU    Handoff Report: Identifed the Patient, Identified the Reponsible Provider, Reviewed the pertinent medical history, Discussed the surgical course, Reviewed Intra-OP anesthesia mangement and issues during anesthesia, Set expectations for post-procedure period and Allowed opportunity for questions and acknowledgement of understanding      Vitals:  Vitals Value Taken Time   /58 08/28/24 1100   Temp 36.7  C (98.06  F) 08/28/24 1102   Pulse 88 08/28/24 1102   Resp 16 08/28/24 1102   SpO2 95 % 08/28/24 1102   Vitals shown include unfiled device data.    Electronically Signed By: SHEFALI Thomas CRNA  August 28, 2024  11:03 AM

## 2024-08-28 NOTE — PROVIDER NOTIFICATION
Notified GAURANG Pang of new First Degree AV Block. Block was present in ALBERTO, new from last ECG in 2023. Dr. Correa saw patient at bedside and compared ALBERTO and PACU cardiac rhythm strips. No new developments since ALBERTO, no new orders at this time.

## 2024-08-28 NOTE — ANESTHESIA PREPROCEDURE EVALUATION
Anesthesia Pre-Procedure Evaluation    Patient: Patricia Mckeon   MRN: [id number] : 1960        Procedure : Procedure(s):  RIGHT TOTAL KNEE ARTHROPLASTY          Past Medical History:   Diagnosis Date    Dyslipidemia     Gastroesophageal reflux disease without esophagitis     Heart murmur     Motion sickness     Obese     Osteoarthritis     knees and hips needing replacement    PONV (postoperative nausea and vomiting)     Urinary urgency       Past Surgical History:   Procedure Laterality Date    ARTHROPLASTY HIP Left 2016    ARTHROPLASTY HIP ANTERIOR Right 2023    Procedure: RIGHT TOTAL HIP ARTHROPLASTY DIRECT ANTERIOR APPROACH;  Surgeon: Lauro Mcgee MD;  Location: [facility] Main OR    CHOLECYSTECTOMY      HAND SURGERY Right     ganglion removed from middle finger    HEEL SPUR SURGERY Right     LASIK      WISDOM TOOTH EXTRACTION        Allergies   Allergen Reactions    Oxycodone-Acetaminophen Itching      Social History     Tobacco Use    Smoking status: Never     Passive exposure: Never    Smokeless tobacco: Never   Substance Use Topics    Alcohol use: Yes     Comment: special occasions only      Wt Readings from Last 1 Encounters:   24 112.5 kg (248 lb)        Anesthesia Evaluation   Pt has had prior anesthetic.     History of anesthetic complications  - PONV.      ROS/MED HX  ENT/Pulmonary:  - neg pulmonary ROS     Neurologic:  - neg neurologic ROS     Cardiovascular:     (+) Dyslipidemia - -   -  - -                                      METS/Exercise Tolerance:     Hematologic:  - neg hematologic  ROS     Musculoskeletal:       GI/Hepatic:     (+) GERD, Asymptomatic on medication,                  Renal/Genitourinary:  - neg Renal ROS     Endo:     (+)               Obesity,       Psychiatric/Substance Use:  - neg psychiatric ROS     Infectious Disease:       Malignancy:       Other:      (+)  , H/O Chronic Pain,         Physical Exam    Airway        Mallampati: II   TM  "distance: > 3 FB   Neck ROM: full   Mouth opening: > 3 cm    Respiratory Devices and Support         Dental     Comment: Good condition        Cardiovascular          Rhythm and rate: regular and normal     Pulmonary           breath sounds clear to auscultation           OUTSIDE LABS:  CBC:   Lab Results   Component Value Date    WBC 7.3 08/21/2024    WBC 8.0 06/08/2023    HGB 14.5 08/21/2024    HGB 12.0 06/29/2023    HCT 45.3 08/21/2024    HCT 43.3 06/08/2023     08/21/2024     06/08/2023     BMP:   Lab Results   Component Value Date     08/21/2024     06/08/2023    POTASSIUM 4.7 08/21/2024    POTASSIUM 4.4 06/08/2023    CHLORIDE 105 08/21/2024    CHLORIDE 103 06/08/2023    CO2 20 (L) 08/21/2024    CO2 24 06/08/2023    BUN 12.0 08/21/2024    BUN 19.4 06/08/2023    CR 0.57 08/21/2024    CR 0.77 06/08/2023    GLC 89 08/21/2024     (H) 06/29/2023     COAGS: No results found for: \"PTT\", \"INR\", \"FIBR\"  POC: No results found for: \"BGM\", \"HCG\", \"HCGS\"  HEPATIC:   Lab Results   Component Value Date    ALBUMIN 4.5 06/08/2023    PROTTOTAL 7.5 06/08/2023    ALT 17 06/08/2023    AST 20 06/08/2023    ALKPHOS 128 (H) 06/08/2023    BILITOTAL 0.4 06/08/2023     OTHER:   Lab Results   Component Value Date    A1C 5.1 10/06/2022    RAJANI 9.2 08/21/2024    TSH 2.16 10/06/2022       Anesthesia Plan    ASA Status:  3       Anesthesia Type: Spinal.              Consents    Anesthesia Plan(s) and associated risks, benefits, and realistic alternatives discussed. Questions answered and patient/representative(s) expressed understanding.     - Discussed: Risks, Benefits and Alternatives for BOTH SEDATION and the PROCEDURE were discussed     - Discussed with:  Patient            Postoperative Care    Pain management: Peripheral nerve block (Single Shot).        Comments:    Other Comments: Consented for AC block for postop pain control.           Jody Correa MD    I have reviewed the pertinent notes and " labs in the chart from the past 30 days and (re)examined the patient.  Any updates or changes from those notes are reflected in this note.             # Drug Induced Platelet Defect: home medication list includes an antiplatelet medication

## 2024-08-28 NOTE — H&P
[facility] MEDICINE CONSULT     Assessment & Plan     I agree with the documentation and findings as written by Sandrita Campos and our discussed and formulated assessment and plan. I was present with Sandrita Campos who participated in the service and documentation of the note. I have also personally verified the history and personally performed the physical exam and medical decision-making. I agree with the assessment and plan of care as documented in the note.     Halima Cardoso MD  [facility] Medicine  [facility]  Phone: #[phone]     LUI Mujica-S  [facility] Medicine    Patricia Mckeon is a 63 year old old female with history of Osteoarthritis, GERD, Dyslipidemia, Obesity, and Overactive Bladder, who presents to the PACU postoperatively for a right total knee arthroplasty.  Right knee pain is stable.  Hemodynamically stable postoperatively.    GERD  Stable and asymptomatic  Resume PPI    Moderate obesity    Modification of lifestyle for weight loss  Phentermine 37.5 mg daily, on hold  Metformin 500 mg daily, on hold    Status post right total knee arthroplasty  Resume routine postoperative care  Resume routine postoperative care  Physical and Occupational Therapy  Use incentive spirometry frequently  DVT prophylaxis per orthopedics, aspirin 81 mg twice a day  Pain control with Tylenol 975 mg every 8 hours, 650 mg every 4 hours as needed, oxycodone 5 to 10 mg every 4 hours as needed, IV Dilaudid 0.2 to 0.4 mg every 2 hours as needed      Clinically Significant Risk Factors Present on Admission                # Drug Induced Platelet Defect: home medication list includes an antiplatelet medication                    DVTP: Mechanical Prophylaxis/ Sequential Compression Devices, resume aspirin postoperatively   Code Status: Prior  Disposition: Inpatient   Expected LOS: 1 day  Goals for the hospitalization: post operative recovery   Disposition Plan      Expected  Discharge Date: 08/29/2024                Chief Complaint Right total knee arthroplasty      HISTORY     Patricia Mckeon is a 63 year old old female with a history of Osteoarthritis, GERD, Dyslipidemia, Obesity, and Overactive Bladder, who presents to the PACU postoperatively for a right total knee arthroplasty. She has had knee pain for many years. She was a  and recently retired, so was now able to get her knee replaced. She notes osteoarthritis of her knees, feet, hips, and shoulders. Surgical history of left and right hip replacements, cholecystectomy, hand surgery, and heel spur surgery. She takes omeprazole for GERD, oxybutynin for urinary urgency, and metformin and phentermine for weight loss. No prior or current diagnosis of Diabetes Mellitus. She also takes daily multivitamins. Patient notes she stopped all medication and vitamins 1 week prior to her surgery, except for the metformin and omeprazole. Her mother had a history of multiple myeloma and her father had a history of lung cancer; both passed due to their cancers.  She does not have history of heart disease, lung disease, bleeding or clotting disorder, sleep apnea.  Preop physical is reviewed.    Patient is feeling well postoperatively. No knee pain, but she notes some shoulder pain due to her osteoarthritis. No N/V, chest pain, SOB, or lightheadedness. No concerns.     Per nursing note, new First Degree AV Block present in ALBERTO, new from last EKG in 2023. Dr. Correa compared ALBERTO and PACU cardiac rhythm strips; no new developments since ALBERTO.     Past Medical History     Past Medical History:  No date: Dyslipidemia  No date: Gastroesophageal reflux disease without esophagitis  No date: Heart murmur  No date: Motion sickness  No date: Obese  No date: Osteoarthritis      Comment:  knees and hips needing replacement  No date: PONV (postoperative nausea and vomiting)  No date: Urinary urgency     Surgical History     Past Surgical History:    Procedure Laterality Date     ARTHROPLASTY HIP Left 2016     ARTHROPLASTY HIP ANTERIOR Right 06/28/2023    Procedure: RIGHT TOTAL HIP ARTHROPLASTY DIRECT ANTERIOR APPROACH;  Surgeon: Lauro Mcgee MD;  Location: [facility] Main OR     CHOLECYSTECTOMY       HAND SURGERY Right     ganglion removed from middle finger     HEEL SPUR SURGERY Right      LASIK       WISDOM TOOTH EXTRACTION       Family History    Reviewed, and   Family History   Problem Relation Age of Onset     Multiple myeloma Mother      Obesity Father      Lung Cancer Father      Osteoarthritis Sister      Osteoarthritis Sister      Osteoarthritis Brother      Cancer Maternal Grandmother      Lung Cancer Paternal Grandmother      No Known Problems Son      No Known Problems Daughter          Social History      Social History     Tobacco Use     Smoking status: Never     Passive exposure: Never     Smokeless tobacco: Never   Vaping Use     Vaping status: Never Used   Substance Use Topics     Alcohol use: Yes     Comment: special occasions only     Drug use: Not Currently     Types: Marijuana     Comment: gummies      Allergies     Allergies   Allergen Reactions     Oxycodone-Acetaminophen Itching     Prior to Admission Medications      Prior to Admission Medications   Prescriptions Last Dose Informant Patient Reported? Taking?   Omega-3 Fatty Acids (FISH OIL) 1360 MG CAPS 8/21/2024 at AM  Yes Yes   Sig: Take 1 capsule by mouth daily.   acetaminophen (TYLENOL 8 HOUR ARTHRITIS PAIN) 650 MG CR tablet Unknown  Yes Yes   Sig: Take 650 mg by mouth every 8 hours as needed for mild pain   aspirin 81 MG EC tablet 8/21/2024 at AM  Yes Yes   Sig: Take 81 mg by mouth daily.   calcium carbonate-vitamin D (CALTRATE) 600-10 MG-MCG per tablet 8/21/2024 at AM  Yes Yes   Sig: Take 1 tablet by mouth daily.   cholecalciferol (VITAMIN D3) 125 mcg (5000 units) capsule 8/21/2024 at AM  Yes Yes   Sig: Take 1 capsule by mouth daily   garlic 150 MG TABS tablet  8/21/2024 at AM  Yes Yes   Sig: Take 150 mg by mouth daily.   metFORMIN (GLUCOPHAGE XR) 500 MG 24 hr tablet 8/27/2024 at AM  No Yes   Sig: Take 1 tablet (500 mg) by mouth daily with food   omeprazole (PRILOSEC) 20 MG capsule 8/28/2024 at AM  Yes Yes   Sig: Take 20 mg by mouth daily.   phentermine (ADIPEX-P) 37.5 MG tablet 8/21/2024 at AM  Yes Yes   Sig: Take 37.5 mg by mouth every morning (before breakfast).   vitamin C (ASCORBIC ACID) 1000 MG TABS 8/21/2024 at AM  Yes Yes   Sig: Take 1,000 mg by mouth daily      Facility-Administered Medications: None      Review of Systems     A 12 point comprehensive review of systems was negative except as noted above in HPI.    PHYSICAL EXAMINATION       Vitals      Temp:  [97.9  F (36.6  C)-98.2  F (36.8  C)] 97.9  F (36.6  C)  Pulse:  [76-89] 83  Resp:  [16-29] 18  BP: (104-204)/(53-89) 151/70  SpO2:  [94 %-99 %] 95 %    Examination     GENERAL:  Alert, appears comfortable, in no acute distress, appears stated age, obese   HEAD:  Normocephalic, without obvious abnormality, atraumatic   EYES:  Conjunctiva clear,  EOM's intact   NOSE: Nares normal, mucosa normal, no drainage   THROAT: Lips, mucosa, and gums keenan   NECK: Supple, symmetrical, trachea midline   LUNGS:   Clear to auscultation bilaterally, no rales, rhonchi, or wheezing, symmetric chest rise on inhalation, respirations unlabored   CHEST WALL:  No tenderness or deformity   HEART:  Regular rate and rhythm, S1 and S2 normal, no murmur, rub, or gallop    ABDOMEN:   Soft, non-tender   EXTREMITIES: No lower extremity or ankle edema, status post right total knee arthroplasty   SKIN: No rashes in the visualized areas   NEURO: Alert, moves upper extremities freely   MSK: Current tingling sensation of lower extremities bilaterally, lingering paresthesia, strength 0/5 for plantar and dorsiflexion, upper extremity sensation intact, hand grasp strength 5/5   PSYCH: Cooperative, behavior is appropriate      Pertinent Lab   Most  Recent 3 CBC's:  Recent Labs   Lab Test 08/21/24  1220 06/29/23  0605 06/08/23  1501 10/06/22  1113   WBC 7.3  --  8.0 6.5   HGB 14.5 12.0 14.7 15.3   MCV 95  --  90 89     --  312 320     Most Recent 3 BMP's:  Recent Labs   Lab Test 08/21/24  1224 06/29/23  0316 06/08/23  1501 10/06/22  1113     --  139 138   POTASSIUM 4.7  --  4.4 3.9   CHLORIDE 105  --  103 100   CO2 20*  --  24 27   BUN 12.0  --  19.4 13.6   CR 0.57  --  0.77 0.69   ANIONGAP 13  --  12 11   RAJANI 9.2  --  9.3 9.7   GLC 89 119* 101* 94     Most Recent 2 LFT's:  Recent Labs   Lab Test 06/08/23  1501 10/06/22  1113   AST 20 25   ALT 17 19   ALKPHOS 128* 171*   BILITOTAL 0.4 0.6     Total time spent 70 min

## 2024-08-28 NOTE — ANESTHESIA PROCEDURE NOTES
"Adductor canal Procedure Note    Pre-Procedure   Staff -        Anesthesiologist:  Jody Correa MD       Performed By: anesthesiologist       Location: pre-op       Procedure Start/Stop Times: 8/28/2024 8:46 AM and 8/28/2024 8:49 AM       Pre-Anesthestic Checklist: patient identified, IV checked, site marked, risks and benefits discussed, informed consent, monitors and equipment checked, pre-op evaluation, at physician/surgeon's request and post-op pain management  Timeout:       Correct Patient: Yes        Correct Procedure: Yes        Correct Site: Yes        Correct Position: Yes        Correct Laterality: Yes        Site Marked: Yes  Procedure Documentation  Procedure: Adductor canal       Diagnosis: REQUESTED BY SURGEON FOR POSTOP PAIN       Laterality: right       Patient Position: supine       Patient Prep/Sterile Barriers: sterile gloves, mask       Skin prep: Chloraprep       Needle Type: other (echogenic)       Needle Gauge: 20.        Needle Length (Inches): 4        Ultrasound guided       1. Ultrasound was used to identify targeted nerve, plexus, vascular marker, or fascial plane and place a needle adjacent to it in real-time.       2. Ultrasound was used to visualize the spread of anesthetic in close proximity to the above referenced structure.       3. A permanent image is entered into the patient's record.       4. The visualized anatomic structures appeared normal.       5. There were no apparent abnormal pathologic findings.    Assessment/Narrative         The placement was negative for: blood aspirated, painful injection and site bleeding       Paresthesias: No.       Complications: none    Medication(s) Administered   Bupivacaine 0.5% PF (Infiltration) - Infiltration   15 mL - 8/28/2024 8:46:00 AM  Medication Administration Time: 8/28/2024 8:46 AM      FOR [facility] ([facility]) ONLY:   Pain Team Contact information: please page the Pain Team Via [facility]. Search \"Pain\". During daytime hours, " please page the attending first. At night please page the resident first.

## 2024-08-28 NOTE — CONSULTS
[facility] MEDICINE CONSULT      Assessment & Plan     I agree with the documentation and findings as written by Sandrita Campos and our discussed and formulated assessment and plan. I was present with Sandrita Campos who participated in the service and documentation of the note. I have also personally verified the history and personally performed the physical exam and medical decision-making. I agree with the assessment and plan of care as documented in the note.     Halima Cardoso MD  [facility] Medicine  [facility]  Phone: #[phone]      LUI Mujica-S  [facility] Medicine     Patricia Mckeon is a 63 year old old female with history of Osteoarthritis, GERD, Dyslipidemia, Obesity, and Overactive Bladder, who presents to the PACU postoperatively for a right total knee arthroplasty.  Right knee pain is stable.  Hemodynamically stable postoperatively.     GERD  Stable and asymptomatic  Resume PPI     Moderate obesity    Modification of lifestyle for weight loss  Phentermine 37.5 mg daily, on hold  Metformin 500 mg daily, on hold     Status post right total knee arthroplasty  Resume routine postoperative care  Resume routine postoperative care  Physical and Occupational Therapy  Use incentive spirometry frequently  DVT prophylaxis per orthopedics, aspirin 81 mg twice a day  Pain control with Tylenol 975 mg every 8 hours, 650 mg every 4 hours as needed, oxycodone 5 to 10 mg every 4 hours as needed, IV Dilaudid 0.2 to 0.4 mg every 2 hours as needed           Clinically Significant Risk Factors Present on Admission                # Drug Induced Platelet Defect: home medication list includes an antiplatelet medication                        DVTP: Mechanical Prophylaxis/ Sequential Compression Devices, resume aspirin postoperatively   Code Status: Prior  Disposition: Inpatient   Expected LOS: 1 day  Goals for the hospitalization: post operative recovery      Disposition  Plan     Expected Discharge Date: 08/29/2024                   Chief Complaint Right total knee arthroplasty       HISTORY      Patricia Mckeon is a 63 year old old female with a history of Osteoarthritis, GERD, Dyslipidemia, Obesity, and Overactive Bladder, who presents to the PACU postoperatively for a right total knee arthroplasty. She has had knee pain for many years. She was a  and recently retired, so was now able to get her knee replaced. She notes osteoarthritis of her knees, feet, hips, and shoulders. Surgical history of left and right hip replacements, cholecystectomy, hand surgery, and heel spur surgery. She takes omeprazole for GERD, oxybutynin for urinary urgency, and metformin and phentermine for weight loss. No prior or current diagnosis of Diabetes Mellitus. She also takes daily multivitamins. Patient notes she stopped all medication and vitamins 1 week prior to her surgery, except for the metformin and omeprazole. Her mother had a history of multiple myeloma and her father had a history of lung cancer; both passed due to their cancers.  She does not have history of heart disease, lung disease, bleeding or clotting disorder, sleep apnea.  Preop physical is reviewed.     Patient is feeling well postoperatively. No knee pain, but she notes some shoulder pain due to her osteoarthritis. No N/V, chest pain, SOB, or lightheadedness. No concerns.      Per nursing note, new First Degree AV Block present in ALBERTO, new from last EKG in 2023. Dr. Correa compared ALBERTO and PACU cardiac rhythm strips; no new developments since ALBERTO.      Past Medical History        Past Medical History   Past Medical History:  No date: Dyslipidemia  No date: Gastroesophageal reflux disease without esophagitis  No date: Heart murmur  No date: Motion sickness  No date: Obese  No date: Osteoarthritis      Comment:  knees and hips needing replacement  No date: PONV (postoperative nausea and vomiting)  No date: Urinary  urgency         Surgical History      Past Surgical History         Past Surgical History:   Procedure Laterality Date     ARTHROPLASTY HIP Left 2016     ARTHROPLASTY HIP ANTERIOR Right 06/28/2023     Procedure: RIGHT TOTAL HIP ARTHROPLASTY DIRECT ANTERIOR APPROACH;  Surgeon: Lauro Mcgee MD;  Location: [facility] Main OR     CHOLECYSTECTOMY         HAND SURGERY Right       ganglion removed from middle finger     HEEL SPUR SURGERY Right       LASIK         WISDOM TOOTH EXTRACTION             Family History    Reviewed, and         Family History   Problem Relation Age of Onset     Multiple myeloma Mother       Obesity Father       Lung Cancer Father       Osteoarthritis Sister       Osteoarthritis Sister       Osteoarthritis Brother       Cancer Maternal Grandmother       Lung Cancer Paternal Grandmother       No Known Problems Son       No Known Problems Daughter           Social History       Social History               Tobacco Use     Smoking status: Never       Passive exposure: Never     Smokeless tobacco: Never   Vaping Use     Vaping status: Never Used   Substance Use Topics     Alcohol use: Yes       Comment: special occasions only     Drug use: Not Currently       Types: Marijuana       Comment: gummies         Allergies      Allergies        Allergies   Allergen Reactions     Oxycodone-Acetaminophen Itching         Prior to Admission Medications       Prior to Admission Medications   Prescriptions Last Dose Informant Patient Reported? Taking?   Omega-3 Fatty Acids (FISH OIL) 1360 MG CAPS 8/21/2024 at AM   Yes Yes   Sig: Take 1 capsule by mouth daily.   acetaminophen (TYLENOL 8 HOUR ARTHRITIS PAIN) 650 MG CR tablet Unknown   Yes Yes   Sig: Take 650 mg by mouth every 8 hours as needed for mild pain   aspirin 81 MG EC tablet 8/21/2024 at AM   Yes Yes   Sig: Take 81 mg by mouth daily.   calcium carbonate-vitamin D (CALTRATE) 600-10 MG-MCG per tablet 8/21/2024 at AM   Yes Yes   Sig: Take 1 tablet by  mouth daily.   cholecalciferol (VITAMIN D3) 125 mcg (5000 units) capsule 8/21/2024 at AM   Yes Yes   Sig: Take 1 capsule by mouth daily   garlic 150 MG TABS tablet 8/21/2024 at AM   Yes Yes   Sig: Take 150 mg by mouth daily.   metFORMIN (GLUCOPHAGE XR) 500 MG 24 hr tablet 8/27/2024 at AM   No Yes   Sig: Take 1 tablet (500 mg) by mouth daily with food   omeprazole (PRILOSEC) 20 MG capsule 8/28/2024 at AM   Yes Yes   Sig: Take 20 mg by mouth daily.   phentermine (ADIPEX-P) 37.5 MG tablet 8/21/2024 at AM   Yes Yes   Sig: Take 37.5 mg by mouth every morning (before breakfast).   vitamin C (ASCORBIC ACID) 1000 MG TABS 8/21/2024 at AM   Yes Yes   Sig: Take 1,000 mg by mouth daily      Facility-Administered Medications: None      Review of Systems      A 12 point comprehensive review of systems was negative except as noted above in HPI.     PHYSICAL EXAMINATION         Vitals       Temp:  [97.9  F (36.6  C)-98.2  F (36.8  C)] 97.9  F (36.6  C)  Pulse:  [76-89] 83  Resp:  [16-29] 18  BP: (104-204)/(53-89) 151/70  SpO2:  [94 %-99 %] 95 %     Examination      GENERAL:  Alert, appears comfortable, in no acute distress, appears stated age, obese   HEAD:  Normocephalic, without obvious abnormality, atraumatic   EYES:  Conjunctiva clear,  EOM's intact   NOSE: Nares normal, mucosa normal, no drainage   THROAT: Lips, mucosa, and gums keenan   NECK: Supple, symmetrical, trachea midline   LUNGS:   Clear to auscultation bilaterally, no rales, rhonchi, or wheezing, symmetric chest rise on inhalation, respirations unlabored   CHEST WALL:  No tenderness or deformity   HEART:  Regular rate and rhythm, S1 and S2 normal, no murmur, rub, or gallop    ABDOMEN:   Soft, non-tender   EXTREMITIES: No lower extremity or ankle edema, status post right total knee arthroplasty   SKIN: No rashes in the visualized areas   NEURO: Alert, moves upper extremities freely   MSK: Current tingling sensation of lower extremities bilaterally, lingering  paresthesia, strength 0/5 for plantar and dorsiflexion, upper extremity sensation intact, hand grasp strength 5/5   PSYCH: Cooperative, behavior is appropriate       Pertinent Lab   Most Recent 3 CBC's:         Recent Labs   Lab Test 08/21/24  1220 06/29/23  0605 06/08/23  1501 10/06/22  1113   WBC 7.3  --  8.0 6.5   HGB 14.5 12.0 14.7 15.3   MCV 95  --  90 89     --  312 320      Most Recent 3 BMP's:         Recent Labs   Lab Test 08/21/24  1224 06/29/23  0316 06/08/23  1501 10/06/22  1113     --  139 138   POTASSIUM 4.7  --  4.4 3.9   CHLORIDE 105  --  103 100   CO2 20*  --  24 27   BUN 12.0  --  19.4 13.6   CR 0.57  --  0.77 0.69   ANIONGAP 13  --  12 11   RAJANI 9.2  --  9.3 9.7   GLC 89 119* 101* 94      Most Recent 2 LFT's:       Recent Labs   Lab Test 06/08/23  1501 10/06/22  1113   AST 20 25   ALT 17 19   ALKPHOS 128* 171*   BILITOTAL 0.4 0.6      Total time spent 70 min

## 2024-08-29 ENCOUNTER — APPOINTMENT (OUTPATIENT)
Dept: PHYSICAL THERAPY | Facility: CLINIC | Age: 64
DRG: 470 | End: 2024-08-29
Attending: ORTHOPAEDIC SURGERY
Payer: COMMERCIAL

## 2024-08-29 LAB
ANION GAP SERPL CALCULATED.3IONS-SCNC: 9 MMOL/L (ref 7–15)
BUN SERPL-MCNC: 15.2 MG/DL (ref 8–23)
CALCIUM SERPL-MCNC: 8.4 MG/DL (ref 8.8–10.4)
CHLORIDE SERPL-SCNC: 106 MMOL/L (ref 98–107)
CREAT SERPL-MCNC: 0.59 MG/DL (ref 0.51–0.95)
EGFRCR SERPLBLD CKD-EPI 2021: >90 ML/MIN/1.73M2
ERYTHROCYTE [DISTWIDTH] IN BLOOD BY AUTOMATED COUNT: 14.1 % (ref 10–15)
GLUCOSE BLDC GLUCOMTR-MCNC: 106 MG/DL (ref 70–99)
GLUCOSE SERPL-MCNC: 109 MG/DL (ref 70–99)
HCO3 SERPL-SCNC: 26 MMOL/L (ref 22–29)
HCT VFR BLD AUTO: 32.2 % (ref 35–47)
HGB BLD-MCNC: 10.1 G/DL (ref 11.7–15.7)
HGB BLD-MCNC: 10.7 G/DL (ref 11.7–15.7)
MCH RBC QN AUTO: 29.9 PG (ref 26.5–33)
MCHC RBC AUTO-ENTMCNC: 33.2 G/DL (ref 31.5–36.5)
MCV RBC AUTO: 90 FL (ref 78–100)
PLATELET # BLD AUTO: 242 10E3/UL (ref 150–450)
POTASSIUM SERPL-SCNC: 4.4 MMOL/L (ref 3.4–5.3)
RBC # BLD AUTO: 3.58 10E6/UL (ref 3.8–5.2)
SODIUM SERPL-SCNC: 141 MMOL/L (ref 135–145)
WBC # BLD AUTO: 13.5 10E3/UL (ref 4–11)

## 2024-08-29 PROCEDURE — 250N000013 HC RX MED GY IP 250 OP 250 PS 637: Performed by: FAMILY MEDICINE

## 2024-08-29 PROCEDURE — 250N000011 HC RX IP 250 OP 636: Performed by: ORTHOPAEDIC SURGERY

## 2024-08-29 PROCEDURE — 36415 COLL VENOUS BLD VENIPUNCTURE: CPT | Performed by: NURSE PRACTITIONER

## 2024-08-29 PROCEDURE — 258N000003 HC RX IP 258 OP 636: Performed by: FAMILY MEDICINE

## 2024-08-29 PROCEDURE — 97110 THERAPEUTIC EXERCISES: CPT | Mod: GP

## 2024-08-29 PROCEDURE — 97530 THERAPEUTIC ACTIVITIES: CPT | Mod: GP

## 2024-08-29 PROCEDURE — 82962 GLUCOSE BLOOD TEST: CPT

## 2024-08-29 PROCEDURE — 85018 HEMOGLOBIN: CPT | Performed by: NURSE PRACTITIONER

## 2024-08-29 PROCEDURE — 250N000013 HC RX MED GY IP 250 OP 250 PS 637: Performed by: ORTHOPAEDIC SURGERY

## 2024-08-29 PROCEDURE — 36415 COLL VENOUS BLD VENIPUNCTURE: CPT | Performed by: FAMILY MEDICINE

## 2024-08-29 PROCEDURE — 99233 SBSQ HOSP IP/OBS HIGH 50: CPT | Performed by: FAMILY MEDICINE

## 2024-08-29 PROCEDURE — 85027 COMPLETE CBC AUTOMATED: CPT | Performed by: FAMILY MEDICINE

## 2024-08-29 PROCEDURE — 250N000013 HC RX MED GY IP 250 OP 250 PS 637: Performed by: NURSE PRACTITIONER

## 2024-08-29 PROCEDURE — 80048 BASIC METABOLIC PNL TOTAL CA: CPT | Performed by: FAMILY MEDICINE

## 2024-08-29 PROCEDURE — 97116 GAIT TRAINING THERAPY: CPT | Mod: GP

## 2024-08-29 RX ORDER — ACETAMINOPHEN 325 MG/1
975 TABLET ORAL EVERY 8 HOURS PRN
Status: DISCONTINUED | OUTPATIENT
Start: 2024-08-29 | End: 2024-08-30

## 2024-08-29 RX ORDER — HYDROCODONE BITARTRATE AND ACETAMINOPHEN 5; 325 MG/1; MG/1
1-2 TABLET ORAL EVERY 4 HOURS PRN
Status: DISCONTINUED | OUTPATIENT
Start: 2024-08-29 | End: 2024-08-30

## 2024-08-29 RX ORDER — HYDROCODONE BITARTRATE AND ACETAMINOPHEN 5; 325 MG/1; MG/1
1-2 TABLET ORAL EVERY 4 HOURS PRN
Qty: 25 TABLET | Refills: 0 | Status: SHIPPED | OUTPATIENT
Start: 2024-08-29 | End: 2024-08-30

## 2024-08-29 RX ORDER — ASPIRIN 81 MG/1
81 TABLET ORAL 2 TIMES DAILY
Qty: 60 TABLET | Refills: 0 | Status: SHIPPED | OUTPATIENT
Start: 2024-08-29 | End: 2024-09-28

## 2024-08-29 RX ORDER — AMOXICILLIN 250 MG
1 CAPSULE ORAL 2 TIMES DAILY PRN
Qty: 42 TABLET | Refills: 0 | Status: SHIPPED | OUTPATIENT
Start: 2024-08-29

## 2024-08-29 RX ADMIN — HYDROMORPHONE HYDROCHLORIDE 0.4 MG: 0.2 INJECTION, SOLUTION INTRAMUSCULAR; INTRAVENOUS; SUBCUTANEOUS at 19:24

## 2024-08-29 RX ADMIN — PANTOPRAZOLE SODIUM 40 MG: 40 TABLET, DELAYED RELEASE ORAL at 06:47

## 2024-08-29 RX ADMIN — ACETAMINOPHEN 975 MG: 325 TABLET ORAL at 12:21

## 2024-08-29 RX ADMIN — HYDROMORPHONE HYDROCHLORIDE 0.4 MG: 0.2 INJECTION, SOLUTION INTRAMUSCULAR; INTRAVENOUS; SUBCUTANEOUS at 21:26

## 2024-08-29 RX ADMIN — PANTOPRAZOLE SODIUM 40 MG: 40 TABLET, DELAYED RELEASE ORAL at 15:54

## 2024-08-29 RX ADMIN — CEFAZOLIN SODIUM 2 G: 2 INJECTION, SOLUTION INTRAVENOUS at 00:45

## 2024-08-29 RX ADMIN — ASPIRIN 81 MG: 81 TABLET, COATED ORAL at 10:06

## 2024-08-29 RX ADMIN — SODIUM CHLORIDE 500 ML: 9 INJECTION, SOLUTION INTRAVENOUS at 12:17

## 2024-08-29 RX ADMIN — SODIUM CHLORIDE 500 ML: 9 INJECTION, SOLUTION INTRAVENOUS at 09:18

## 2024-08-29 RX ADMIN — ASPIRIN 81 MG: 81 TABLET, COATED ORAL at 20:03

## 2024-08-29 RX ADMIN — SENNOSIDES AND DOCUSATE SODIUM 1 TABLET: 50; 8.6 TABLET ORAL at 10:06

## 2024-08-29 RX ADMIN — ACETAMINOPHEN 975 MG: 325 TABLET ORAL at 06:47

## 2024-08-29 RX ADMIN — SENNOSIDES AND DOCUSATE SODIUM 1 TABLET: 50; 8.6 TABLET ORAL at 20:03

## 2024-08-29 RX ADMIN — HYDROCODONE BITARTRATE AND ACETAMINOPHEN 1 TABLET: 5; 325 TABLET ORAL at 11:22

## 2024-08-29 RX ADMIN — HYDROCODONE BITARTRATE AND ACETAMINOPHEN 1 TABLET: 5; 325 TABLET ORAL at 01:36

## 2024-08-29 ASSESSMENT — ACTIVITIES OF DAILY LIVING (ADL)
ADLS_ACUITY_SCORE: 26
ADLS_ACUITY_SCORE: 26
ADLS_ACUITY_SCORE: 27
ADLS_ACUITY_SCORE: 26
ADLS_ACUITY_SCORE: 27
ADLS_ACUITY_SCORE: 27
ADLS_ACUITY_SCORE: 26
ADLS_ACUITY_SCORE: 26
ADLS_ACUITY_SCORE: 27
ADLS_ACUITY_SCORE: 26
ADLS_ACUITY_SCORE: 27
ADLS_ACUITY_SCORE: 26
ADLS_ACUITY_SCORE: 30
ADLS_ACUITY_SCORE: 26
ADLS_ACUITY_SCORE: 27

## 2024-08-29 NOTE — PLAN OF CARE
Problem: Knee Arthroplasty  Goal: Optimal Pain Control and Function  Outcome: Progressing     Problem: Knee Arthroplasty  Goal: Effective Urinary Elimination  Outcome: Progressing      Patient alert and oriented. VSS. RA. LR infusing at 100ml/hr. Tolerating Regular diet. Ambulating and voiding spontaneously. A1 with walker and gait belt. PRN IV Dilaudid given for pain along with ice packs and repositioning.  Call light within reach. Calls appropriately. Alarms on.

## 2024-08-29 NOTE — PROGRESS NOTES
Physical Therapy Discharge Summary    Reason for therapy discharge:    All goals and outcomes met, no further needs identified.    Progress towards therapy goal(s). See goals on Care Plan in [facility] electronic health record for goal details.  Goals met    Therapy recommendation(s):    Continued therapy is recommended.  Rationale/Recommendations:  Continue home exercise program.

## 2024-08-29 NOTE — PROGRESS NOTES
[facility] Orthopaedics Progress Note      Post-operative Day: 1 Day Post-Op    Procedure(s):  RIGHT TOTAL KNEE ARTHROPLASTY      Subjective: Patient seen resting in bed, reports had episode of severe pain and near syncopal while working with therapies  with brief episode of hypotension.  BP and symptoms resolved with rest.  Reports pain has been tolerable with Norco. Currently feeling back to her baseline.  Reports tolerating a regular diet with no nausea or vomiting.  Voiding without difficulty and passing gas.     Pain: moderate  Chest pain, SOB:  No    Objective:  Blood pressure 97/51, pulse 74, temperature 97.4  F (36.3  C), temperature source Oral, resp. rate 18, SpO2 94%, not currently breastfeeding.    Patient Vitals for the past 24 hrs:   BP Temp Temp src Pulse Resp SpO2   08/29/24 0924 97/51 -- -- -- -- --   08/29/24 0854 96/46 -- -- -- -- --   08/29/24 0850 (!) 79/46 -- -- -- -- --   08/29/24 0845 (!) 82/39 -- -- -- -- --   08/29/24 0840 (!) 63/35 -- -- -- -- --   08/29/24 0730 118/57 97.4  F (36.3  C) Oral 74 -- --   08/29/24 0045 116/56 98.1  F (36.7  C) Oral 72 18 94 %   08/28/24 2211 93/54 97.4  F (36.3  C) Oral 70 18 94 %   08/28/24 1845 119/59 98.3  F (36.8  C) Oral 95 18 95 %   08/28/24 1645 136/62 97.8  F (36.6  C) Oral 94 20 93 %   08/28/24 1545 (!) 152/72 98.2  F (36.8  C) Oral 85 20 93 %   08/28/24 1436 (!) 153/71 97.4  F (36.3  C) Oral 86 22 92 %   08/28/24 1409 -- -- -- 90 -- 95 %   08/28/24 1400 (!) 148/77 -- -- 89 16 95 %   08/28/24 1300 (!) 148/77 -- -- 90 16 96 %   08/28/24 1230 (!) 151/76 -- -- 85 20 94 %   08/28/24 1200 (!) 142/72 -- -- 80 -- 94 %   08/28/24 1150 (!) 147/73 97.9  F (36.6  C) -- 81 23 94 %   08/28/24 1140 (!) 151/70 97.9  F (36.6  C) -- 83 18 95 %   08/28/24 1132 (!) 152/69 97.9  F (36.6  C) -- 82 17 96 %   08/28/24 1130 (!) 204/82 97.9  F (36.6  C) -- 82 26 96 %   08/28/24 1120 (!) 144/67 97.9  F (36.6  C) -- 85 17 94 %   08/28/24 1110 104/53 97.9  F (36.6  C) -- 87 29  97 %   08/28/24 1100 128/58 98.2  F (36.8  C) Core 89 21 94 %       Wt Readings from Last 4 Encounters:   08/21/24 112.5 kg (248 lb)   03/01/24 103.4 kg (228 lb)   10/06/23 103.4 kg (228 lb)   08/10/23 107 kg (236 lb)       General: Alert and orientated, NAD  Respiratory: Non-labored breathing on RA  RLE motor function, sensation, and circulation intact   Yes, Dorsiflexion/plantarflexion intact and equal bilaterally. Moves all other extremities with ease and purpose   Wound status: incisions are clean dry and intact. Yes  Calf tenderness: Bilateral  No    Pertinent Labs   Lab Results: personally reviewed.     Recent Labs   Lab Test 08/29/24  0632 08/21/24  1224 08/21/24  1220 06/29/23  0605 06/08/23  1501 10/06/22  1113   WBC 13.5*  --  7.3  --  8.0 6.5   HGB 10.7*  --  14.5 12.0 14.7 15.3   HCT 32.2*  --  45.3  --  43.3 46.2   MCV 90  --  95  --  90 89     --  280  --  312 320   NA  --  138  --   --  139 138       Plan:   ABLA- Hgb 10.7, down from 14.5. Given near syncope this AM will recheck Hgb at 1200.   Hospitalist following, getting bolus this AM. Appreciate cares and recommendations.   Anticoagulation protocol: Aspirin 81 mg BID  x 30  days  Pain medications: Multimodal approach with ice, norco.  Tylenol changed to as needed to avoid reaching maximum Tylenol dosage and noted  Weight bearing status:  WBAT  Disposition:  home pending clearance from therapies and hospitalist    Continue cares and rehabilitation     Report completed by:  SHEFALI Dixon CNP  Date: 8/29/2024  Time: 6:56 AM

## 2024-08-29 NOTE — PROGRESS NOTES
Patient vital signs are at baseline: Yes - 0840 - Pt was severely hypotensive (BP 63/35 in therapy and in bed 97/51) and lightheaded during therapy. She said it started after she tried being her knee when she was climbing stairs. She had a 500 NS bolus after that. 1122- Tried ambulating again after the bolus was completed and the BP was stable but she got lightheaded again. BP dropped only to 97/73. Another 500cc NS bolus given. Pt was not dizzy when getting up to the bathroom at 1310. Last BP taken was 127/59.   Patient able to ambulate as they were prior to admission or with assist devices provided by therapies during their stay:  Yes - Pt able to do the stairs but not sure if she passed PT yet.   Patient MUST void prior to discharge:  Yes  Patient able to tolerate oral intake:  Yes  Pain has adequate pain control using Oral analgesics:  Yes   Does patient have an identified :  Yes  Has goal D/C date and time been discussed with patient:  Yes - Patient complains of calf pain and she is unable to dorsiflex due to pain. Tiny from [facility] notified. We are cancelling her discharge for today and getting an US tonight. WHS continued LR at 75 ml/hr.     Kris Frazier RN,ONC

## 2024-08-29 NOTE — PROGRESS NOTES
[facility] MEDICINE PROGRESS NOTE    I agree with the documentation and findings as written by Sandrita Campos and our discussed and formulated assessment and plan. I was present with Sandrita Campos who participated in the service and documentation of the note. I have also personally verified the history and personally performed the physical exam and medical decision-making. I agree with the assessment and plan of care as documented in the note.     Halima Cardoso MD  [facility] Medicine  [facility]  Phone: #[phone]       Identification/Summary:   Patricia Mckeon is a 63 year old old female with history of Osteoarthritis, GERD, Dyslipidemia, Obesity, and Overactive Bladder, who presents to the PACU postoperatively for a right total knee arthroplasty.  POD 1.  Right knee pain is stable.  Patient felt lightheaded and dizzy during physical therapy.  BP dropped to 63/39--> 97/ 50 after 500 mL mL normal saline bolus.  Patient is still symptomatic.  Will get another 500 bolus.  No history of hypertension.    Assessment and Plan:  Near syncope  Symptomatic hypotension 63/39--> 97/ 50  Status post 500 mL normal saline bolus x 2  Close monitoring of blood pressure    Acute blood loss anemia  Hemoglobin 10 from 14.5    GERD  Stable and asymptomatic  Continue PPI     Moderate obesity    Modification of lifestyle for weight loss  Phentermine 37.5 mg daily, on hold  Metformin 500 mg daily, on hold     Status post right total knee arthroplasty POD2  Resume routine postoperative care  Physical and Occupational Therapy  Use incentive spirometry frequently  DVT prophylaxis per orthopedics, aspirin 81 mg twice a day  Pain control with Tylenol 975 mg every 8 hours, 650 mg every 4 hours as needed, oxycodone 5 to 10 mg every 4 hours as needed     Clinically Significant Risk Factors Present on Admission                # Drug Induced Platelet Defect: home medication list includes an  antiplatelet medication      # Anemia: based on hgb <11                Diet: Advance Diet as Tolerated: Regular Diet Adult  Discharge Instruction - Regular Diet Adult  Trejo Catheter: Not present  Lines: None       DVT Prophylaxis: per orthopedics, aspirin 81 mg twice a day  Code Status: Full Code    Medically ready for discharge: today    Disposition Plan     Expected Discharge Date: 08/29/2024                The patient's care was discussed with the Patient.    MARSHALL Mujica  [facility]  Phone: #[phone]  Securely message with the Vocera Web Console (learn more here)  Text page via [employer] Paging/Directory     Interval History/Subjective:  Patient feeling lightheaded during our visit as she had just finished PT and experienced an episode of dizziness, diaphoresis, and hypotension when walking up the PT stairs. She was laying in bed with a cold cloth on her forehead, speaking appropriately. BP steadily rising at that time. Besides the episode of dizziness, patient has been feeling good. She notes no right knee pain and states the norco and ice packs have been working well for pain management. No N/V, chest pain, or SOB. PT and OT seen.     Physical Exam/Objective:  Temp:  [97.4  F (36.3  C)-98.3  F (36.8  C)] 97.4  F (36.3  C)  Pulse:  [70-95] 74  Resp:  [16-29] 18  BP: ()/(35-82) 97/51  SpO2:  [92 %-97 %] 94 %  There is no height or weight on file to calculate BMI.    GENERAL:  Alert, appears comfortable, in no acute distress, appears stated age   HEAD:  Normocephalic, without obvious abnormality, atraumatic   EYES:  conjunctiva/corneas clear, no scleral icterus   NOSE: Nares normal, mucosa normal, no drainage   THROAT: Lips, mucosa, and tongue normal   NECK: Supple, symmetrical, trachea midline   LUNGS:   Clear to auscultation bilaterally, no rales, rhonchi, or wheezing, symmetric chest rise on inhalation, respirations unlabored   CHEST WALL:  No tenderness  or deformity   HEART:  Regular rate and rhythm, S1 and S2 normal, no murmur, rub, or gallop    ABDOMEN:   Soft, non-tender   EXTREMITIES: Trace ankle edema, no lower extremity edema, status post right total knee arthroplasty    MSK: Upper and lower extremity sensation intact, strength 5/5 for plantarflexion, dorsiflexion, and hand grasp    SKIN: No rashes in the visualized areas   NEURO: Alert,oriented x 3   PSYCH: Cooperative, behavior is appropriate      Data reviewed today: I personally reviewed all new medications, labs, imaging/diagnostics reports over the past 24 hours. Pertinent findings include:      Labs:  Most Recent 3 CBC's:  Recent Labs   Lab Test 08/29/24  0632 08/21/24  1220 06/29/23  0605 06/08/23  1501   WBC 13.5* 7.3  --  8.0   HGB 10.7* 14.5 12.0 14.7   MCV 90 95  --  90    280  --  312     Most Recent 3 BMP's:  Recent Labs   Lab Test 08/29/24  0751 08/29/24  0632 08/21/24  1224 06/29/23  0316 06/08/23  1501   NA  --  141 138  --  139   POTASSIUM  --  4.4 4.7  --  4.4   CHLORIDE  --  106 105  --  103   CO2  --  26 20*  --  24   BUN  --  15.2 12.0  --  19.4   CR  --  0.59 0.57  --  0.77   ANIONGAP  --  9 13  --  12   RAJANI  --  8.4* 9.2  --  9.3   * 109* 89   < > 101*    < > = values in this interval not displayed.     Most Recent 2 LFT's:  Recent Labs   Lab Test 06/08/23  1501 10/06/22  1113   AST 20 25   ALT 17 19   ALKPHOS 128* 171*   BILITOTAL 0.4 0.6       Medications:   Personally Reviewed.  Medications   Current Facility-Administered Medications   Medication Dose Route Frequency Provider Last Rate Last Admin    lactated ringers infusion   Intravenous Continuous Lauro Mcgee  mL/hr at 08/28/24 1551 New Bag at 08/28/24 1551     Current Facility-Administered Medications   Medication Dose Route Frequency Provider Last Rate Last Admin    aspirin EC tablet 81 mg  81 mg Oral BID Lauro Mcgee MD   81 mg at 08/28/24 2110    ceFAZolin Sodium (ANCEF) injection 2  g  2 g Intravenous See Admin Instructions Lauro Mcgee MD        pantoprazole (PROTONIX) EC tablet 40 mg  40 mg Oral BID AC Halima Cardoso MD   40 mg at 08/29/24 0647    polyethylene glycol (MIRALAX) Packet 17 g  17 g Oral Daily Lauro Mcgee MD        scopolamine (TRANSDERM) 72 hr patch 1 patch  1 patch Transdermal Once Jody Correa MD   1 patch at 08/28/24 0848    And    scopolamine (TRANSDERM-SCOP) Patch in Place   Transdermal Q8H Jody Correa MD        senna-docusate (SENOKOT-S/PERICOLACE) 8.6-50 MG per tablet 1 tablet  1 tablet Oral BID Lauro Mcgee MD   1 tablet at 08/28/24 2110    sodium chloride (PF) 0.9% PF flush 3 mL  3 mL Intracatheter Q8H Lauro Mcgee MD        sodium chloride (PF) 0.9% PF flush 3 mL  3 mL Intracatheter Q8H Lauro Mcgee MD        sodium chloride 0.9% BOLUS 500 mL  500 mL Intravenous Once Halima Cardoso  mL/hr at 08/29/24 0918 500 mL at 08/29/24 0918      Total time spent 50 min

## 2024-08-29 NOTE — PLAN OF CARE

## 2024-08-29 NOTE — PROVIDER NOTIFICATION
Notified  Physician on call for Dr. Mcgee  at 0113 AM regarding  patient having no po pain medications ordered. Previous PRN oxy order was discontinued by pharmacy due to an allergy listsed to oxycodone and was not replaced with anything .      Spoke with: Dr. Chicas    Orders were not obtained.    Comments: PRN Norco 1-2 tabs Q4H.     Per patient she had previously tolerated this medication before. Notified pharmacy.

## 2024-08-30 ENCOUNTER — APPOINTMENT (OUTPATIENT)
Dept: ULTRASOUND IMAGING | Facility: CLINIC | Age: 64
DRG: 470 | End: 2024-08-30
Attending: STUDENT IN AN ORGANIZED HEALTH CARE EDUCATION/TRAINING PROGRAM
Payer: COMMERCIAL

## 2024-08-30 ENCOUNTER — APPOINTMENT (OUTPATIENT)
Dept: CT IMAGING | Facility: CLINIC | Age: 64
DRG: 470 | End: 2024-08-30
Attending: FAMILY MEDICINE
Payer: COMMERCIAL

## 2024-08-30 PROBLEM — E66.01 MORBID OBESITY (H): Status: ACTIVE | Noted: 2022-10-06

## 2024-08-30 PROBLEM — R60.0 BILATERAL LEG EDEMA: Status: ACTIVE | Noted: 2024-08-30

## 2024-08-30 PROBLEM — I82.4Y9 ACUTE DEEP VEIN THROMBOSIS (DVT) OF PROXIMAL VEIN OF LOWER EXTREMITY, UNSPECIFIED LATERALITY (H): Status: ACTIVE | Noted: 2024-08-30

## 2024-08-30 PROBLEM — M17.11 PRIMARY OSTEOARTHRITIS OF RIGHT KNEE: Status: ACTIVE | Noted: 2024-08-30

## 2024-08-30 LAB
FASTING STATUS PATIENT QL REPORTED: YES
GLUCOSE SERPL-MCNC: 110 MG/DL (ref 70–99)
HGB BLD-MCNC: 9.4 G/DL (ref 11.7–15.7)

## 2024-08-30 PROCEDURE — 85018 HEMOGLOBIN: CPT | Performed by: ORTHOPAEDIC SURGERY

## 2024-08-30 PROCEDURE — 36415 COLL VENOUS BLD VENIPUNCTURE: CPT | Performed by: ORTHOPAEDIC SURGERY

## 2024-08-30 PROCEDURE — 82947 ASSAY GLUCOSE BLOOD QUANT: CPT | Performed by: ORTHOPAEDIC SURGERY

## 2024-08-30 PROCEDURE — 71275 CT ANGIOGRAPHY CHEST: CPT

## 2024-08-30 PROCEDURE — 250N000013 HC RX MED GY IP 250 OP 250 PS 637: Performed by: ORTHOPAEDIC SURGERY

## 2024-08-30 PROCEDURE — 93971 EXTREMITY STUDY: CPT | Mod: RT

## 2024-08-30 PROCEDURE — 120N000001 HC R&B MED SURG/OB

## 2024-08-30 PROCEDURE — 250N000013 HC RX MED GY IP 250 OP 250 PS 637: Performed by: FAMILY MEDICINE

## 2024-08-30 PROCEDURE — 250N000011 HC RX IP 250 OP 636: Performed by: FAMILY MEDICINE

## 2024-08-30 PROCEDURE — 99233 SBSQ HOSP IP/OBS HIGH 50: CPT | Performed by: FAMILY MEDICINE

## 2024-08-30 PROCEDURE — 250N000011 HC RX IP 250 OP 636: Performed by: ORTHOPAEDIC SURGERY

## 2024-08-30 PROCEDURE — 250N000013 HC RX MED GY IP 250 OP 250 PS 637: Performed by: NURSE PRACTITIONER

## 2024-08-30 RX ORDER — POTASSIUM CHLORIDE 1500 MG/1
20 TABLET, EXTENDED RELEASE ORAL DAILY
Qty: 5 TABLET | Refills: 0 | Status: SHIPPED | OUTPATIENT
Start: 2024-08-30

## 2024-08-30 RX ORDER — HYDROMORPHONE HYDROCHLORIDE 2 MG/1
2-4 TABLET ORAL EVERY 4 HOURS PRN
Status: DISCONTINUED | OUTPATIENT
Start: 2024-08-30 | End: 2024-08-31 | Stop reason: HOSPADM

## 2024-08-30 RX ORDER — ACETAMINOPHEN 325 MG/1
975 TABLET ORAL EVERY 6 HOURS
Status: DISCONTINUED | OUTPATIENT
Start: 2024-08-30 | End: 2024-08-31 | Stop reason: HOSPADM

## 2024-08-30 RX ORDER — FUROSEMIDE 20 MG
20 TABLET ORAL DAILY
Status: DISCONTINUED | OUTPATIENT
Start: 2024-08-30 | End: 2024-08-31

## 2024-08-30 RX ORDER — IOPAMIDOL 755 MG/ML
75 INJECTION, SOLUTION INTRAVASCULAR ONCE
Status: COMPLETED | OUTPATIENT
Start: 2024-08-30 | End: 2024-08-30

## 2024-08-30 RX ORDER — FUROSEMIDE 20 MG
20 TABLET ORAL DAILY
Qty: 4 TABLET | Refills: 0 | Status: SHIPPED | OUTPATIENT
Start: 2024-08-31 | End: 2024-09-04

## 2024-08-30 RX ORDER — ACETAMINOPHEN 325 MG/1
975 TABLET ORAL EVERY 6 HOURS
Status: SHIPPED
Start: 2024-08-30

## 2024-08-30 RX ORDER — METHOCARBAMOL 500 MG/1
500 TABLET, FILM COATED ORAL 3 TIMES DAILY PRN
Status: DISCONTINUED | OUTPATIENT
Start: 2024-08-30 | End: 2024-08-31 | Stop reason: HOSPADM

## 2024-08-30 RX ADMIN — ACETAMINOPHEN 975 MG: 325 TABLET ORAL at 09:59

## 2024-08-30 RX ADMIN — APIXABAN 10 MG: 5 TABLET, FILM COATED ORAL at 20:58

## 2024-08-30 RX ADMIN — HYDROMORPHONE HYDROCHLORIDE 0.4 MG: 0.2 INJECTION, SOLUTION INTRAMUSCULAR; INTRAVENOUS; SUBCUTANEOUS at 00:17

## 2024-08-30 RX ADMIN — HYDROMORPHONE HYDROCHLORIDE 4 MG: 2 TABLET ORAL at 15:01

## 2024-08-30 RX ADMIN — HYDROMORPHONE HYDROCHLORIDE 4 MG: 2 TABLET ORAL at 19:33

## 2024-08-30 RX ADMIN — APIXABAN 10 MG: 5 TABLET, FILM COATED ORAL at 10:59

## 2024-08-30 RX ADMIN — FUROSEMIDE 20 MG: 20 TABLET ORAL at 15:39

## 2024-08-30 RX ADMIN — IOPAMIDOL 75 ML: 755 INJECTION, SOLUTION INTRAVENOUS at 10:37

## 2024-08-30 RX ADMIN — METHOCARBAMOL 500 MG: 500 TABLET ORAL at 15:01

## 2024-08-30 RX ADMIN — ACETAMINOPHEN 975 MG: 325 TABLET ORAL at 15:37

## 2024-08-30 RX ADMIN — HYDROCODONE BITARTRATE AND ACETAMINOPHEN 2 TABLET: 5; 325 TABLET ORAL at 05:33

## 2024-08-30 RX ADMIN — PANTOPRAZOLE SODIUM 40 MG: 40 TABLET, DELAYED RELEASE ORAL at 15:37

## 2024-08-30 RX ADMIN — PANTOPRAZOLE SODIUM 40 MG: 40 TABLET, DELAYED RELEASE ORAL at 05:33

## 2024-08-30 RX ADMIN — METHOCARBAMOL 500 MG: 500 TABLET ORAL at 10:05

## 2024-08-30 RX ADMIN — ASPIRIN 81 MG: 81 TABLET, COATED ORAL at 09:59

## 2024-08-30 RX ADMIN — ACETAMINOPHEN 975 MG: 325 TABLET ORAL at 21:01

## 2024-08-30 RX ADMIN — HYDROMORPHONE HYDROCHLORIDE 4 MG: 2 TABLET ORAL at 10:05

## 2024-08-30 ASSESSMENT — ACTIVITIES OF DAILY LIVING (ADL)
ADLS_ACUITY_SCORE: 27
ADLS_ACUITY_SCORE: 27
ADLS_ACUITY_SCORE: 28
ADLS_ACUITY_SCORE: 27
ADLS_ACUITY_SCORE: 28
ADLS_ACUITY_SCORE: 28
ADLS_ACUITY_SCORE: 27
ADLS_ACUITY_SCORE: 27
ADLS_ACUITY_SCORE: 28
ADLS_ACUITY_SCORE: 28
ADLS_ACUITY_SCORE: 27
ADLS_ACUITY_SCORE: 28
ADLS_ACUITY_SCORE: 27
ADLS_ACUITY_SCORE: 28
ADLS_ACUITY_SCORE: 27
ADLS_ACUITY_SCORE: 28
ADLS_ACUITY_SCORE: 27
ADLS_ACUITY_SCORE: 27
ADLS_ACUITY_SCORE: 28
ADLS_ACUITY_SCORE: 27
ADLS_ACUITY_SCORE: 27
ADLS_ACUITY_SCORE: 28
ADLS_ACUITY_SCORE: 28

## 2024-08-30 NOTE — PLAN OF CARE
"Vitals stable, pt up w/ a SBA. Pain medication switched to PO dilaudid, pt says this \"is working much better\", brings her pain down to a 3.     Right calf very swollen and red. Pt complains of pain to calf and says it is worse than knee. Pt had CTA this shift.   "

## 2024-08-30 NOTE — PLAN OF CARE
"0830- [place] radiology calling to get in touch w/ hospitalist regarding patients ultrasound results. Unable to connect doctor and radiologist after several attempts.     0930- Radiologist communicated results to writer. R leg ultrasound positive for a DVT in the popliteal vein. Hospitalist and [facility] PA updated, new orders placed.     Right leg is very swollen, pt says it feels like the \"whole leg is going to pop\". Calf is tender, red, and warm.     Pt to have CTA chest this AM.   "

## 2024-08-30 NOTE — PROGRESS NOTES
AO x4. VSS on RA. Pain was managed with PRN IV dilaudid and PRN norco. IV SL. Right leg is swollen, painful and warm to touch. Blister noted at the dressing site. MD order STAT US. CMS intact. Makes needs known. Call light within reach.

## 2024-08-30 NOTE — PROGRESS NOTES
[facility] Orthopaedics Progress Note      Post-operative Day: 2 Day Post-Op    Procedure(s):  RIGHT TOTAL KNEE ARTHROPLASTY      Subjective: Patient seen resting in bed, increased pain and swelling today to right lower extremity, pain most severe to right calf. US completed this AM, positive for DVT, now started on blood thinners. No further episodes of dizziness or feeling lightheaded. Tolerating a regular diet with no nausea or vomiting.  Voiding without difficulty and passing gas. New blister with serous fluid noted this AM to distal/lateral aspect of dressing.      Pain: moderate  Chest pain, SOB:  No    Objective:  Blood pressure 136/63, pulse 90, temperature 98  F (36.7  C), temperature source Oral, resp. rate 16, SpO2 92%, not currently breastfeeding.    Patient Vitals for the past 24 hrs:   BP Temp Temp src Pulse Resp SpO2   08/30/24 0800 136/63 98  F (36.7  C) Oral 90 16 92 %   08/29/24 2334 120/59 98.5  F (36.9  C) Oral 88 16 94 %   08/29/24 1700 119/56 97.9  F (36.6  C) Oral 84 18 96 %   08/29/24 1424 127/59 -- -- 81 -- --   08/29/24 1142 105/52 -- -- 68 -- --   08/29/24 1118 111/59 -- -- 81 -- --       Wt Readings from Last 4 Encounters:   08/21/24 112.5 kg (248 lb)   03/01/24 103.4 kg (228 lb)   10/06/23 103.4 kg (228 lb)   08/10/23 107 kg (236 lb)       General: Alert and orientated, NAD  Respiratory: Non-labored breathing on RA  RLE with increased swelling and tenderness motor function, sensation, and circulation intact   Yes, Dorsiflexion/plantarflexion intact and equal bilaterally. Moves all other extremities with ease and purpose   Wound status: incisions are clean dry and intact. Surrounding ecchymosis, medium/large blister to distal/lateral aspect of dressing  Calf tenderness: Right calf pain- US 8/30/24 positive for DVT    Pertinent Labs   Lab Results: personally reviewed.     Recent Labs   Lab Test 08/29/24  0632 08/21/24  1224 08/21/24  1220 06/29/23  0605 06/08/23  1501 10/06/22  1113   WBC  13.5*  --  7.3  --  8.0 6.5   HGB 10.7*  --  14.5 12.0 14.7 15.3   HCT 32.2*  --  45.3  --  43.3 46.2   MCV 90  --  95  --  90 89     --  280  --  312 320   NA  --  138  --   --  139 138       Plan:   Local cares to new blisters, may cover with gauze and tape.   US this AM positive for right popliteal vein DVT- Started on Eliquis, will stop ASA.   ABLA- Hgb 9.4 down from 10.1. Remains HD stable. May have component of dilution given recent bolus. Will continue to monitor, recheck Hgb sooner if becomes symptomatic.   Hospitalist following appreciate cares and recommendations.   Anticoagulation protocol: Eliquis  Pain medications: Multimodal approach with ice, will changed from Conesus to PO Dilaudid, Schedule Tylenol and add Robaxin.   Weight bearing status:  WBAT  Disposition:  home pending clearance from therapies and hospitalist  and adequate pain control   Continue cares and rehabilitation     Report completed by:  SHEFALI Dixon CNP  Date: 8/30/2024  Time: 8:30 AM

## 2024-08-30 NOTE — PROGRESS NOTES
[facility] MEDICINE PROGRESS NOTE    I agree with the documentation and findings as written by Sandrita Campos   and our discussed and formulated assessment and plan. I was present with Sandrita Campos   who participated in the service and documentation of the note. I have also personally verified the history and personally performed the physical exam and medical decision-making. I agree with the assessment and plan of care as documented in the note.    Halima Cardoso MD  Internal Medicine  Hospitalist  [facility]  Phone: #[phone]       Identification/Summary:   Patricia Mckeon is a 63 year old old female with history of Osteoarthritis, GERD, Dyslipidemia, Obesity, and Overactive Bladder, who presents to the PACU postoperatively for a right total knee arthroplasty. Patient felt lightheaded and dizzy during physical therapy on postop day 1.  Blood pressure was low.  Responded with IV hydration.  No history of hypertension or other blood pressure lowering medications.  Patient is up with ambulating well on postop day 2 without difficulty.  No further syncope.  Hemoglobin is stable at 9.4 from 14.5.    She had significant right calf pain.  Ultrasound revealed right peroneal vein DVT.  No history of DVT in the past.  Started on Eliquis for 3 months.  Chest CT scan is negative for pulmonary embolism.  Remains hemodynamically stable.      Assessment and Plan:  Deep Vein Thrombosis of Right Popliteal vein  Venous U/S done, exam positive for DVT of right popliteal vein.   Patient afebrile. VSS.   Initiate Eliquis 10 mg twice daily for 14 doses, then Eliquis 5 mg twice daily for 3-month  Chest CT scan is negative for PE     Near syncope on postop day 1.  No further recurrence  Symptomatic hypotension , resolved  Responded with IV hydration  BP remains stable today, currently 136/63 mmHg.     Bilateral leg edema right more than left  Chest CT-Mild pulmonary trunk  enlargement; correlate for pulmonary hypertension.  Lasix 20 mg daily for 3 doses     Acute blood loss anemia  Hemoglobin 9.4 from 14.5    GERD  Stable and asymptomatic  Continue PPI     Moderate obesity    Modification of lifestyle for weight loss  Phentermine 37.5 mg daily, on hold  Metformin 500 mg daily, on hold     Status post right total knee arthroplasty POD2  Resume routine postoperative care  Physical and Occupational Therapy  Use incentive spirometry frequently  Started on Eliquis for right leg DVT x 3 months  Pain control with Tylenol 975 mg every 8 hours, 650 mg every 4 hours as needed, po Dilaudid 2 to 4 mg every 4 hours as needed    Clinically Significant Risk Factors Present on Admission                # Drug Induced Platelet Defect: home medication list includes an antiplatelet medication      # Anemia: based on hgb <11                  Diet: Advance Diet as Tolerated: Regular Diet Adult  Discharge Instruction - Regular Diet Adult  Trejo Catheter: Not present  Lines: None       DVT Prophylaxis: initiation of Eliquis 10 mg BID for current diagnosis of DVT of right popliteal vein.   Code Status: Full Code    Medically ready for discharge:  today      MARSHALL Mujica  [facility]  Phone: #[phone]  Securely message with the Vocera Web Console (learn more here)  Text page via [employer] Paging/Directory     Interval History/Subjective:  Patient is doing okay this morning. Her right calf continues to be painful; the pain has remained the same since last night. She feels tightness and burning of her right calf with difficulty of dorsiflexion of right foot. Pain of her right knee is okay. The pain medication has been helping both her right knee and calf pain and allowed her to get some sleep last night. She notes some difficulty breathing with movement/activity. No abdominal pain or chest pain. She notes some lightheadedness if she tries to move her right leg.  She is eating well. She is urinating well. No BM yet, but she has been passing flatus.     Physical Exam/Objective:  Temp:  [97.9  F (36.6  C)-98.5  F (36.9  C)] 98  F (36.7  C)  Pulse:  [68-90] 90  Resp:  [16-18] 16  BP: ()/(51-63) 136/63  SpO2:  [92 %-96 %] 92 %  There is no height or weight on file to calculate BMI.    GENERAL:  Alert, appears comfortable, in no acute distress, appears stated age   HEAD:  Normocephalic, without obvious abnormality, atraumatic   EYES:  conjunctiva/corneas clear, no scleral icterus, EOM's intact   NOSE: Nares normal, septum midline, mucosa normal, no drainage   THROAT: Lips, mucosa, and gums normal   NECK: Supple, symmetrical, trachea midline   BACK:   Symmetric, no curvature   LUNGS:   Clear to auscultation bilaterally, no rales, rhonchi, or wheezing, symmetric chest rise on inhalation, respirations unlabored   CHEST WALL:  No tenderness or deformity   HEART:  Regular rate and rhythm, S1 and S2 normal, no murmur, rub, or gallop    ABDOMEN:   Soft, non-tender, no masses, no organomegaly, no rebound or guarding   EXTREMITIES: Bilateral lower extremity edema, right more than left, that is post right knee arthroplasty   SKIN: No rashes in the visualized areas, engorged blister present on lateral side of R knee ner incision site   NEURO: Alert, moves all four extremities freely   PSYCH: Cooperative, behavior is appropriate      Data reviewed today: I personally reviewed all new medications, labs, imaging/diagnostics reports over the past 24 hours. Pertinent findings include:    Imaging:   Right leg us  Exam is positive for DVT involving the right popliteal vein.     CHEST CT  1.  Motion artifact.   2.  No obvious pulmonary embolism.   3.  Mild pulmonary trunk enlargement; correlate for pulmonary hypertension.   4.  Hepatic steatosis.        Labs:  Most Recent 3 CBC's:  Recent Labs   Lab Test 08/30/24  0629 08/29/24  1205 08/29/24  0632 08/21/24  1220 06/29/23  0605 06/08/23  1501    WBC  --   --  13.5* 7.3  --  8.0   HGB 9.4* 10.1* 10.7* 14.5   < > 14.7   MCV  --   --  90 95  --  90   PLT  --   --  242 280  --  312    < > = values in this interval not displayed.     Most Recent 3 BMP's:  Recent Labs   Lab Test 08/30/24  0629 08/29/24  0751 08/29/24  0632 08/21/24  1224 06/29/23  0316 06/08/23  1501   NA  --   --  141 138  --  139   POTASSIUM  --   --  4.4 4.7  --  4.4   CHLORIDE  --   --  106 105  --  103   CO2  --   --  26 20*  --  24   BUN  --   --  15.2 12.0  --  19.4   CR  --   --  0.59 0.57  --  0.77   ANIONGAP  --   --  9 13  --  12   RAJANI  --   --  8.4* 9.2  --  9.3   * 106* 109* 89   < > 101*    < > = values in this interval not displayed.     Most Recent 2 LFT's:  Recent Labs   Lab Test 06/08/23  1501 10/06/22  1113   AST 20 25   ALT 17 19   ALKPHOS 128* 171*   BILITOTAL 0.4 0.6       Medications:   Personally Reviewed.  Medications   Current Facility-Administered Medications   Medication Dose Route Frequency Provider Last Rate Last Admin     Current Facility-Administered Medications   Medication Dose Route Frequency Provider Last Rate Last Admin    aspirin EC tablet 81 mg  81 mg Oral BID Lauro Mcgee MD   81 mg at 08/29/24 2003    ceFAZolin Sodium (ANCEF) injection 2 g  2 g Intravenous See Admin Instructions Lauro Mcgee MD        pantoprazole (PROTONIX) EC tablet 40 mg  40 mg Oral BID AC Halima Cardoso MD   40 mg at 08/30/24 0533    polyethylene glycol (MIRALAX) Packet 17 g  17 g Oral Daily Lauro Mcgee MD        scopolamine (TRANSDERM) 72 hr patch 1 patch  1 patch Transdermal Once Jody Correa MD   1 patch at 08/28/24 0848    And    scopolamine (TRANSDERM-SCOP) Patch in Place   Transdermal Q8H Jody Correa MD        senna-docusate (SENOKOT-S/PERICOLACE) 8.6-50 MG per tablet 1 tablet  1 tablet Oral BID Lauro Mcgee MD   1 tablet at 08/29/24 2003    sodium chloride (PF) 0.9% PF flush 3 mL  3 mL Intracatheter Q8H  Lauro Mcgee MD        sodium chloride (PF) 0.9% PF flush 3 mL  3 mL Intracatheter Q8H Lauro Mcgee MD   3 mL at 08/29/24 2129      Total time spent 50 min

## 2024-08-30 NOTE — UTILIZATION REVIEW
Admission Status; Secondary Review Determination   Under the authority of the Utilization Management Committee, the utilization review process indicated a secondary review on Patricia Mckeon. The review outcome is based on review of the medical records, discussions with staff, and applying clinical experience noted on the date of the review.   (x) Inpatient Status Appropriate - This patient's medical care is consistent with medical management for inpatient care and reasonable inpatient medical practice.     RATIONALE FOR DETERMINATION   Patricia Mckeon is a 63 yr old female who presented on 8/28/24 for right total knee arthroplasty.  POD#1 she experienced dizziness and lightheadedness with physical therapy with BP drop to 63/39 and required 500cc bolus to new BP 97/50 but ongoing dizziness/symptoms thus another 500cc bolus given.  HGB was at 14.5 preop and 10.1 POD#1.  Now on POD#2 identified to have new DVT and evaluation given hypotension postop for PE being performed.  Needs anticoagulation in setting of recent joint replacement and HGB dropped already to 9.4 even prior to anticoagulation possible dilution but will be monitored with anticoagulation.  Furthermore, ongoing pain control issues with PO medication changes to hydromorphone and pain still 7/10 suspect complicated further due to DVT.  Patient with postop complication causing inability to discharge.    At the time of admission with the information available to the attending physician more than 2 nights Hospital complex care was anticipated, based on patient risk of adverse outcome if treated as outpatient and complex care required. Inpatient admission is appropriate based on the Medicare guidelines.   The information on this document is developed by the utilization review team in order for the business office to ensure compliance. This only denotes the appropriateness of proper admission status and does not reflect the quality of care rendered.   The  definitions of Inpatient Status and Observation Status used in making the determination above are those provided in the CMS Coverage Manual, Chapter 1 and Chapter 6, section 70.4.   Sincerely,   Ashlyn Tai MD  Utilization Review  Physician Advisor  [facility]

## 2024-08-30 NOTE — PLAN OF CARE
Patient vital signs are at baseline: Yes  Patient able to ambulate as they were prior to admission or with assist devices provided by therapies during their stay:  Yes  Patient MUST void prior to discharge:  Yes  Patient able to tolerate oral intake:  Yes  Pain has adequate pain control using Oral analgesics:  No,  IV Dilaudid given for severe pain  Does patient have an identified :  Yes  Has goal D/C date and time been discussed with patient:  Yes     Patient alert and oriented. VSS. RA. LR infusing at 75ml/hr.  Tolerating Regular diet. A1 with walker and gait belt. PRN IV dilaudid given for pain.   Still to have US of R leg. Calls appropriately. Call light within reach. Alarms on,

## 2024-08-31 VITALS
SYSTOLIC BLOOD PRESSURE: 111 MMHG | TEMPERATURE: 98.8 F | RESPIRATION RATE: 16 BRPM | OXYGEN SATURATION: 95 % | HEART RATE: 83 BPM | DIASTOLIC BLOOD PRESSURE: 55 MMHG

## 2024-08-31 LAB
HGB BLD-MCNC: 9.1 G/DL (ref 11.7–15.7)
HOLD SPECIMEN: NORMAL

## 2024-08-31 PROCEDURE — 85018 HEMOGLOBIN: CPT | Performed by: NURSE PRACTITIONER

## 2024-08-31 PROCEDURE — 250N000013 HC RX MED GY IP 250 OP 250 PS 637: Performed by: FAMILY MEDICINE

## 2024-08-31 PROCEDURE — 99232 SBSQ HOSP IP/OBS MODERATE 35: CPT | Performed by: INTERNAL MEDICINE

## 2024-08-31 PROCEDURE — 36415 COLL VENOUS BLD VENIPUNCTURE: CPT | Performed by: NURSE PRACTITIONER

## 2024-08-31 PROCEDURE — 250N000013 HC RX MED GY IP 250 OP 250 PS 637: Performed by: NURSE PRACTITIONER

## 2024-08-31 RX ORDER — METHOCARBAMOL 500 MG/1
500 TABLET, FILM COATED ORAL 3 TIMES DAILY PRN
Qty: 21 TABLET | Refills: 0 | Status: SHIPPED | OUTPATIENT
Start: 2024-08-31 | End: 2024-09-07

## 2024-08-31 RX ORDER — HYDROMORPHONE HYDROCHLORIDE 2 MG/1
2-4 TABLET ORAL EVERY 4 HOURS PRN
Qty: 26 TABLET | Refills: 0 | Status: SHIPPED | OUTPATIENT
Start: 2024-08-31

## 2024-08-31 RX ADMIN — FUROSEMIDE 20 MG: 20 TABLET ORAL at 08:27

## 2024-08-31 RX ADMIN — HYDROMORPHONE HYDROCHLORIDE 2 MG: 2 TABLET ORAL at 00:58

## 2024-08-31 RX ADMIN — ACETAMINOPHEN 975 MG: 325 TABLET ORAL at 05:47

## 2024-08-31 RX ADMIN — HYDROMORPHONE HYDROCHLORIDE 2 MG: 2 TABLET ORAL at 11:06

## 2024-08-31 RX ADMIN — APIXABAN 10 MG: 5 TABLET, FILM COATED ORAL at 08:27

## 2024-08-31 RX ADMIN — PANTOPRAZOLE SODIUM 40 MG: 40 TABLET, DELAYED RELEASE ORAL at 05:48

## 2024-08-31 RX ADMIN — HYDROMORPHONE HYDROCHLORIDE 2 MG: 2 TABLET ORAL at 05:48

## 2024-08-31 ASSESSMENT — ACTIVITIES OF DAILY LIVING (ADL)
ADLS_ACUITY_SCORE: 26
ADLS_ACUITY_SCORE: 27
ADLS_ACUITY_SCORE: 27
ADLS_ACUITY_SCORE: 26
ADLS_ACUITY_SCORE: 27
ADLS_ACUITY_SCORE: 26
ADLS_ACUITY_SCORE: 26
ADLS_ACUITY_SCORE: 27
ADLS_ACUITY_SCORE: 27
ADLS_ACUITY_SCORE: 26
ADLS_ACUITY_SCORE: 28
ADLS_ACUITY_SCORE: 28

## 2024-08-31 NOTE — DISCHARGE INSTRUCTIONS
[facility]  [id number]  [place] [address]  Phone # [phone]  [email or web address]  Urgent Care Location

## 2024-08-31 NOTE — PLAN OF CARE
Patient vital signs are at baseline: Yes, VSS.  Patient able to ambulate as they were prior to admission or with assist devices provided by therapies during their stay:  Yes, A x 1 using a walker and a gait belt.   Patient MUST void prior to discharge:  Yes  Patient able to tolerate oral intake:  Yes  Patient has adequate pain control using Oral analgesics:  Yes, Pain is being managed with scheduled Tylenol and PRN Dilaudid.   Does patient have an identified :  Yes  Has goal D/C date and time been discussed with patient:  Yes    CMS is intact. Denies numbness/tingling. Dressing is dry and intact. There is a large blister at the distal end of her dressing. PER MD note they are aware. Right calf is swollen, red, warm and painful. Pt was started on Eliquis for a DVT. She was also started on Lasix for pulmonary HTN. Both meds given this evening shift. Patient is expected to discharge tomorrow morning.

## 2024-08-31 NOTE — PROGRESS NOTES
orthopedics Progress Note      Post-operative Day: 3 Days Post-Op    Procedure(s):  RIGHT TOTAL KNEE ARTHROPLASTY      Subjective:  8/31/2024      Pain: moderate  Chest pain, SOB:  No      Objective:  Blood pressure 103/47, pulse 83, temperature 98.8  F (37.1  C), temperature source Oral, resp. rate 16, SpO2 95%, not currently breastfeeding.    Patient Vitals for the past 24 hrs:   BP Temp Temp src Pulse Resp SpO2   08/31/24 0752 -- 98.8  F (37.1  C) Oral 83 16 95 %   08/31/24 0018 103/47 98.5  F (36.9  C) Oral 84 17 94 %   08/30/24 1538 120/56 98  F (36.7  C) Oral 98 16 97 %   08/30/24 0800 136/63 98  F (36.7  C) Oral 90 16 92 %       Wt Readings from Last 4 Encounters:   08/21/24 112.5 kg (248 lb)   03/01/24 103.4 kg (228 lb)   10/06/23 103.4 kg (228 lb)   08/10/23 107 kg (236 lb)         Surgical extremity motor function, sensation, and circulation intact: No concerns she does have moderate calf swelling and pain.    Post op dressing intact.  Dressing intact she does have a moderate-sized blister at the distal lateral aspect of her incision and wound without surrounding erythema or cellulitis  Calf tenderness: Moderate calf pain and swelling    Pertinent Labs   Lab Results: personally reviewed.     Recent Labs   Lab Test 08/31/24  0614 08/30/24  0629 08/29/24  1205 08/29/24  0632 08/21/24  1224 08/21/24  1220 06/29/23  0605 06/08/23  1501   WBC  --   --   --  13.5*  --  7.3  --  8.0   HGB 9.1* 9.4* 10.1* 10.7*  --  14.5   < > 14.7   HCT  --   --   --  32.2*  --  45.3  --  43.3   MCV  --   --   --  90  --  95  --  90   PLT  --   --   --  242  --  280  --  312   NA  --   --   --  141 138  --   --  139    < > = values in this interval not displayed.       Plan: Anticoagulation protocol: Eliquis            Pain medications:  pain medication: dilaudid            Weight bearing status:  WBAT            Disposition: If the patient clears PT today she certainly can be discharged with a follow-up in 2  weeks.            Continue cares and rehabilitation     Report completed by:  Lauro Mcgee MD  Date: 8/31/2024  Time: 7:59 AM

## 2024-08-31 NOTE — PLAN OF CARE
Goal Outcome Evaluation:    Patient discharged to home with spouse. AVS and stoplight tool reviewed and answered all questions. Patient was sent with Dilaudid script and six others e-scribed to home pharmacy. Belongings gathered and patient assisted down to car in wheelchair with assistance from nursing staff.

## 2024-08-31 NOTE — PROGRESS NOTES
[facility] MEDICINE PROGRESS NOTE      Identification/Summary:   Patricia Mckeon is a 63 year old old female with history of Osteoarthritis, GERD, Dyslipidemia, Obesity, and Overactive Bladder, who presents to the PACU postoperatively for a right total knee arthroplasty. Patient felt lightheaded and dizzy during physical therapy on postop day 1.  Blood pressure was low.  Responded with IV hydration.  No history of hypertension or other blood pressure lowering medications.  Patient is up with ambulating well on postop day 2 without difficulty.  No further syncope.  Hemoglobin is stable at 9.4 from 14.5.    She had significant right calf pain.  Ultrasound revealed right peroneal vein DVT.  No history of DVT in the past.  Started on Eliquis for 3 months.  Chest CT scan is negative for pulmonary embolism.  Remains hemodynamically stable.      Assessment and Plan:  Deep Vein Thrombosis of Right Popliteal vein  Venous U/S done, exam positive for DVT of right popliteal vein.   Patient afebrile. VSS.   Initiate Eliquis 10 mg twice daily for 14 doses, then Eliquis 5 mg twice daily for 3-month  Chest CT scan is negative for PE     Near syncope on postop day 1.  No further recurrence  Symptomatic hypotension , resolved  Responded with IV hydration  BP remains stable    Bilateral leg edema right more than left  Chest CT-Mild pulmonary trunk enlargement; correlate for pulmonary hypertension. No medical signs of any pulmonary hypertension.  Discussed with patient and family to follow-up with PCP for further workup if indicated.      Acute blood loss anemia  Hemoglobin 9.4 from 14.5    GERD  Stable and asymptomatic  Continue PPI     Moderate obesity    Modification of lifestyle for weight loss  Phentermine 37.5 mg daily, on hold  Metformin 500 mg daily, on hold     Status post right total knee arthroplasty   Resume routine postoperative care  Physical and Occupational Therapy  Use incentive spirometry  frequently  Started on Eliquis for right leg DVT x 3 months  Pain control with Tylenol 975 mg every 8 hours, 650 mg every 4 hours as needed, po Dilaudid 2 to 4 mg every 4 hours as needed    Clinically Significant Risk Factors Present on Admission                # Drug Induced Platelet Defect: home medication list includes an antiplatelet medication      # Anemia: based on hgb <11                  Diet: Discharge Instruction - Regular Diet Adult  Trejo Catheter: Not present  Lines: None       DVT Prophylaxis: initiation of Eliquis 10 mg BID for current diagnosis of DVT of right popliteal vein.   Code Status: Prior    Medically ready for discharge:  today    Shanthi Cardoza MD  Hospitalist Service  [facility]  Securely message with [employer] (more info)  Text page via [employer] Paging/Directory         Interval History/Subjective:  Doing well from a pain perspective at rest.  Does have some pain with movement.  Was able to pass PT with stairs.  Still has blisters on the knee but per orthopedics is not unexpected.    Physical Exam/Objective:  Temp:  [98.5  F (36.9  C)-98.8  F (37.1  C)] 98.8  F (37.1  C)  Pulse:  [83-84] 83  Resp:  [16-17] 16  BP: (103-111)/(47-55) 111/55  SpO2:  [94 %-95 %] 95 %  There is no height or weight on file to calculate BMI.    GENERAL:  Alert, appears comfortable, in no acute distress, appears stated age   HEAD:  Normocephalic, without obvious abnormality, atraumatic   EYES:  conjunctiva/corneas clear, no scleral icterus, EOM's intact   NOSE: Nares normal, septum midline, mucosa normal, no drainage   THROAT: Lips, mucosa, and gums normal   NECK: Supple, symmetrical, trachea midline   BACK:   Symmetric, no curvature   LUNGS:   Clear to auscultation bilaterally, no rales, rhonchi, or wheezing, symmetric chest rise on inhalation, respirations unlabored   CHEST WALL:  No tenderness or deformity   HEART:  Regular rate and rhythm, S1 and S2 normal, no murmur, rub, or gallop     ABDOMEN:   Soft, non-tender, no masses, no organomegaly, no rebound or guarding   EXTREMITIES: Bilateral lower extremity edema, right more than left, that is post right knee arthroplasty   SKIN: No rashes in the visualized areas, engorged blister present on lateral side of R knee ner incision site   NEURO: Alert, moves all four extremities freely   PSYCH: Cooperative, behavior is appropriate      45 MINUTES SPENT BY ME on the date of service doing chart review, history, exam, documentation & further activities per the note.

## 2024-08-31 NOTE — PLAN OF CARE
Patient vital signs are at baseline: Yes  Patient able to ambulate as they were prior to admission or with assist devices provided by therapies during their stay:  Yes  Patient MUST void prior to discharge:  Yes  Patient able to tolerate oral intake:  Yes  Pain has adequate pain control using Oral analgesics:  Yes  Does patient have an identified :  Yes  Has goal D/C date and time been discussed with patient:  Yes     Alert and oriented x4. PO dilaudid 2 mg given overnight for pain. Effective per pt. Blister intact. Leg still swollen.     Problem: Adult Inpatient Plan of Care  Goal: Optimal Comfort and Wellbeing  Outcome: Progressing  Intervention: Monitor Pain and Promote Comfort  Recent Flowsheet Documentation  Taken 8/31/2024 0058 by Jose Figueroa, RN  Pain Management Interventions: medication (see MAR)     Problem: Knee Arthroplasty  Goal: Optimal Coping  Outcome: Progressing  Goal: Effective Urinary Elimination  Outcome: Progressing  Goal: Effective Oxygenation and Ventilation  Outcome: Progressing

## 2024-08-31 NOTE — PLAN OF CARE
Pt's right leg still swollen/red and very tender. Pt also has 4 blisters right below the dressing MD aware. Dressing is C/D/I. Report given to Sharmila De La Torre RN on 8/31/2024 at 9:20 AM

## 2024-09-02 NOTE — DISCHARGE SUMMARY
ORTHOPEDIC DISCHARGE SUMMARY       Patricia Mckeon,  1960, MRN [id number]    Admission Date: 2024  Admission Diagnoses: Osteoarthritis [M19.90]     Discharge Date: 2024     Post-operative Day:  3 Days Post-Op    Reason for Admission: The patient was admitted for the following:  Procedure(s):  RIGHT TOTAL KNEE ARTHROPLASTY      BRIEF HOSPITAL COURSE   This 63 year old female underwent the aforementioned procedure with Dr. Mcgee on 24. There were no intraoperative complications and the patient was transferred to the recovery room and later the orthopedic unit in stable condition. Once the patient reached the orthopedic floor our orthopedic pain protocol was implemented along with the following:    Anticoagulation Medications: Eliquis  Therapy: PT  Activity: WBAT  Bracing: None    Consultations during Admission: Hospitalist service for medical management     COMPLICATIONS/SIGNIFICANT FINDINGS    DVT    DISCHARGE INFORMATION   Condition at discharge: Good  Discharge destination: Home  Patient was seen by  Dr. Mcgee  on the date of discharge.    FOLLOW UP CARE   Follow up with orthopedics in 2 weeks or sooner should the need arise. Ortho will continue to manage pain control, post op anticoagulation and incision care.     Follow up with your PCP for management of chronic medical problems and to evaluate for post op medical complications including constipation, nausea/vomiting, DVT/PE, anemia, changes in blood pressure, fevers/chills, urinary retention and atelectasis/pneumonia.     /55 (BP Location: Left arm, Cuff Size: Adult Large)   Pulse 83   Temp 98.8  F (37.1  C) (Oral)   Resp 16   LMP  (LMP Unknown)   SpO2 95%     Pertinent Results at Discharge     Hemoglobin   Date/Time Value Ref Range Status   2024 06:14 AM 9.1 (L) 11.7 - 15.7 g/dL Final   2024 06:29 AM 9.4 (L) 11.7 - 15.7 g/dL Final   2024 12:05 PM 10.1 (L) 11.7 - 15.7 g/dL Final     Platelet Count    Date/Time Value Ref Range Status   08/29/2024 06:32  150 - 450 10e3/uL Final   08/21/2024 12:20  150 - 450 10e3/uL Final   06/08/2023 03:01  150 - 450 10e3/uL Final       Problem List   Active Problems:    Morbid obesity (H)    OA (osteoarthritis)    Bilateral leg edema    Primary osteoarthritis of right knee    Acute deep vein thrombosis (DVT) of proximal vein of lower extremity, unspecified laterality (H)        Judi Alvarado PA-C  Date: 9/2/2024  Time: 10:50 AM

## 2024-09-06 ENCOUNTER — VIRTUAL VISIT (OUTPATIENT)
Dept: SURGERY | Facility: CLINIC | Age: 64
End: 2024-09-06
Payer: COMMERCIAL

## 2024-09-06 VITALS — WEIGHT: 242 LBS | BODY MASS INDEX: 41.32 KG/M2 | HEIGHT: 64 IN

## 2024-09-06 DIAGNOSIS — E66.01 SEVERE OBESITY (BMI >= 40) (H): Primary | ICD-10-CM

## 2024-09-06 DIAGNOSIS — I82.4Y1 DEEP VEIN THROMBOSIS (DVT) OF PROXIMAL VEIN OF RIGHT LOWER EXTREMITY, UNSPECIFIED CHRONICITY (H): ICD-10-CM

## 2024-09-06 PROCEDURE — 99213 OFFICE O/P EST LOW 20 MIN: CPT | Mod: 95 | Performed by: FAMILY MEDICINE

## 2024-09-06 ASSESSMENT — PAIN SCALES - GENERAL: PAINLEVEL: MODERATE PAIN (5)

## 2024-09-06 NOTE — NURSING NOTE
Current patient location: [address] [place] [id number]    Is the patient currently in the state of [place]? YES    Visit mode:VIDEO    If the visit is dropped, the patient can be reconnected by: VIDEO VISIT: Text to cell phone:   Telephone Information:   Mobile [phone]       Will anyone else be joining the visit? NO  (If patient encounters technical issues they should call [phone]    How would you like to obtain your AVS? MyChart    Are changes needed to the allergy or medication list? No    Are refills needed on medications prescribed by this physician? NO    Rooming Documentation:  Questionnaire(s) completed      Reason for visit: RECHECK    Pt states 5/10 knee pain due to knee replacement Right knee last week.  Pt states developed blood clot in right calf.    Lane MENDOZA

## 2024-09-06 NOTE — LETTER
9/6/2024      Patricia Mckeon  [address] [id number]      Dear Colleague,    Thank you for referring your patient, Patricia Mckeon, to the [facility] [place]. Please see a copy of my visit note below.    Virtual Visit Details    Type of service:  Video Visit   Video Start Time: 10:40 AM  Video End Time: 11:00    Originating Location (pt. Location): Home    Distant Location (provider location):  On-site  Platform used for Video Visit: [facility]    Bariatric Follow-up    63 year old  female BMI:Body mass index is 41.54 kg/m .    Weight:   Wt Readings from Last 1 Encounters:   09/06/24 109.8 kg (242 lb)    pounds    Comorbidities:  Patient Active Problem List   Diagnosis     Gastroesophageal reflux disease without esophagitis     Dyslipidemia     Morbid obesity (H)     OA (osteoarthritis)     Bilateral leg edema     Primary osteoarthritis of right knee     Acute deep vein thrombosis (DVT) of proximal vein of lower extremity, unspecified laterality (H)     DVT (deep venous thrombosis) (H)         Current Outpatient Medications:      acetaminophen (TYLENOL) 325 MG tablet, Take 3 tablets (975 mg) by mouth every 6 hours., Disp: , Rfl:      apixaban ANTICOAGULANT (ELIQUIS) 5 MG tablet, 10 mg bid for 13 doses then 5 mg bid for 3 month, Disp: 70 tablet, Rfl: 2     aspirin 81 MG EC tablet, Take 1 tablet (81 mg) by mouth 2 times daily., Disp: 60 tablet, Rfl: 0     calcium carbonate-vitamin D (CALTRATE) 600-10 MG-MCG per tablet, Take 1 tablet by mouth daily., Disp: , Rfl:      cholecalciferol (VITAMIN D3) 125 mcg (5000 units) capsule, Take 1 capsule by mouth daily, Disp: , Rfl:      furosemide (LASIX) 20 MG tablet, Take 1 tablet (20 mg) by mouth daily for 4 days. Do not start before August 31, 2024., Disp: 4 tablet, Rfl: 0     garlic 150 MG TABS tablet, Take 150 mg by mouth daily., Disp: , Rfl:      HYDROmorphone (DILAUDID) 2 MG tablet, Take 1-2 tablets (2-4 mg) by  mouth every 4 hours as needed for severe pain., Disp: 26 tablet, Rfl: 0     metFORMIN (GLUCOPHAGE XR) 500 MG 24 hr tablet, Take 1 tablet (500 mg) by mouth daily with food, Disp: 90 tablet, Rfl: 3     methocarbamol (ROBAXIN) 500 MG tablet, Take 1 tablet (500 mg) by mouth 3 times daily as needed for muscle spasms., Disp: 21 tablet, Rfl: 0     Omega-3 Fatty Acids (FISH OIL) 1360 MG CAPS, Take 1 capsule by mouth daily., Disp: , Rfl:      omeprazole (PRILOSEC) 20 MG capsule, Take 20 mg by mouth daily., Disp: , Rfl:      phentermine (ADIPEX-P) 37.5 MG tablet, Take 37.5 mg by mouth every morning (before breakfast)., Disp: , Rfl:      potassium chloride ER (K-TAB) 20 MEQ CR tablet, Take 1 tablet (20 mEq) by mouth daily., Disp: 5 tablet, Rfl: 0     senna-docusate (SENOKOT-S/PERICOLACE) 8.6-50 MG tablet, Take 1 tablet by mouth 2 times daily as needed for constipation., Disp: 42 tablet, Rfl: 0     vitamin C (ASCORBIC ACID) 1000 MG TABS, Take 1,000 mg by mouth daily, Disp: , Rfl:        Interim: had her knee replaced and had post op DVT and will be on anticoagulants for 3 months. Her knee rehab is doing well. Taking 2 ASA and eliquis BID. Taking Ca++ with d and D3. Protein intake has gone down d/t 2 vacations and 2 [facility] parties and a wedding. Trying ot get protein in. Others are bringing food. Maintaining a 93# weight loss. Still taking phentermine and metformin. Has a bunionectomy and OA fusion. Then shoulder    Cholesterol   Date Value Ref Range Status   06/08/2023 206 (H) <200 mg/dL Final      Hemoglobin A1C   Date Value Ref Range Status   10/06/2022 5.1 0.0 - 5.6 % Final     Comment:     Normal <5.7%   Prediabetes 5.7-6.4%    Diabetes 6.5% or higher     Note: Adopted from ADA consensus guidelines.      BP Readings from Last 3 Encounters:   08/31/24 111/55   08/21/24 132/80   02/26/24 (!) 145/84        Plan:  DIET Stay intentional with Calcium, vitamin D and protein   EXERCISE PT knee   PHARMACOTHERAPY continue  phentermine and metformin    Discussed importance of compression stockings and intermittent walking with future travel car or plane      -We reviewed her medications and whether associated with weight gain. Follow up 6 months        We discussed [facility] Bariatric Basics including:  -eating 3 meals daily  -eating protein first  -eating slowly, chewing food well  -avoiding/limiting calorie containing beverages  -choosing wheat, not white with breads, crackers, pastas, rickie, bagels, tortillas, rice  -limiting restaurant or cafeteria eating to twice a week or less  -We discussed the importance of restorative sleep and stress management in maintaining a healthy weight.  -We discussed insulin resistance and glycemic index as it relates to appetite and weight control  -We discussed the National Weight Control Registry healthy weight maintenance strategies and ways to optimize metabolism.  -We discussed the importance of physical activity including cardiovascular and strength training in maintaining a healthier weight and explored viable options.      DIETARY HISTORY  Meals Per Day: 3  Eating Protein First?: yes  Food Diary: B:cottage cheese and fruit L:chicken salad sandwich on WW bread, cucumbers  D:same as lunch  Snacks Per Day: no  Typical Snack:   Fluid Intake: intentional-slacking  Portion Control: stable  Calorie Containing Beverages: no  Alcohol per week: none  Typical Protein Food Choices: lean  Choosing Whole Grains: yes  Grocery Shopping is done by: her   Food Preparation is done by: herself  Meals at Restaurant/Cafeteria/Take Out Per Week: no  Eating at the Table:   TV is Off During Meals:     Positive Changes Since Last Visit: maintaining excellent nutrition  Struggling With: ambulalation    Knowledgeable in Reading Food Labels: yes  Getting Adequate Protein: yes  Sleeping 7-8 hours/day yes 6 hours is good for her  Stress management doing well    PHYSICAL ACTIVITY PATTERNS:  Cardiovascular: PT  "for her knee replacement  Strength Training: PT    REVIEW OF SYSTEMS  As above    PHYSICAL EXAM: (most recent vitals and today's stated weight)  Vitals: Ht 1.626 m (5' 4\")   Wt 109.8 kg (242 lb)   LMP  (LMP Unknown)   BMI 41.54 kg/m        GEN: Pleasant and in no acute distress  PSYCH: A&OX3,     I have reviewed the note as documented above.  This accurately captures the substance of my conversation with the patient.  Thank you for the opportunity to participate in the care of your patient.    Hetal Uribe MD, FAAFP  [facility]  Diplomate, American Board of Obesity Medicine    Total time spent on the date of this encounter doing: chart review, review of test results, patient visit, physical exam, education, counseling, developing plan of care, and documenting = 20 minutes.        Again, thank you for allowing me to participate in the care of your patient.        Sincerely,        Hetal Uribe MD  "

## 2024-09-06 NOTE — PROGRESS NOTES
Virtual Visit Details    Type of service:  Video Visit   Video Start Time: 10:40 AM  Video End Time: 11:00    Originating Location (pt. Location): Home    Distant Location (provider location):  On-site  Platform used for Video Visit: [employer]    Bariatric Follow-up    63 year old  female BMI:Body mass index is 41.54 kg/m .    Weight:   Wt Readings from Last 1 Encounters:   09/06/24 109.8 kg (242 lb)    pounds    Comorbidities:  Patient Active Problem List   Diagnosis    Gastroesophageal reflux disease without esophagitis    Dyslipidemia    Morbid obesity (H)    OA (osteoarthritis)    Bilateral leg edema    Primary osteoarthritis of right knee    Acute deep vein thrombosis (DVT) of proximal vein of lower extremity, unspecified laterality (H)    DVT (deep venous thrombosis) (H)         Current Outpatient Medications:     acetaminophen (TYLENOL) 325 MG tablet, Take 3 tablets (975 mg) by mouth every 6 hours., Disp: , Rfl:     apixaban ANTICOAGULANT (ELIQUIS) 5 MG tablet, 10 mg bid for 13 doses then 5 mg bid for 3 month, Disp: 70 tablet, Rfl: 2    aspirin 81 MG EC tablet, Take 1 tablet (81 mg) by mouth 2 times daily., Disp: 60 tablet, Rfl: 0    calcium carbonate-vitamin D (CALTRATE) 600-10 MG-MCG per tablet, Take 1 tablet by mouth daily., Disp: , Rfl:     cholecalciferol (VITAMIN D3) 125 mcg (5000 units) capsule, Take 1 capsule by mouth daily, Disp: , Rfl:     furosemide (LASIX) 20 MG tablet, Take 1 tablet (20 mg) by mouth daily for 4 days. Do not start before August 31, 2024., Disp: 4 tablet, Rfl: 0    garlic 150 MG TABS tablet, Take 150 mg by mouth daily., Disp: , Rfl:     HYDROmorphone (DILAUDID) 2 MG tablet, Take 1-2 tablets (2-4 mg) by mouth every 4 hours as needed for severe pain., Disp: 26 tablet, Rfl: 0    metFORMIN (GLUCOPHAGE XR) 500 MG 24 hr tablet, Take 1 tablet (500 mg) by mouth daily with food, Disp: 90 tablet, Rfl: 3    methocarbamol (ROBAXIN) 500 MG tablet, Take 1 tablet (500 mg) by mouth 3 times daily as  needed for muscle spasms., Disp: 21 tablet, Rfl: 0    Omega-3 Fatty Acids (FISH OIL) 1360 MG CAPS, Take 1 capsule by mouth daily., Disp: , Rfl:     omeprazole (PRILOSEC) 20 MG capsule, Take 20 mg by mouth daily., Disp: , Rfl:     phentermine (ADIPEX-P) 37.5 MG tablet, Take 37.5 mg by mouth every morning (before breakfast)., Disp: , Rfl:     potassium chloride ER (K-TAB) 20 MEQ CR tablet, Take 1 tablet (20 mEq) by mouth daily., Disp: 5 tablet, Rfl: 0    senna-docusate (SENOKOT-S/PERICOLACE) 8.6-50 MG tablet, Take 1 tablet by mouth 2 times daily as needed for constipation., Disp: 42 tablet, Rfl: 0    vitamin C (ASCORBIC ACID) 1000 MG TABS, Take 1,000 mg by mouth daily, Disp: , Rfl:        Interim: had her knee replaced and had post op DVT and will be on anticoagulants for 3 months. Her knee rehab is doing well. Taking 2 ASA and eliquis BID. Taking Ca++ with d and D3. Protein intake has gone down d/t 2 vacations and 2 residential parties and a wedding. Trying ot get protein in. Others are bringing food. Maintaining a 93# weight loss. Still taking phentermine and metformin. Has a bunionectomy and OA fusion. Then shoulder    Cholesterol   Date Value Ref Range Status   06/08/2023 206 (H) <200 mg/dL Final      Hemoglobin A1C   Date Value Ref Range Status   10/06/2022 5.1 0.0 - 5.6 % Final     Comment:     Normal <5.7%   Prediabetes 5.7-6.4%    Diabetes 6.5% or higher     Note: Adopted from ADA consensus guidelines.      BP Readings from Last 3 Encounters:   08/31/24 111/55   08/21/24 132/80   02/26/24 (!) 145/84        Plan:  DIET Stay intentional with Calcium, vitamin D and protein   EXERCISE PT knee   PHARMACOTHERAPY continue phentermine and metformin    Discussed importance of compression stockings and intermittent walking with future travel car or plane      -We reviewed her medications and whether associated with weight gain. Follow up 6 months        We discussed [facility] Bariatric Basics including:  -eating  "3 meals daily  -eating protein first  -eating slowly, chewing food well  -avoiding/limiting calorie containing beverages  -choosing wheat, not white with breads, crackers, pastas, rickie, bagels, tortillas, rice  -limiting restaurant or cafeteria eating to twice a week or less  -We discussed the importance of restorative sleep and stress management in maintaining a healthy weight.  -We discussed insulin resistance and glycemic index as it relates to appetite and weight control  -We discussed the National Weight Control Registry healthy weight maintenance strategies and ways to optimize metabolism.  -We discussed the importance of physical activity including cardiovascular and strength training in maintaining a healthier weight and explored viable options.      DIETARY HISTORY  Meals Per Day: 3  Eating Protein First?: yes  Food Diary: B:cottage cheese and fruit L:chicken salad sandwich on WW bread, cucumbers  D:same as lunch  Snacks Per Day: no  Typical Snack:   Fluid Intake: intentional-slacking  Portion Control: stable  Calorie Containing Beverages: no  Alcohol per week: none  Typical Protein Food Choices: lean  Choosing Whole Grains: yes  Grocery Shopping is done by: her   Food Preparation is done by: herself  Meals at Restaurant/Cafeteria/Take Out Per Week: no  Eating at the Table:   TV is Off During Meals:     Positive Changes Since Last Visit: maintaining excellent nutrition  Struggling With: ambulalation    Knowledgeable in Reading Food Labels: yes  Getting Adequate Protein: yes  Sleeping 7-8 hours/day yes 6 hours is good for her  Stress management doing well    PHYSICAL ACTIVITY PATTERNS:  Cardiovascular: PT for her knee replacement  Strength Training: PT    REVIEW OF SYSTEMS  As above    PHYSICAL EXAM: (most recent vitals and today's stated weight)  Vitals: Ht 1.626 m (5' 4\")   Wt 109.8 kg (242 lb)   LMP  (LMP Unknown)   BMI 41.54 kg/m        GEN: Pleasant and in no acute distress  PSYCH: A&OX3, "     I have reviewed the note as documented above.  This accurately captures the substance of my conversation with the patient.  Thank you for the opportunity to participate in the care of your patient.    Hetal Uribe MD, FAAFP  [facility]  Diplomate, American Board of Obesity Medicine    Total time spent on the date of this encounter doing: chart review, review of test results, patient visit, physical exam, education, counseling, developing plan of care, and documenting = 20 minutes.

## 2024-10-14 ENCOUNTER — PATIENT OUTREACH (OUTPATIENT)
Dept: CARE COORDINATION | Facility: CLINIC | Age: 64
End: 2024-10-14
Payer: COMMERCIAL

## 2024-11-10 ENCOUNTER — HEALTH MAINTENANCE LETTER (OUTPATIENT)
Age: 64
End: 2024-11-10

## 2024-11-22 ENCOUNTER — TELEPHONE (OUTPATIENT)
Dept: FAMILY MEDICINE | Facility: CLINIC | Age: 64
End: 2024-11-22
Payer: COMMERCIAL

## 2024-11-22 NOTE — TELEPHONE ENCOUNTER
Patricia states she had knee surgery 8/28 and ended up developing a blood clot. She was placed on eliquis and told to stay on it for 3 months. She states her 3 months is this coming Monday, and wondering if she needs to continue or ok to stop.     Pt states the hospitalist was the original prescriber.

## 2024-11-25 NOTE — TELEPHONE ENCOUNTER
On review of discharge note, looks like three months is what was recommended. If she has had another blood clot at any point in her life we would want to reevaluate this, but likely secondary to surgery. If she has concerns and would like to discuss, or has had a blood clot previously, recommend apt with me. Okay to override.

## 2024-11-26 NOTE — TELEPHONE ENCOUNTER
Pt returned call and informed of PCP recommendations regarding Eliquis. No history of blood clots, and no concerns per pt.     Pt is wondering if she can resume her ibuprofen now that she is finished the full course of her Eliquis.    Pt request [employer] message for response.    LIGIA Vallecillo.

## 2024-11-26 NOTE — TELEPHONE ENCOUNTER
Left message for patient to call back. Please relay message from Wale Blair and assist as needed.

## 2025-01-04 ENCOUNTER — NURSE TRIAGE (OUTPATIENT)
Dept: NURSING | Facility: CLINIC | Age: 65
End: 2025-01-04
Payer: COMMERCIAL

## 2025-01-04 NOTE — TELEPHONE ENCOUNTER
COVID Positive/Requesting COVID treatment    Patient is positive for COVID and requesting treatment options.    Date of positive COVID test (PCR or at home)? 1/4/2025   Current COVID symptoms: cough, fatigue, muscle or body aches, headache, sore throat, and congestion or runny nose  Date COVID symptoms began: 1/1/2025    Message should be routed to clinic RN pool. Best phone number to use for call back: [phone]    Nurse Triage SBAR    Is this a 2nd Level Triage? NO    Situation: Patient calling.     Background: Covid.     Assessment: Covid positive.  Asking about paxlovid. cough, fatigue, muscle or body aches, headache, sore throat, and congestion or runny nose. No fever today. No difficulty breathing.    Protocol Recommended Disposition:   Call PCP Within 24 Hours    Recommendation: Will route encounter to covid nurse pool           Does the patient meet one of the following criteria for ADS visit consideration? 16+ years old, with an [facility] PCP     TIP  Providers, please consider if this condition is appropriate for management at one of our Acute and Diagnostic Services sites.     If patient is a good candidate, please use dotphrase <dot>triageresponse and select Refer to ADS to document.    Reason for Disposition   [1] HIGH RISK patient (e.g., weak immune system, age > 64 years, obesity with BMI 30 or higher, pregnant, chronic lung disease) AND [2] COVID symptoms (e.g., cough, fever)  (Exceptions: Already seen by PCP and no new or worsening symptoms.)    Additional Information   Negative: SEVERE difficulty breathing (e.g., struggling for each breath, speaks in single words)   Negative: Difficult to awaken or acting confused (e.g., disoriented, slurred speech)   Negative: Bluish (or gray) lips or face now   Negative: Shock suspected (e.g., cold/pale/clammy skin, too weak to stand, low BP, rapid pulse)   Negative: Sounds like a life-threatening emergency to the triager   Negative: SEVERE or constant chest  pain or pressure  (Exception: Mild central chest pain, present only when coughing.)   Negative: MODERATE difficulty breathing (e.g., speaks in phrases, SOB even at rest, pulse 100-120)   Negative: [1] Headache AND [2] stiff neck (can't touch chin to chest)   Negative: Oxygen level (e.g., pulse oximetry) 90% or lower   Negative: Chest pain or pressure  (Exception: MILD central chest pain, present only when coughing.)   Negative: [1] Drinking very little AND [2] dehydration suspected (e.g., no urine > 12 hours, very dry mouth, very lightheaded)   Negative: Patient sounds very sick or weak to the triager   Negative: MILD difficulty breathing (e.g., minimal/no SOB at rest, SOB with walking, pulse <100)   Negative: Fever > 103 F (39.4 C)   Negative: [1] Fever > 101 F (38.3 C) AND [2] age > 60 years   Negative: [1] Fever > 100 F (37.8 C) AND [2] bedridden (e.g., CVA, chronic illness, recovering from surgery)   Negative: Oxygen level (e.g., pulse oximetry) 91 to 94%    Protocols used: COVID-19 - Diagnosed or [id number]

## 2025-01-06 NOTE — TELEPHONE ENCOUNTER
Left message to call back on identified voicemail.    Patient can submit an Evisit for Covid treatment  Patient can schedule a virtual visit with a provider to discuss Covid treatment

## 2025-01-06 NOTE — TELEPHONE ENCOUNTER
RN COVID TREATMENT VISIT  01/06/25      The patient has been triaged and does not require a higher level of care.    Patricia Mckeon  64 year old  Current weight? 242 lbs     Has the patient been seen by a primary care or specialty provider at a [facility] within the past three years? Yes.   Have you been in close proximity to/do you have a known exposure to a person with a confirmed case of influenza? No.     General treatment eligibility:  Date of positive COVID test (PCR or at home)?  1/4/25    Are you or have you been hospitalized for this COVID-19 infection? No.   Have you received monoclonal antibodies or antiviral treatment for COVID-19 since this positive test? No.   Do you have any of the following conditions that place you at risk of being very sick from COVID-19?   - Age 50 or older  Yes, patient has at least one high risk condition as noted above.     Current COVID symptoms:   - cough  - fatigue  - muscle or body aches  - headache  - sore throat  - congestion or runny nose  Yes. Patient has at least one symptom as selected.     How many days since symptoms started? 5 days or less. Established patient, 12 years or older weighing at least 88.2 lbs, who has symptoms that started in the past 5 days, has not been hospitalized nor received treatment already, and is at risk for being very sick from COVID-19.     Treatment eligibility by RN:  Are you currently pregnant or nursing? No  Do you have a clinically significant hypersensitivity to nirmatrelvir or ritonavir, or toxic epidermal necrolysis (TEN) or Saunders-Stevan Syndrome? No  Do you have a history of hepatitis, any hepatic impairment on the Problem List (such as Child-Hernandez Class C, cirrhosis, fatty liver disease, alcoholic liver disease), or was the last liver lab (hepatic panel, ALT, AST, ALK Phos, bilirubin) elevated in the past 6 months?   Do you have any history of severe renal impairment (eGFR < 30mL/min)? No    Is patient  eligible to continue? Yes, patient meets all eligibility requirements for the RN COVID treatment (as denoted by all no responses above).     Current Outpatient Medications   Medication Sig Dispense Refill    acetaminophen (TYLENOL) 325 MG tablet Take 3 tablets (975 mg) by mouth every 6 hours.      apixaban ANTICOAGULANT (ELIQUIS) 5 MG tablet 10 mg bid for 13 doses then 5 mg bid for 3 month 70 tablet 2    calcium carbonate-vitamin D (CALTRATE) 600-10 MG-MCG per tablet Take 1 tablet by mouth daily.      cholecalciferol (VITAMIN D3) 125 mcg (5000 units) capsule Take 1 capsule by mouth daily      furosemide (LASIX) 20 MG tablet Take 1 tablet (20 mg) by mouth daily for 4 days. Do not start before August 31, 2024. 4 tablet 0    garlic 150 MG TABS tablet Take 150 mg by mouth daily.      HYDROmorphone (DILAUDID) 2 MG tablet Take 1-2 tablets (2-4 mg) by mouth every 4 hours as needed for severe pain. 26 tablet 0    metFORMIN (GLUCOPHAGE XR) 500 MG 24 hr tablet Take 1 tablet (500 mg) by mouth daily with food 90 tablet 3    Omega-3 Fatty Acids (FISH OIL) 1360 MG CAPS Take 1 capsule by mouth daily.      omeprazole (PRILOSEC) 20 MG capsule Take 20 mg by mouth daily.      phentermine (ADIPEX-P) 37.5 MG tablet Take 37.5 mg by mouth every morning (before breakfast).      potassium chloride ER (K-TAB) 20 MEQ CR tablet Take 1 tablet (20 mEq) by mouth daily. 5 tablet 0    senna-docusate (SENOKOT-S/PERICOLACE) 8.6-50 MG tablet Take 1 tablet by mouth 2 times daily as needed for constipation. 42 tablet 0    vitamin C (ASCORBIC ACID) 1000 MG TABS Take 1,000 mg by mouth daily         Medications from List 1 of the standing order (on medications that exclude the use of Paxlovid) that patient is taking: NONE.   Is patient taking any meds from List 1? No.   Medications from List 2 of the standing order (on meds that provider needs to adjust) that patient is taking: apixaban (Eliquis), explained a provider visit is necessary to discuss  medication adjustments while taking Paxlovid. Is patient on any of the meds from List 2? Yes. Patient will be scheduled or transferred to a  at the end of this call.

## 2025-02-06 ENCOUNTER — PATIENT OUTREACH (OUTPATIENT)
Dept: CARE COORDINATION | Facility: CLINIC | Age: 65
End: 2025-02-06
Payer: COMMERCIAL

## 2025-02-17 DIAGNOSIS — E66.01 MORBID OBESITY (H): ICD-10-CM

## 2025-02-18 RX ORDER — PHENTERMINE HYDROCHLORIDE 37.5 MG/1
TABLET ORAL
Qty: 90 TABLET | Refills: 1 | Status: SHIPPED | OUTPATIENT
Start: 2025-02-18

## 2025-06-02 ENCOUNTER — OFFICE VISIT (OUTPATIENT)
Dept: FAMILY MEDICINE | Facility: CLINIC | Age: 65
End: 2025-06-02
Payer: COMMERCIAL

## 2025-06-02 VITALS
BODY MASS INDEX: 44.32 KG/M2 | WEIGHT: 259.6 LBS | HEIGHT: 64 IN | SYSTOLIC BLOOD PRESSURE: 141 MMHG | TEMPERATURE: 97.7 F | OXYGEN SATURATION: 99 % | HEART RATE: 85 BPM | DIASTOLIC BLOOD PRESSURE: 76 MMHG | RESPIRATION RATE: 18 BRPM

## 2025-06-02 DIAGNOSIS — H10.11 ALLERGIC CONJUNCTIVITIS OF RIGHT EYE: Primary | ICD-10-CM

## 2025-06-02 PROCEDURE — 3077F SYST BP >= 140 MM HG: CPT

## 2025-06-02 PROCEDURE — 99213 OFFICE O/P EST LOW 20 MIN: CPT

## 2025-06-02 PROCEDURE — 3078F DIAST BP <80 MM HG: CPT

## 2025-06-02 RX ORDER — CETIRIZINE HYDROCHLORIDE 10 MG/1
10 TABLET ORAL DAILY
Qty: 30 TABLET | Refills: 0 | Status: SHIPPED | OUTPATIENT
Start: 2025-06-02

## 2025-06-02 RX ORDER — KETOTIFEN FUMARATE 0.35 MG/ML
SOLUTION/ DROPS OPHTHALMIC
Qty: 5 ML | Refills: 2 | Status: SHIPPED | OUTPATIENT
Start: 2025-06-02

## 2025-06-02 ASSESSMENT — ENCOUNTER SYMPTOMS: EYE PAIN: 1

## 2025-06-02 NOTE — PATIENT INSTRUCTIONS
Artificial tears and warm compress 4 times per day. If not improving or worsening.   Schedule eye exam. Call and ask to speak with a nurse if unable to get in.    Pinkeye: Care Instructions  Overview     Pinkeye is redness and swelling of the eye surface and the conjunctiva (the lining of the eyelid and the covering of the white part of the eye). Pinkeye is also called conjunctivitis. Pinkeye is often caused by infection with bacteria or a virus. Dry air, allergies, smoke, and chemicals are other common causes.  Pinkeye often gets better on its own in 7 to 10 days. Antibiotics only help if the pinkeye is caused by bacteria. Pinkeye caused by infection spreads easily. If an allergy or chemical is causing pinkeye, it will not go away unless you can avoid whatever is causing it.  Follow-up care is a key part of your treatment and safety. Be sure to make and go to all appointments, and call your doctor if you are having problems. It's also a good idea to know your test results and keep a list of the medicines you take.  How can you care for yourself at home?  Wash your hands often. Always wash them before and after you treat pinkeye or touch your eyes or face.  Use moist cotton or a clean, wet cloth to remove crust. Wipe from the inside corner of the eye to the outside. Use a clean part of the cloth for each wipe.  Put cold or warm wet cloths on your eye a few times a day if the eye hurts.  Do not wear contact lenses or eye makeup until the pinkeye is gone. Throw away any eye makeup you were using when you got pinkeye. Clean your contacts and storage case. If you wear disposable contacts, use a new pair when your eye has cleared and it is safe to wear contacts again.  If the doctor gave you antibiotic ointment or eyedrops, use them as directed. Use the medicine for as long as instructed, even if your eye starts looking better soon. Keep the bottle tip clean, and do not let it touch the eye area.  To put in eyedrops or  "ointment:  Tilt your head back, and pull your lower eyelid down with one finger.  Drop or squirt the medicine inside the lower lid.  Close your eye for 30 to 60 seconds to let the drops or ointment move around.  Do not touch the ointment or dropper tip to your eyelashes or any other surface.  Do not share towels, pillows, or washcloths while you have pinkeye.  When should you call for help?   Call your doctor now or seek immediate medical care if:    You have pain in your eye, not just irritation on the surface.     You have a change in vision or loss of vision.     You have an increase in discharge from the eye.     Your eye has not started to improve or begins to get worse within 48 hours after you start using antibiotics.     Pinkeye lasts longer than 7 days.   Watch closely for changes in your health, and be sure to contact your doctor if you have any problems.  Where can you learn more?  Go to https://www.7write.net/patiented  Enter Y392 in the search box to learn more about \"Pinkeye: Care Instructions.\"  Current as of: July 31, 2024  Content Version: 14.4    3530-8533 [employer].   Care instructions adapted under license by your healthcare professional. If you have questions about a medical condition or this instruction, always ask your healthcare professional. [employer] disclaims any warranty or liability for your use of this information.    "

## 2025-06-02 NOTE — PROGRESS NOTES
"  Assessment & Plan     Allergic conjunctivitis of right eye  There is no purulent discharge or crusting reported so I am less concerned about a bacterial conjunctivitis. More likely an allergen or irritant though I cannot see any foreign objects to remove. If not improving will have her see eye specialist. She is due for annual exam anyway.  - ketotifen fumarate 0.035% 0.035 % SOLN ophthalmic solution  Dispense: 5 mL; Refill: 2  - cetirizine (ZYRTEC) 10 MG tablet  Dispense: 30 tablet; Refill: 0  - Adult Eye  Referral            BMI  Estimated body mass index is 44.56 kg/m  as calculated from the following:    Height as of this encounter: 1.626 m (5' 4\").    Weight as of this encounter: 117.8 kg (259 lb 9.6 oz).         Follow-up       Subjective   Patricia is a 64 year old, presenting for the following health issues:  Eye Problem (/) and Conjunctivitis        6/2/2025    12:49 PM   Additional Questions   Roomed by Urban TRIMBLE MA   Accompanied by Self     Eye Problem     History of Present Illness       Reason for visit:  Something got in my eye while gardening and now the outer edge hurts and the inner edge itches and its red and this morning it was goopy  Symptom onset:  3-7 days ago  Symptoms include:  Hurt, itchy, goopy  Symptom intensity:  Moderate  Symptom progression:  Staying the same  Had these symptoms before:  No  What makes it worse:  No  What makes it better:  No   She is taking medications regularly.      Started last Thursday, after gardening, discomfort has not worsened but it is not getting better. There was no obvious identifiable cause of the irritation that she noticed while gardening but started to notice discomfort during dinner afterwards. The eye is very itchy and red and putting out drainage. R eyelid has a tiny lump that she says feels bruised and this is where the pain/irritation is the worst. Woke up with new purulent drainage from R eye and was unable to open R eye this am. L eye " "started feeling scratchy today and is now also putting out minimal drainage. Patient has tried using warm wet cloths, which helps the itchiness. Tried using some cooling/ soothing eye drops, which made her eyes burn and she threw them away. Denies use of contacts.             Objective    BP (!) 141/76 (BP Location: Right arm, Patient Position: Chair, Cuff Size: Adult Large)   Pulse 85   Temp 97.7  F (36.5  C) (Oral)   Resp 18   Ht 1.626 m (5' 4\")   Wt 117.8 kg (259 lb 9.6 oz)   LMP  (LMP Unknown)   SpO2 99%   Breastfeeding No   BMI 44.56 kg/m    Body mass index is 44.56 kg/m .  Physical Exam   GENERAL: alert and no distress  EYES: Eyes grossly normal to inspection other than right upper eyelid with mild drooping/edema lateral edge. Exam with flourescein stain normal.   RESP: even, unlabored, no cough, rales, rhonchi or wheezes  CV: regular rate and rhythm, normal color / no pallor or cyanosis visible.  SKIN: no suspicious lesions or rashes  NEURO: Normal strength and tone, mentation intact and speech normal            Signed Electronically by: SHEFALI Lawrence CNP    "

## 2025-06-03 ENCOUNTER — PATIENT OUTREACH (OUTPATIENT)
Dept: CARE COORDINATION | Facility: CLINIC | Age: 65
End: 2025-06-03
Payer: COMMERCIAL

## 2025-06-05 ENCOUNTER — PATIENT OUTREACH (OUTPATIENT)
Dept: CARE COORDINATION | Facility: CLINIC | Age: 65
End: 2025-06-05
Payer: COMMERCIAL

## 2025-07-21 ENCOUNTER — PATIENT OUTREACH (OUTPATIENT)
Dept: CARE COORDINATION | Facility: CLINIC | Age: 65
End: 2025-07-21
Payer: COMMERCIAL

## 2025-08-14 DIAGNOSIS — E66.01 MORBID OBESITY (H): ICD-10-CM

## 2025-08-14 RX ORDER — PHENTERMINE HYDROCHLORIDE 37.5 MG/1
TABLET ORAL
Qty: 90 TABLET | Refills: 1 | Status: SHIPPED | OUTPATIENT
Start: 2025-08-14

## 2025-08-21 ENCOUNTER — ANCILLARY PROCEDURE (OUTPATIENT)
Dept: MAMMOGRAPHY | Facility: CLINIC | Age: 65
End: 2025-08-21
Payer: COMMERCIAL

## 2025-08-21 DIAGNOSIS — Z12.31 VISIT FOR SCREENING MAMMOGRAM: ICD-10-CM

## 2025-08-21 PROCEDURE — 77063 BREAST TOMOSYNTHESIS BI: CPT

## (undated) DEVICE — NEEDLE HYPO 21GA X 1-1/2 SAFETY 305917

## (undated) DEVICE — CAST PADDING 6IN COTTON WEBRIL STERILE 2554

## (undated) DEVICE — CUSTOM PACK TOTAL KNEE SOP5BTKHEC

## (undated) DEVICE — SOL NACL 0.9% INJ 1000ML BAG 2B1324X

## (undated) DEVICE — GOWN IMPERVIOUS BREATHABLE SMART XLG 89045

## (undated) DEVICE — GLOVE BIOGEL PI INDICATOR 8.0 LF 41680

## (undated) DEVICE — KIT PATIENT CARE HANA TABLE PROFX SUPINE 6855

## (undated) DEVICE — GLOVE UNDER INDICATOR PI SZ 8.5 LF 41685

## (undated) DEVICE — GLOVE BIOGEL PI ULTRATOUCH G SZ 8.0 42180

## (undated) DEVICE — BONE CLEANING TIP INTERPULSE  0210-010-000

## (undated) DEVICE — SOL NACL 0.9% IRRIG 1000ML BOTTLE 2F7124

## (undated) DEVICE — SUCTION IRR SYSTEM W/O TIP INTERPULSE HANDPIECE 0210-100-000

## (undated) DEVICE — DECANTER VIAL 2006S

## (undated) DEVICE — HOOD SURG T7PLUS PEEL AWAY FACE SHIELD STRL LF 0416-801-100

## (undated) DEVICE — BLADE SAGITTAL WIDE (SO-618) 2108-118

## (undated) DEVICE — SU PDO 1 STRATAFIX 36X36CM CTX TAPERPOINT SXPD2B405

## (undated) DEVICE — PLATE GROUNDING ADULT W/CORD 9165L

## (undated) DEVICE — SUCTION MANIFOLD NEPTUNE 2 SYS 4 PORT 0702-020-000

## (undated) DEVICE — GLOVE SURG PI ULTRA TOUCH M SZ 8-1/2 LF

## (undated) DEVICE — DRAPE IOBAN 2 C-SECTION 3M 6697

## (undated) DEVICE — CUSTOM PACK TOTAL KNEE ACCESSORY SOP5BTAHEA

## (undated) DEVICE — SUTURE VICRYL+ 2-0 27IN CT-1 UND VCP259H

## (undated) DEVICE — SU FIBERWIRE 2 38" T-8 NDL  AR-7206

## (undated) DEVICE — SUTURE VICRYL+ 1 18 CT/CR  VLT VCP753D

## (undated) DEVICE — SU MONOCRYL 3-0 PS-2 18" UND MCP497G

## (undated) DEVICE — CUSTOM PACK ANTERIOR HIP SOP5BAHHED

## (undated) DEVICE — SUTURE MONOCRYL 3-0 18 PS2 UND MCP497G

## (undated) DEVICE — HOLDER LIMB VELCRO OR 0814-1533

## (undated) DEVICE — BLADE SAW SAGITTAL STRK DUAL CUT 4118-135-090

## (undated) DEVICE — SOL WATER IRRIG 1000ML BOTTLE 2F7114

## (undated) DEVICE — MAT FLOOR SURGICAL 40X38 0702140238

## (undated) DEVICE — A3 SUPPLIES- SEE NURSING INFO PAGE

## (undated) DEVICE — ELECTRODE PATIENT RETURN ADULT L10 FT 2 PLATE CORD 0855C

## (undated) DEVICE — DRSG AQUACEL AG HYDROFIBER  3.5X10" 422605

## (undated) RX ORDER — PROPOFOL 10 MG/ML
INJECTION, EMULSION INTRAVENOUS
Status: DISPENSED
Start: 2024-08-28

## (undated) RX ORDER — DEXAMETHASONE SODIUM PHOSPHATE 10 MG/ML
INJECTION, EMULSION INTRAMUSCULAR; INTRAVENOUS
Status: DISPENSED
Start: 2023-06-28

## (undated) RX ORDER — LIDOCAINE HYDROCHLORIDE 10 MG/ML
INJECTION, SOLUTION EPIDURAL; INFILTRATION; INTRACAUDAL; PERINEURAL
Status: DISPENSED
Start: 2023-06-28

## (undated) RX ORDER — LIDOCAINE HYDROCHLORIDE 10 MG/ML
INJECTION, SOLUTION EPIDURAL; INFILTRATION; INTRACAUDAL; PERINEURAL
Status: DISPENSED
Start: 2024-08-28

## (undated) RX ORDER — ONDANSETRON 2 MG/ML
INJECTION INTRAMUSCULAR; INTRAVENOUS
Status: DISPENSED
Start: 2024-08-28

## (undated) RX ORDER — FENTANYL CITRATE 50 UG/ML
INJECTION, SOLUTION INTRAMUSCULAR; INTRAVENOUS
Status: DISPENSED
Start: 2023-06-28

## (undated) RX ORDER — ONDANSETRON 2 MG/ML
INJECTION INTRAMUSCULAR; INTRAVENOUS
Status: DISPENSED
Start: 2023-06-28